# Patient Record
Sex: FEMALE | Race: WHITE
[De-identification: names, ages, dates, MRNs, and addresses within clinical notes are randomized per-mention and may not be internally consistent; named-entity substitution may affect disease eponyms.]

---

## 2019-01-08 ENCOUNTER — HOSPITAL ENCOUNTER (INPATIENT)
Dept: HOSPITAL 75 - 4TH | Age: 34
LOS: 4 days | Discharge: HOME | End: 2019-01-12
Payer: SELF-PAY

## 2019-02-18 ENCOUNTER — HOSPITAL ENCOUNTER (OUTPATIENT)
Dept: HOSPITAL 75 - SLEEP | Age: 34
LOS: 1 days | Discharge: HOME | End: 2019-02-19
Attending: NURSE PRACTITIONER
Payer: COMMERCIAL

## 2019-02-18 DIAGNOSIS — G47.10: Primary | ICD-10-CM

## 2019-02-18 DIAGNOSIS — Z72.0: ICD-10-CM

## 2019-02-18 DIAGNOSIS — I50.9: ICD-10-CM

## 2019-02-18 DIAGNOSIS — R94.30: ICD-10-CM

## 2019-02-18 PROCEDURE — 95810 POLYSOM 6/> YRS 4/> PARAM: CPT

## 2019-04-23 ENCOUNTER — HOSPITAL ENCOUNTER (OUTPATIENT)
Dept: HOSPITAL 75 - CARD | Age: 34
End: 2019-04-23
Attending: INTERNAL MEDICINE
Payer: COMMERCIAL

## 2019-04-23 DIAGNOSIS — I08.1: ICD-10-CM

## 2019-04-23 DIAGNOSIS — I42.8: Primary | ICD-10-CM

## 2019-04-23 PROCEDURE — 93306 TTE W/DOPPLER COMPLETE: CPT

## 2020-02-16 ENCOUNTER — HOSPITAL ENCOUNTER (EMERGENCY)
Dept: HOSPITAL 75 - ER | Age: 35
Discharge: HOME | End: 2020-02-16
Payer: SELF-PAY

## 2020-02-16 VITALS — WEIGHT: 293 LBS | BODY MASS INDEX: 48.82 KG/M2 | HEIGHT: 64.96 IN

## 2020-02-16 VITALS — SYSTOLIC BLOOD PRESSURE: 136 MMHG | DIASTOLIC BLOOD PRESSURE: 87 MMHG

## 2020-02-16 DIAGNOSIS — K02.9: Primary | ICD-10-CM

## 2020-02-16 DIAGNOSIS — Z88.8: ICD-10-CM

## 2020-02-16 DIAGNOSIS — Z88.4: ICD-10-CM

## 2020-02-16 DIAGNOSIS — K04.7: ICD-10-CM

## 2020-02-16 PROCEDURE — 99282 EMERGENCY DEPT VISIT SF MDM: CPT

## 2020-02-16 NOTE — ED EENT
History of Present Illness


General


Chief Complaint:  Dental Problems/Pain


Stated Complaint:  DENTAL PAIN


Nursing Triage Note:  


Patient ambulatory to ER FT3 with complaint of dental pain x 3 days. Patient 


states she broke an upper left tooth 3 days ago and now has swelling and pain to




the left upper jaw. Patient has been taking orajel and ibuprofen with minimal 


relief.


Source:  patient


Exam Limitations:  no limitations





History of Present Illness


Date Seen by Provider:  Feb 16, 2020


Time Seen by Provider:  13:46


Initial Comments


34-year-old female patient presents with complaints of left upper dental pain 3

days.  Patient reports chipping the tooth 3 days ago.  She denies improvement 

with Orajel and ibuprofen at home.


Timing/Duration:  gradual


Location:  facial, dental


Prearrival Treatment:  over the counter meds


Modifying Factors:  Improves With Other (worse with palpation and chewing.)





Allergies and Home Medications


Allergies


Coded Allergies:  


     paroxetine (Unverified  Allergy, Unknown, 5/5/14)


Uncoded Allergies:  


     ANESTHETIC (Allergy, Unknown, 5/5/14)





Home Medications


Cefdinir 300 Mg Capsule, 300 MG PO BID


   Prescribed by: TIFFANY GILBERT on 1/10/19 1654


Metoprolol Succinate 25 Mg Tab.er.24h, 25 MG PO DAILY


   Prescribed by: TIFFANY GILBERT on 1/10/19 1654


Penicillin V Potassium 500 Mg Tablet, 500 MG PO QID


   Prescribed by: FELICITY TERESA on 2/16/20 1412


Sacubitril/Valsartan 1 Each Tablet, 1 TAB PO BID


   Prescribed by: TIFFANY GILBERT on 1/10/19 1654


Tramadol HCl 50 Mg Tablet, 50 MG PO Q6H PRN for PAIN


   Prescribed by: FELICITY TERESA on 2/16/20 1412





Patient Home Medication List


Home Medication List Reviewed:  Yes





Review of Systems


Review of Systems


Constitutional:  No chills, No diaphoresis, No fever, No malaise


Eyes:  No Symptoms Reported


Ears:  No Symptoms Reported


Nose:  no symptoms reported


Mouth:  see HPI, pain, swelling (left upper); denies bloody discharge, denies 

clear discharge, denies purulent discharge, denies serosanguinous discharge


Throat:  denies pain, denies swelling, denies neck stiffness, denies hoarse, 

denies aphonia, denies muffled, denies painful swallowing, denies difficulty 

with fluids


Respiratory:  no symptoms reported


Cardiovascular:  no symptoms reported


Gastrointestinal:  no symptoms reported


Skin:  no symptoms reported


Neurological:  No Symptoms Reported





All Other Systems Reviewed


Negative Unless Noted:  Yes (Negative excepted noted.)





Past Medical-Social-Family Hx


Past Med/Social Hx:  Reviewed Nursing Past Med/Soc Hx


Patient Social History


Type Used:  Cigarettes


2nd Hand Smoke Exposure:  Yes


Recent Foreign Travel:  No


Contact w/Someone Who Travel:  No


Recent Infectious Disease Expo:  No


Recent Hopitalizations:  No





Immunizations Up To Date


Date of Pneumonia Vaccine:  Oct 1, 2011


Date of Influenza Vaccine:  Jan 9, 2019





Seasonal Allergies


Seasonal Allergies:  No





Past Medical History


Surgeries:  Yes


Adenoidectomy, Tonsillectomy, Tubal Ligation


Respiratory:  Yes (VERY DIFFICULT TO INTUBATE)


Asthma


Cardiac:  Yes


Neurological:  No


Reproductive Disorders:  Yes


GYN History:  Tubal Ligation


Genitourinary:  No


Gastrointestinal:  No


Musculoskeletal:  No


Endocrine:  No


HEENT:  No


Cancer:  No


Psychosocial:  No


Integumentary:  No


Blood Disorders:  No





Family Medical History


Reviewed Nursing Family Hx





Hypertension


  19 FATHER


  19 MOTHER


Myocardial infarction


  Maternal Grandmother


  Maternal Grandfather


Heart Disease, Other Conditions/Hx





Physical Exam


Vital Signs





Vital Signs - First Documented








 2/16/20





 13:26


 


Temp 37.1


 


Pulse 76


 


Resp 18


 


B/P (MAP) 136/87 (103)


 


Pulse Ox 100


 


O2 Delivery Room Air








Height, Weight, BMI


Height: 5'5.00"


Weight: 251lbs. 6.0oz. [id number]; 49.00 BMI


Method:Estimated


General Appearance:  WD/WN, no apparent distress, obese


Eyes:  bilateral eye normal inspection, bilateral eye PERRL, bilateral eye EOMI


Ears:  bilateral ear auricle normal, bilateral ear canal normal, bilateral ear 

TM normal


Nose:  normal inspection


Mouth/Throat:  pharynx normal, dental tenderness (left upper dental tenderness);

No excessive drooling; maxillary swelling (left); No pharynx swelling, No 

trismus, No uvula swelling, No voice changes


Neck:  non-tender, full range of motion, supple, normal inspection


Cardiovascular:  normal peripheral pulses, regular rate, rhythm, no edema, no 

murmur


Respiratory:  lungs clear, normal breath sounds, no respiratory distress, no 

accessory muscle use


Neurologic/Psychiatric:  alert, normal mood/affect, oriented x 3


Skin:  normal color, warm/dry





Progress/Results/Core Measures


Results/Orders


My Orders





Orders - FELICITY TEREAS


Lidocaine 2% Viscous 15 Ml (Xylocaine Vi (2/16/20 14:15)


Benzocaine Extension Tube (Hurricaine Ex (2/16/20 14:15)





Vital Signs/I&O











 2/16/20





 13:26


 


Temp 37.1


 


Pulse 76


 


Resp 18


 


B/P (MAP) 136/87 (103)


 


Pulse Ox 100


 


O2 Delivery Room Air














Blood Pressure Mean:                    103











Departure


Impression





   Primary Impression:  


   Infected dental caries


Disposition:  01 HOME, SELF-CARE


Condition:  Improved





Departure-Patient Inst.


Decision time for Depature:  14:10


Referrals:  


[facility] (PCP/Family)


Primary Care Physician


Patient Instructions:  Dental Pain, Tooth Abscess (DC)





Add. Discharge Instructions:  


All discharge instructions reviewed with patient and/or family. Voiced 

understanding.  Medications as instructed.  Tylenol Extra Strength 

over-the-counter as directed for pain.  Ibuprofen 800 mg by mouth every 8 hours 

as needed for pain.  Soft diet as tolerated.  Stay well hydrated.  Ice packs or 

warm packs as needed for pain.  Follow-up with the dentist of your choice for 

recheck and dental repair as an outpatient.  Call their office Monday morning 

for appointment time.  Return to the emergency department for worsened pain, 

fever, difficulty swallowing, difficulty breathing, or any other concerns.





Lidocaine with benzocaine gauze pads- Place 1 pad between the gums and cheek of 

the affected tooth for 5-10 minutes.  Then remove the pad.  You may repeat this 

4 times a day as needed for pain.  DO NOT lie down with the pad in her mouth, 

swallow the pad, or fall asleep with the pad in your mouth due to risk of 

choking, bowel obstruction, and death.


Scripts


Tramadol HCl (Tramadol HCl) 50 Mg Tablet


50 MG PO Q6H PRN for PAIN, #14 TAB 0 Refills


   Prov: FELICITY TERESA         2/16/20 


Penicillin V Potassium (Penicillin V Potassium) 500 Mg Tablet


500 MG PO QID, #42 TAB 0 Refills


   Prov: FELICITY TERESA         2/16/20











FELICITY TERESA           Feb 16, 2020 13:46

## 2020-08-18 ENCOUNTER — HOSPITAL ENCOUNTER (EMERGENCY)
Dept: HOSPITAL 75 - ER | Age: 35
Discharge: HOME | End: 2020-08-18
Payer: SELF-PAY

## 2020-08-18 VITALS — HEIGHT: 65 IN | WEIGHT: 274.92 LBS | BODY MASS INDEX: 45.8 KG/M2

## 2020-08-18 VITALS — SYSTOLIC BLOOD PRESSURE: 127 MMHG | DIASTOLIC BLOOD PRESSURE: 88 MMHG

## 2020-08-18 DIAGNOSIS — F17.210: ICD-10-CM

## 2020-08-18 DIAGNOSIS — Z82.49: ICD-10-CM

## 2020-08-18 DIAGNOSIS — K04.7: Primary | ICD-10-CM

## 2020-08-18 DIAGNOSIS — Z88.8: ICD-10-CM

## 2020-08-18 DIAGNOSIS — Z88.4: ICD-10-CM

## 2020-08-18 PROCEDURE — 99283 EMERGENCY DEPT VISIT LOW MDM: CPT

## 2020-08-18 NOTE — XMS REPORT
[facility]

                             Created on: 2020



Love Chery

External Reference #: [id number]

: 1985

Sex: Female



Demographics





                          Address                   [address] [place]  [address]

 

                          Preferred Language        Unknown

 

                          Marital Status            Unknown

 

                          Mosque Affiliation     Unknown

 

                          Race                      Unknown

 

                          Ethnic Group              Unknown





Author





                          Author                    Love SHETH

 

                          [facility]

 

                          Address                   [address] [place]  [address]



 

                          Phone                     [phone]







Care Team Providers





                    Care Team Member Name Role                Phone

 

                    MERYL  JACKIE        Unavailable         [phone]







PROBLEMS





          Type      Condition ICD9-CM Code SLS40-YD Code Onset Dates Condition S

tatus SNOMED 

Code

 

          Problem   Seasonal allergic rhinitis due to pollen           J30.1    

           Active    26941677

 

                          Problem                   Heart failure, unspecified H

F chronicity, unspecified heart failure type

                          I50.9                     Active       43109021

 

          Problem   Asthma              J45.909             Active    [id number]







ALLERGIES

No Information



ENCOUNTERS





                Encounter       Location        Date            Diagnosis

 

                          [facility]  [id number]

[address] [place] [address] 

[address]                              Acute left-sided low back pa

in without sciatica M54.5

 

                          [facility]  [address] [facility] [id number]

[address] [place] [address] 

[address]                05 Mar, 2020              Sore throat J02.9

 

                          [facility]     [address] [facility] [id number]

[address] [place] [address]

                          09 Sep, 2019               

 

                          [facility]     [address] [id number]

[address] [place] [address]

                                         

 

                          [facility]     [address] [facility] [id number]

[address] [place] [address]

                          17 May, 2019               

 

                          [facility]     [address] [id number]

[address] [place] [address]

                          14 May, 2019               

 

                          [facility]     [address] [id number]

[address] [place] [address]

                          28 Mar, 2019               

 

                          [facility]     [address] [id number]

[address] [place] [address]

                          06 Mar, 2019              Morbid obesity E66.01 and Lo

calized edema R60.0

 

                          [facility]     [address] [id number]

[address] [place] [address]

                          14 2019               

 

                          [facility]     [address] [address]

                          10 Charles, 2019               

 

                          [facility]     [address] [address]

                          10 Charles, 2019              Heart failure, unspecified H

F chronicity, unspecified heart failure

type I50.9

 

                          [facility]  [address] 

[address]                              Seasonal allergic rhinitis d

ue to pollen J30.1 and 

Asthma J45.909

 

                          [facility]  [address] 

[address]                              Seasonal allergic rhinitis d

ue to pollen J30.1

 

                          [facility] [place] [address] 

[address]                19 Oct, 2016              Acute upper respiratory infe

ction, unspecified J06.9

 

                          [place] [address] [address]

                          05 Oct, 2016               

 

                          [facility]  [address] 

[address]                13 May, 2016              Environmental allergies Z91.

09

 

                          [place] [address] [address]

                          24 Aug, 2015              Abscess 682.9

 

                          [address] [place] [address] [address]

                          17 Aug, 2015              Mood disorder 296.90

 

                          [place] [address] [address]

                                        Screen for STD (sexually tra

nsmitted disease) V74.5

 

                          [place] [id number]

[address] [place] [address]

                          15 Jul, 2015               

 

                          [place] [address] [address]

                          13 2015              Depression 311

 

                          [facility]     [address] [id number]

[address] [place] [address]

                          13 2015              Major depressive disorder, r

ecurrent episode, severe 296.33 and No 

condition on Axis II V71.09

 

                          [facility]     [address] [id number]

[address] [place] [address]

                          10 2015               

 

                          [facility]     [address] [id number]

[address] [place] [address]

                          14 2015               

 

                          [facility]     [address] [id number]

[address] [place] [address]

                                         

 

                          [facility]     [address] [id number]

[address] [place] [address]

                          16 Sep, 2014               

 

                          [facility]     [address] [id number]

[address] [place] [address]

                          16 Sep, 2014               

 

                          [facility]     [address] [id number]

[address] [place] [address]

                          15 Sep, 2014               

 

                          [facility]     [address] [id number]

[address] [place] [address]

                          15 Sep, 2014               

 

                          [facility]     [address] [id number]

[address] [place] [address]

                          12 Sep, 2014               

 

                          [facility]     [address] [id number]

[address] [place] [address]

                          12 Sep, 2014               

 

                          [facility]     [address] [id number]

[address] [place] [address]

                          06 Aug, 2014               

 

                          [facility]     [address] [id number]

[address] [place] [address]

                          06 Aug, 2014               

 

                          [facility]     [address] [id number]

[address] [place] [address]

                                         

 

                          [facility]     [address] [id number]

[address] [place] [address]

                                         

 

                          [facility]     [address] [id number]

[address] [place] [address], [place] [address]

                          10 Adam, 2014               

 

                          [facility]     [address] [id number]

[address] [place] [address], [place] [address]

                          10 Adam, 2014               

 

                          [facility]     [address] [id number]

[address] [place] [address], [place] [address]

                          07 May, 2014               

 

                          [facility]     [address] [id number]

[address] [place] [address], [place] [address]

                          07 May, 2014               

 

                          [facility]     [address] [id number]

[address] [place] [address], [place] [address]

                          05 May, 2014               

 

                          [facility]     [address] [id number]

[address] [place] [address], [place] [address]

                          05 May, 2014               

 

                          [facility]     [address] [id number]

[address] [place] [address], [place] [address]

                                         

 

                          [facility]     [address] [id number]

[address] [place] [address], [place] [address]

                                         

 

                          [facility]     [address] [id number]

[address] [place] [address], [place] [address]

                          25 Mar, 2014               

 

                          [facility]     [address] [id number]

[address] [place] [address], [place] [address]

                          24 Mar, 2014               

 

                          [facility]     [address] [id number]

[address] [place] [address], [place] [address]

                          24 Mar, 2014               

 

                          [facility]     [address] [id number]

[address] [place] [address], [place] [address]

                          13 Mar, 2014               

 

                          [facility]     [address] [id number]

[address] [place] [address], [place] [address]

                          13 Mar, 2014               

 

                          [facility]     [address] [id number]

[address] [place] [address], [place] [address]

                                         

 

                          [facility]     [address] [id number]

[address] [place] [address], [place] [address]

                                         

 

                          [facility]     [address] [id number]

[address] [place] [address], [place] [address]

                                         

 

                          [facility]     [address] [id number]

[address] [place] [address], [place] [address]

                                         

 

                          [facility]     [address] [id number]

[address] [place] [address], [place] [address]

                                         

 

                          [facility]     [address] [id number]

[address] [place] [address], [place] [address]

                                         

 

                          [facility]     [address] [id number]

[address] [place] [address], [place] [address]

                                         

 

                          [facility]     [address] [id number]

[address] [place] [address], [place] [address]

                          19 Dec, 2013               

 

                          [facility]     [address] [id number]

[address] [place] [address], [place] [address]

                          19 Dec, 2013               

 

                          [facility]     [address] [id number]

[address] [place] [address], [place] [address]

                                         

 

                          [facility]     [address] [id number]

[address] [place] [address], [place] [address]

                                         

 

                          [facility]     [address] [id number]

[address] [place] [address], [place] [address]

                                         

 

                          [facility]     [address] [id number]

[address] [place] [address], [place] [address]

                                         

 

                          [facility]     [address] [id number]

[address] [place] [address], [place] [address]

                          27 Sep, 2013               

 

                          [facility]     [address] [id number]

[address] [place] [address], [place] [address]

                          05 Sep, 2013               

 

                          [facility]     [address] [id number]

[address] [place] [address], [place] [address]

                                         

 

                          [facility]     [address] [id number]

[address] [place] [address], [place] [address]

                                         

 

                          [facility]     [address] [id number]

[address] [place] [address], [place] [address]

                                         

 

                          [facility]     [address] [id number]

[address] [place] [address], [place] [address]

                                         

 

                          [facility]     [address] [id number]

[address] [address]

                          31 May, 2013               

 

                          [facility]     [address] [id number]

[address] [place] [address], [place] [address]

                          06 May, 2013               

 

                          [facility]     [address] [id number]

[address] [place] [address], [place] [address]

                                         

 

                          [facility]     [address] [id number]

[address] [place] [address], [place] [address]

                          28 Mar, 2013               

 

                          [facility]     [address] [id number]

[address] [place] [address], [place] [address]

                          18 Mar, 2013               

 

                          [facility]     [address] [id number]

[address] [place] [address], [place] [address]

                          14 Mar, 2013               

 

                          [facility]     [address] [id number]

[address] [place] [address], [place] [address]

                          13 Mar, 2013               

 

                          [facility]     [address] [id number]

[address] [place] [address], [place] [address]

                          12 Mar, 2013               

 

                          [facility]     [address] [id number]

[address] [place] [address], [place] [address]

                          11 Mar, 2013               

 

                          [facility]     [address] [id number]

[address] [place] [address], [place] [address]

                          06 Mar, 2013               

 

                          [facility]     [address] [id number]

[address] [place] [address], [place] [address]

                          06 Mar, 2013               

 

                          [facility]     [address] [id number]

[address] [place] [address], [place] [address]

                          06 Mar, 2013               

 

                          [facility]     [address] [id number]

[address] [place] [address], [place] [address]

                          06 Mar, 2013               

 

                          [facility]     [address] [id number]

[address] [place] [address], [place] [address]

                          05 Mar, 2013               

 

                          [facility]     [address] [id number]

[address] [place] [address], [place] [address]

                          06 2013               

 

                          [facility]     [address] [id number]

[address] [place] [address], [place] [address]

                                         

 

                          [facility]     [address] [id number]

[address] [place] [address], [place] [address]

                                         

 

                          [facility]     [address] [id number]

[address] [place] [address], [place] [address]

                                         

 

                          [facility]     [address] [id number]

[address] [place] [address], [place] [address]

                                         

 

                    [facility]     [address] [id number] [place], 

[place] [id number] 20 Aug, 2012

                                         

 

                          [facility]     [address] [id number]

[address] [place] [address], [place] [address]

                                         

 

                          [facility]     [address] [id number]

[address] [place] [address], [place] [address]

                                         

 

                          [facility]     [address] [id number]

[address] [place] [address], [place] [address]

                                         

 

                          [facility]     [address] [id number]

[address] [place] [address], [place] [address]

                                         

 

                          [facility]     [address] [id number]

[address] [place] [address], [place] [address]

                                         

 

                          [facility]     [address] [id number]

[address] [place] [address], [place] [address]

                                         

 

                          [facility]     [address] [id number]

[address] [place] [address], [place] [address]

                                         

 

                          [facility]     [address] [id number]

[address] [place] [address], [place] [address]

                                         

 

                          [facility]     [address] [id number]

[address] [place] [address], [place] [address]

                          26 Mar, 2012               

 

                          [facility]     [address] [id number]

[address] [place] [address], [place] [address]

                                         

 

                          [facility]     [address] [id number]

[address] [place] [address], [place] [address]

                                         

 

                          [facility]     [address] [id number]

[address] [place] [address], [place] [address]

                          12 Dec, 2011               

 

                          [facility]     [address] [id number]

[address] [place] [address], [place] [address]

                                         

 

                          [facility]     [address] [id number]

[address] [place] [address], [place] [address]

                                         

 

                          [facility]     [address] [id number]

[address] [place] [address], [place] [address]

                                         

 

                          [facility]     [address] [id number]

[address] [place] [address], [place] [address]

                                         

 

                          [facility]     [address] [id number]

[address] [place] [address], [place] [address]

                          31 Oct, 2011               

 

                          [facility]     [address] [id number]

[address] [place] [address], [place] [address]

                          17 Oct, 2011               

 

                          [facility]     [address] [id number]

[address] [place] [address], [place] [address]

                          17 Oct, 2011               

 

                          [facility]     [address] [id number]

[address] [place] [address], [place] [address]

                          10 Oct, 2011               

 

                          [facility]     [address] [id number]

[address] [place] [address], [place] [address]

                          22 Dec, 2010               

 

                          [facility]     [address] [id number]

[address] [place] [address], [place] [address]

                                         

 

                          [facility]     [address] [id number]

[address] [place] [address], [place] [address]

                                         

 

                          [facility]     [address] [id number]

[address] [place] [address], [place] [address]

                          19 May, 2010               

 

                          [facility]     [address] [id number]

[address] [place] [address], [place] [address]

                          17 May, 2010               

 

                          [facility]     [address] [id number]

[address] [place] [address], [place] [address]

                          13 May, 2010               

 

                          [facility]     [address] [id number]

[address] [place] [phone]

                          11 May, 2010               

 

                          [facility]     [address] [facility] [id number]

[address] [place] [phone]

                          10 May, 2010               

 

                          [facility]     [address] [facility] [id number]

[address] [place] [phone]

                          11 Dec, 2009               

 

                          [facility]     [address] [facility] [id number]

[address] [place] [phone]

                          11 Dec, 2009               

 

                          [facility]     [address] [facility] [id number]

[address] [place] [phone]

                                         

 

                          [facility]     [address] [facility] [id number]

[address] [place] [phone]

                                         







IMMUNIZATIONS

No Known Immunizations



SOCIAL HISTORY

Never Assessed



REASON FOR VISIT





PLAN OF CARE





VITAL SIGNS





MEDICATIONS

No Known Medications



RESULTS

No Results



PROCEDURES

No Known procedures



INSTRUCTIONS





MEDICATIONS ADMINISTERED

No Known Medications



MEDICAL (GENERAL) HISTORY





                    Type                Description         Date

 

                    Medical History     asthma-dx at age 11-12  

 

                          Medical History           hypertension-hx of pre-eclam

psia with second pregnancy, was 

induced at 35 weeks                      

 

                    Medical History     cardiomyopathy-postpartum (non-ischemic)

 (Stephanie)  

 

                    Medical History     depression           

 

                    Medical History     [facility]                  

 

                    Surgical History    tonsillectomy        

 

                    Surgical History    tubal               2014

 

                    Surgical History    tonsillectomy        

 

                          Surgical History          tubal afivkubd-Rrceyp-is dev

eloped respiratory issues and heart

failure following the surgery           2011

 

                    Surgical History    echo-2010, 8/19/10, , 11  

 

                          Hospitalization History   PPD #4 for non-ischemic card

iomyopathy, respiratory 

distress due to pulmonary edema, ARF    2010

 

                    Hospitalization History surgeries

## 2020-08-18 NOTE — XMS REPORT
[facility]

                             Created on: 2020



Love Chery

External Reference #: [id number]

: 1985

Sex: Female



Demographics





                          Address                   [address] [place]  [address]

 

                          Preferred Language        Unknown

 

                          Marital Status            Unknown

 

                          Buddhism Affiliation     Unknown

 

                          Race                      Unknown

 

                          Ethnic Group              Unknown





Author





                          Author                    Love SHETH

 

                          [facility] [address]

 

                          Address                   [address] [place]  [address]



 

                          Phone                     [phone]







Care Team Providers





                    Care Team Member Name Role                Phone

 

                    MERYL  JACKIE        Unavailable         [phone]







PROBLEMS





          Type      Condition ICD9-CM Code AYJ88-UM Code Onset Dates Condition S

tatus SNOMED 

Code

 

          Problem   Seasonal allergic rhinitis due to pollen           J30.1    

           Active    84460179

 

                          Problem                   Heart failure, unspecified H

F chronicity, unspecified heart failure type

                          I50.9                     Active       86492984

 

          Problem   Asthma              J45.909             Active    920930786







ALLERGIES

No Information



ENCOUNTERS





                Encounter       Location        Date            Diagnosis

 

                          [facility]  [address] [id number]

[address] [place] [address] 

[address]                              Acute left-sided low back pa

in without sciatica M54.5

 

                          [facility]  [address] [facility] [id number]

[address] [place] [address] 

[address]                05 Mar, 2020              Sore throat J02.9

 

                          [facility]     [address] [facility] [id number]

[address] [place] [address]

                          09 Sep, 2019               

 

                          [facility]     [address] [id number]

[address] [place] [address]

                                         

 

                          [facility]     [address] [facility] [id number]

[address] [place] [address]

                          17 May, 2019               

 

                          [facility]     [address] [id number]

[address] [place] [address]

                          14 May, 2019               

 

                          [facility]     [address] [id number]

[address] [place] [address]

                          28 Mar, 2019               

 

                          [facility]     [address] [id number]

[address] [place] [address]

                          06 Mar, 2019              Morbid obesity E66.01 and Lo

calized edema R60.0

 

                          [facility]     [address] [id number]

[address] [place] [address]

                          14 2019               

 

                          [facility]     [address] [address]

                          10 Charles, 2019               

 

                          [facility]     [address] [address]

                          10 Charles, 2019              Heart failure, unspecified H

F chronicity, unspecified heart failure

type I50.9

 

                          [facility]  [address] 

[address]                              Seasonal allergic rhinitis d

ue to pollen J30.1 and 

Asthma J45.909

 

                          [facility]  [address] 

[address]                              Seasonal allergic rhinitis d

ue to pollen J30.1

 

                          [facility] WALK IN [address] [address] 

[address]                19 Oct, 2016              Acute upper respiratory infe

ction, unspecified J06.9

 

                          [place] [address] [address]

                          05 Oct, 2016               

 

                          [facility]  [address] 

[address]                13 May, 2016              Environmental allergies Z91.

09

 

                          [place] [address] [address]

                          24 Aug, 2015              Abscess 682.9

 

                          [address] [address] [address]

                          17 Aug, 2015              Mood disorder 296.90

 

                          [address] [place] [address] [address]

                                        Screen for STD (sexually tra

nsmitted disease) V74.5

 

                          [place] [address] [id number]

[address] [place] [address]

                          15 Jul, 2015               

 

                          [address] [place] [address] [address]

                          13 2015              Depression 311

 

                          [facility]     [address] [id number]

[address] [place] [address]

                          13 2015              Major depressive disorder, r

ecurrent episode, severe 296.33 and No 

condition on Axis II V71.09

 

                          [facility]     [address] [id number]

[address] [place] [address]

                          10 2015               

 

                          [facility]     [address] [id number]

[address] [place] [address]

                          14 2015               

 

                          [facility]     [address] [id number]

[address] [place] [address]

                                         

 

                          [facility]     [address] [id number]

[address] [place] [address]

                          16 Sep, 2014               

 

                          [facility]     [address] [id number]

[address] [place] [address]

                          16 Sep, 2014               

 

                          [facility]     [address] [id number]

[address] [place] [address]

                          15 Sep, 2014               

 

                          [facility]     [address] [id number]

[address] [place] [address]

                          15 Sep, 2014               

 

                          [facility]     [address] [id number]

[address] [place] [address]

                          12 Sep, 2014               

 

                          [facility]     [address] [id number]

[address] [place] [address]

                          12 Sep, 2014               

 

                          [facility]     [address] [id number]

[address] [place] [address]

                          06 Aug, 2014               

 

                          [facility]     [address] [id number]

[address] [place] [address]

                          06 Aug, 2014               

 

                          [facility]     [address] [id number]

[address] [place] [address]

                                         

 

                          [facility]     [address] [id number]

[address] [place] [address]

                                         

 

                          [facility]     [address] [id number]

[address] [place] [address], [place] [address]

                          10 Adam, 2014               

 

                          [facility]     [address] [id number]

[address] [place] [address], [place] [address]

                          10 Adam, 2014               

 

                          [facility]     [address] [id number]

[address] [place] [address], [place] [address]

                          07 May, 2014               

 

                          [facility]     [address] [id number]

[address] [place] [address], [place] [address]

                          07 May, 2014               

 

                          [facility]     [address] [id number]

[address] [place] [address], [place] [address]

                          05 May, 2014               

 

                          [facility]     [address] [id number]

[address] [place] [address], [place] [address]

                          05 May, 2014               

 

                          [facility]     [address] [id number]

[address] [place] [address], [place] [address]

                                         

 

                          [facility]     [address] [id number]

[address] [place] [address], [place] [address]

                                         

 

                          [facility]     [address] [id number]

[address] [place] [address], [place] [address]

                          25 Mar, 2014               

 

                          [facility]     [address] [id number]

[address] [place] [address], [place] [address]

                          24 Mar, 2014               

 

                          [facility]     [address] [id number]

[address] [place] [address], [place] [address]

                          24 Mar, 2014               

 

                          [facility]     [address] [id number]

[address] [place] [address], [place] [address]

                          13 Mar, 2014               

 

                          [facility]     [address] [id number]

[address] [place] [address], [place] [address]

                          13 Mar, 2014               

 

                          [facility]     [address] [id number]

[address] [place] [address], [place] [address]

                                         

 

                          [facility]     [address] [id number]

[address] [place] [address], [place] [address]

                                         

 

                          [facility]     [address] [id number]

[address] [place] [address], [place] [address]

                                         

 

                          [facility]     [address] [id number]

[address] [place] [address], [place] [address]

                                         

 

                          [facility]     [address] [id number]

[address] [place] [address], [place] [address]

                                         

 

                          [facility]     [address] [id number]

[address] [place] [address], [place] [address]

                                         

 

                          [facility]     [address] [id number]

[address] [place] [address], [place] [address]

                                         

 

                          [facility]     [address] [id number]

[address] [place] [address], [place] [address]

                          19 Dec, 2013               

 

                          [facility]     [address] [id number]

[address] [place] [address], [place] [address]

                          19 Dec, 2013               

 

                          [facility]     [address] [id number]

[address] [place] [address], [place] [address]

                                         

 

                          [facility]     [address] [id number]

[address] [place] [address], [place] [address]

                                         

 

                          [facility]     [address] [id number]

[address] [place] [address], [place] [address]

                                         

 

                          [facility]     [address] [id number]

[address] [place] [address], [place] [address]

                                         

 

                          [facility]     [address] [id number]

[address] [place] [address], [place] [address]

                          27 Sep, 2013               

 

                          [facility]     [address] [id number]

[address] [place] [address], [place] [address]

                          05 Sep, 2013               

 

                          [facility]     [address] [id number]

[address] [place] [address], [place] [address]

                                         

 

                          [facility]     [address] [id number]

[address] [place] [address], [place] [address]

                                         

 

                          [facility]     [address] [id number]

[address] [place] [address], [place] [address]

                                         

 

                          [facility]     [address] [id number]

[address] [place] [address], [place] [address]

                                         

 

                          [facility]     [address] [id number]

[facility] [address]

                          31 May, 2013               

 

                          [facility]     [address] [id number]

[address] [place] [address], [place] [address]

                          06 May, 2013               

 

                          [facility]     [address] [id number]

[address] [place] [address], [place] [address]

                                         

 

                          [facility]     [address] [id number]

[address] [place] [address], [place] [address]

                          28 Mar, 2013               

 

                          [facility]     [address] [id number]

[address] [place] [address], [place] [address]

                          18 Mar, 2013               

 

                          [facility]     [address] [id number]

[address] [place] [address], [place] [address]

                          14 Mar, 2013               

 

                          [facility]     [address] [id number]

[address] [place] [address], [place] [address]

                          13 Mar, 2013               

 

                          [facility]     [address] [id number]

[address] [place] [address], [place] [address]

                          12 Mar, 2013               

 

                          [facility]     [address] [id number]

[address] [place] [address], [place] [address]

                          11 Mar, 2013               

 

                          [facility]     [address] [id number]

[address] [place] [address], [place] [address]

                          06 Mar, 2013               

 

                          [facility]     [address] [id number]

[address] [place] [address], [place] [address]

                          06 Mar, 2013               

 

                          [facility]     [address] [id number]

[address] [place] [address], [place] [address]

                          06 Mar, 2013               

 

                          [facility]     [address] [id number]

[address] [place] [address], [place] [address]

                          06 Mar, 2013               

 

                          [facility]     [address] [id number]

[address] [place] [address], [place] [address]

                          05 Mar, 2013               

 

                          [facility]     [address] [id number]

[address] [place] [address], [place] [address]

                          06 2013               

 

                          [facility]     [address] [id number]

[address] [place] [address], [place] [address]

                                         

 

                          [facility]     [address] [id number]

[address] [place] [address], [place] [address]

                                         

 

                          [facility]     [address] [id number]

[address] [place] [address], [place] [address]

                                         

 

                          [facility]     [address] [id number]

[address] [place] [address], [place] [address]

                                         

 

                    [facility]     [address] [id number] [place], 

[place] [id number] 20 Aug, 2012

                                         

 

                          [facility]     [address] [id number]

[address] [place] [address], [place] [address]

                                         

 

                          [facility]     [address] [id number]

[address] [place] [address], [place] [address]

                                         

 

                          [facility]     [address] [id number]

[address] [place] [address], [place] [address]

                                         

 

                          [facility]     [address] [id number]

[address] [place] [address], [place] [address]

                                         

 

                          [facility]     [address] [id number]

[address] [place] [address], [place] [address]

                                         

 

                          [facility]     [address] [id number]

[address] [place] [address], [place] [address]

                                         

 

                          [facility]     [address] [id number]

[address] [place] [address], [place] [address]

                                         

 

                          [facility]     [address] [id number]

[address] [place] [address], [place] [address]

                                         

 

                          [facility]     [address] [id number]

[address] [place] [address], [place] [address]

                          26 Mar, 2012               

 

                          [facility]     [address] [id number]

[address] [place] [address], [place] [address]

                                         

 

                          [facility]     [address] [id number]

[address] [place] [address], [place] [address]

                                         

 

                          [facility]     [address] [id number]

[address] [place] [address], [place] [address]

                          12 Dec, 2011               

 

                          [facility]     [address] [id number]

[address] [place] [address], [place] [address]

                                         

 

                          [facility]     [address] [id number]

[address] [place] [address], [place] [address]

                                         

 

                          [facility]     [address] [id number]

[address] [place] [address], [place] [address]

                                         

 

                          [facility]     [address] [id number]

[address] [place] [address], [place] [address]

                                         

 

                          [facility]     [address] [id number]

[address] [place] [address], [place] [address]

                          31 Oct, 2011               

 

                          [facility]     [address] [id number]

[address] [place] [address], [place] [address]

                          17 Oct, 2011               

 

                          [facility]     [address] [id number]

[address] [place] [address], [place] [address]

                          17 Oct, 2011               

 

                          [facility]     [address] [id number]

[address] [place] [address], [place] [address]

                          10 Oct, 2011               

 

                          [facility]     [address] [id number]

[address] [place] [address], [place] [address]

                          22 Dec, 2010               

 

                          [facility]     [address] [id number]

[address] [place] [address], [place] [address]

                                         

 

                          [facility]     [address] [id number]

[address] [place] [address], [place] [address]

                                         

 

                          [facility]     [address] [id number]

[address] [place] [address], [place] [address]

                          19 May, 2010               

 

                          [facility]     [address] [id number]

[address] [place] [address], [place] [address]

                          17 May, 2010               

 

                          [facility]     [address] [id number]

[address] [place] [address], [place] [address]

                          13 May, 2010               

 

                          [facility]     [address] [id number]

[address] [place] [address]

                          11 May, 2010               

 

                          [facility]     [address] [facility] [id number]

[address] [place] [address]

                          10 May, 2010               

 

                          [facility]     [address] [facility] [id number]

[address] [place] [address]

                          [address] Dec, 2009               

 

                          [facility]     [address] [facility] [id number]

[address] [place] [id number]

                          11 Dec, 2009               

 

                          [facility]     [address] [facility] [id number]

[address] [place] [id number]

                                         

 

                          [facility]     [address] [facility] [id number]

[address] [place] [id number]

                                         







IMMUNIZATIONS

No Known Immunizations



SOCIAL HISTORY

Never Assessed



REASON FOR VISIT





PLAN OF CARE





VITAL SIGNS





                    Height              65.5 in             2012

 

                    Weight              271 lbs             2012

 

                    Temperature         96.6 degrees Fahrenheit 2012

 

                    Heart Rate          80 bpm              2012

 

                    Respiratory Rate    18                  2012

 

                    Blood pressure systolic 128 mmHg            2012

 

                    Blood pressure diastolic 100 mmHg            2012







MEDICATIONS

No Known Medications



RESULTS

No Results



PROCEDURES





                Procedure       Date Ordered    Result          Body Site

 

                COMPLETE CBC W/AUTO DIFF WBC 2012                    

 

                ASSAY THYROID STIM HORMONE 2012                    

 

                NATRIURETIC PEPTIDE 2012                    

 

                LIPID PANEL     2012                    

 

                COMPREHEN METABOLIC PANEL 2012                    

 

                ASSAY OF VITAMIN D 2012                    

 

                VENIPUNCT, ROUTINE* 2012                    







INSTRUCTIONS





MEDICATIONS ADMINISTERED

No Known Medications



MEDICAL (GENERAL) HISTORY





                    Type                Description         Date

 

                    Medical History     asthma-dx at age 11-12  

 

                          Medical History           hypertension-hx of pre-eclam

psia with second pregnancy, was 

induced at 35 weeks                      

 

                    Medical History     cardiomyopathy-postpartum (non-ischemic)

 (Stephanie)  

 

                    Medical History     depression           

 

                    Medical History     [facility]                  

 

                    Surgical History    tonsillectomy        

 

                    Surgical History    tubal               2014

 

                    Surgical History    tonsillectomy        

 

                          Surgical History          tubal [id number] dev

eloped respiratory issues and heart

failure following the surgery           2011

 

                    Surgical History    echo-2010, 8/19/10, , 11  

 

                          Hospitalization History   PPD #4 for non-ischemic card

iomyopathy, respiratory 

distress due to pulmonary edema, ARF    2010

 

                    Hospitalization History surgeries

## 2020-08-18 NOTE — XMS REPORT
[facility]

                             Created on: 2020



Love Chery

External Reference #: [id number]

: 1985

Sex: Female



Demographics





                          Address                   [address] [place]  [address]

 

                          Preferred Language        Unknown

 

                          Marital Status            Unknown

 

                          Mandaen Affiliation     Unknown

 

                          Race                      Unknown

 

                          Ethnic Group              Unknown





Author





                          Author                    Love Short

 

                          Organization              [facility]

 

                          Address                   [address] [place]  [address]



 

                          Phone                     [phone]







Care Team Providers





                    Care Team Member Name Role                Phone

 

                    RANGEL Short    Unavailable         [phone]







PROBLEMS





          Type      Condition ICD9-CM Code IRL33-NJ Code Onset Dates Condition S

tatus SNOMED 

Code

 

          Problem   Nondependent tobacco use disorder 305.1                     

    Active    508068683

 

          Problem   Seasonal allergic rhinitis due to pollen           J30.1    

           Active    71645826

 

                          Problem                   Heart failure, unspecified H

F chronicity, unspecified heart failure type

                          I50.9                     Active       14323913

 

          Problem   Obesity, unspecified 278.00                        Active   

 858213959

 

          Problem   Other and unspecified hyperlipidemia 272.4                  

       Active    92532635

 

          Problem   Asthma              J45.909             Active    521606397

 

          Problem   Major depressive disorder, recurrent episode, severe 296.33 

                       Active    

[id number]







ALLERGIES

No Information



ENCOUNTERS





                Encounter       Location        Date            Diagnosis

 

                    [place] [address] [address] 09 

Sep, 2019                                

 

                    [place] [address] [address]                                 

 

                    [place] [address] [address] 17 

May, 2019                                

 

                    [place] [address] [address] 14 

May, 2019                                

 

                    [place] [address] [address] 28 

Mar, 2019                                

 

                    [place] [address] [address] 06 

Mar, 2019                               Morbid obesity E66.01 and Localized gilmar

a R60.0

 

                    [place] [address] [address]                                 

 

                    [place] [address] [address] 10 

Charles, 2019                                

 

                    [place] [address] [address] 10 

Charles, 2019                               Heart failure, unspecified HF chronicity

, unspecified heart failure 

type I50.9

 

                          [facility]  [id number]

[address] [place] [address] 

[address]                              Seasonal allergic rhinitis d

ue to pollen J30.1 and 

Asthma J45.909

 

                          [facility]  [facility] [address] 

[address]                              Seasonal allergic rhinitis d

ue to pollen J30.1

 

                          [facility] WALK IN [place] [address] 

[address]                19 Oct, 2016              Acute upper respiratory infe

ction, unspecified J06.9

 

                    [place] [address] [address] 05 

Oct, 2016                                

 

                          [facility] WALK IN [place] [address] 

[address]                13 May, 2016              Environmental allergies Z91.

09

 

                    [place] [address] [address] 24 

Aug, 2015                               Abscess 682.9

 

                    [place] [address] [address] 17 

Aug, 2015                               Mood disorder 296.90

 

                    [place] [address] [address]                                Screen for STD (sexually transmitted dis

ease) V74.5

 

                    [place] [address] [address] 15 

Jul, 2015                                

 

                    [place] [address] [address]                                Depression 311

 

                    [place] [address] [address]                                Major depressive disorder, recurrent epi

sode, severe 296.33 and No 

condition on Axis II V71.09

 

                    [facility] [address] [facility] [place] [address] 10 

2015                                

 

                    [facility] [address] [facility] [place] [address] 14 

2015                                

 

                    [facility] [address] [place] [id number] [place] [address] 13 

2015                                

 

                    [facility] [address] [facility] [place] [address] 16 

Sep, 2014                                

 

                    [facility] [address] [facility] [place] [address] 16 

Sep, 2014                                

 

                    [facility] [address] [place] [id number] [place] [address] 15 

Sep, 2014                                

 

                    [facility] [id number] [place] [address] 15 

Sep, 2014                                

 

                    [facility] [address] [facility] [place] [address] 12 

Sep, 2014                                

 

                    [facility] [address] [facility] [place] [address] 12 

Sep, 2014                                

 

                    [facility] [id number] [place] [address] 06 

Aug, 2014                                

 

                    [facility] [id number] [place] [address] 06 

Aug, 2014                                

 

                    [facility] [id number] [place] [address]                                 

 

                    [facility] [address] [facility] [place] [address]                                 

 

                    [facility] [id number] [place] [address] 10 

Adam, 2014                                

 

                    [facility] [address] [facility] [place] [address] 10 

Adam, 2014                                

 

                    [facility] [id number] [place] [address] 07 

May, 2014                                

 

                    [facility] [address] [facility] [place] [address] 07 

May, 2014                                

 

                    [facility] [address] [facility][note 3] [id number] [place] [address] 05 

May, 2014                                

 

                    [facility] [address] [facility] [place] [address] 05 

May, 2014                                

 

                    [facility] [address] [facility] [place] [address]                                 

 

                    [facility] [address] [facility] [place] [address]                                 

 

                    [facility] [address] [facility] [place] [address] 25 

Mar, 2014                                

 

                    [facility] [address] [facility] [place] [address] 24 

Mar, 2014                                

 

                    [facility] [address] [facility] [place] [address] 24 

Mar, 2014                                

 

                    [facility] [address] [facility] [place] [address] 13 

Mar, 2014                                

 

                    [facility] [address] [facility] [place] [address] 13 

Mar, 2014                                

 

                    [facility] [address] [facility] [place] [address]                                 

 

                    [facility] [address] [facility] [place] [address]                                 

 

                    [facility] [address] [facility] [place] [address]                                 

 

                    [facility] [address] [facility] [place] [address]                                 

 

                    [facility] [address] [facility] [place] [address]                                 

 

                    [facility] [address] [facility] [place] [address]                                 

 

                    [facility] [address] [facility] [place] [address]                                 

 

                    [facility] [address] [facility] [place] [address] 19 

Dec, 2013                                

 

                    [facility] [address] [facility] [place] [address] 19 

Dec, 2013                                

 

                    [facility] [address] [facility] [place] [address]                                 

 

                    [facility] [address] [facility] [place] [address]                                 

 

                    [facility] [address] [facility] [place] [address]                                 

 

                    [facility] [address] [facility] [place] [address]                                 

 

                    [facility] [address] [facility] [place] [address] 27 

Sep, 2013                                

 

                    [facility] [id number] [place] [address] 05 

Sep, 2013                                

 

                    [facility] [address] [facility] [place] [address]                                 

 

                    [facility] [id number] [place] [address]                                 

 

                    [facility] [address] [facility] [place] [address]                                 

 

                    [facility] [address] [facility] [place] [address]                                 

 

                    [facility] [address] [facility] [place] [address] 31 

May, 2013                                

 

                    [facility] [address] [facility] [place] [address] 06 

May, 2013                                

 

                    [facility] [address] [facility] [place] [address]                                 

 

                    [facility] [address] [facility] [place] [address] 28 

Mar, 2013                                

 

                    [facility] [address] [facility] [place] [address] 18 

Mar, 2013                                

 

                    [facility] [address] [facility][note 3] [id number] [place] [address] 14 

Mar, 2013                                

 

                    [facility] [address] [facility] [place] [address] 13 

Mar, 2013                                

 

                    [facility] [address] [facility] [place] [address] 12 

Mar, 2013                                

 

                    [facility] [address] [facility] [place] [address] 11 

Mar, 2013                                

 

                    [facility] [address] [facility] [place] [address] 06 

Mar, 2013                                

 

                    [facility] [address] [facility] [place] [address] 06 

Mar, 2013                                

 

                    [facility] [address] [facility] [place] [address] 06 

Mar, 2013                                

 

                    [facility] [address] [facility] [place] [address] 06 

Mar, 2013                                

 

                    [facility] [address] [facility] [place] [address] 05 

Mar, 2013                                

 

                    [facility] [address] [facility] [place] [address]                                 

 

                    [facility] [address] [facility] [place] [address]                                 

 

                    [facility] [address] [facility] [place] [address]                                 

 

                    [facility] [address] [facility] [place] [address]                                 

 

                    [facility] [address] [id number] [place] [address]                                 

 

                [facility] [address] [place] [address]

 Aug, 2012     

 

                    [facility] [address] [facility] [place] [address]                                 

 

                    [facility] [address] [facility] [place] [address]                                 

 

                    [facility] [address] [facility] [place] [address]                                 

 

                    [facility] [address] [facility] [place] [address]                                 

 

                    [facility] [address] [facility] [place] [address]                                 

 

                    [facility] [address] [facility] [place] [address]                                 

 

                    [facility] [address] [facility] [place] [address]                                 

 

                    [facility] [address] [facility] [place] [address]                                 

 

                    [facility] [address] [facility] [place] [address] 26 

Mar, 2012                                

 

                    [facility] [address] [facility] [place] [address]                                 

 

                    [facility] [address] [facility] [place] [address]                                 

 

                    [facility] [address] [facility] [place] [address] 12 

Dec, 2011                                

 

                    [facility] [address] [facility] [place] [address]                                 

 

                    [facility] [address] [facility] [place] [address]                                 

 

                    [facility] [address] [facility] [place] [address] 16 

2011                                

 

                    [facility] [id number] [place] [address]                                 

 

                    [facility] [address] [facility] [place] [address] 31 

Oct, 2011                                

 

                    [facility] [address] [facility] [place] [address] 17 

Oct, 2011                                

 

                    [facility] [address] [facility] [place] [address] 17 

Oct, 2011                                

 

                    [facility] [id number] [place] [address] 10 

Oct, 2011                                

 

                    [facility] [address] [facility] [place] [address] 22 

Dec, 2010                                

 

                    [facility] [id number] [place] [address]                                 

 

                    [facility] [id number] [place] [address]                                 

 

                    [facility] [id number] [place] [address] 19 

May, 2010                                

 

                    [facility] [id number] [place] [address] 17 

May, 2010                                

 

                    [facility] [id number] [place] [address] 13 

May, 2010                                

 

                    [facility] [id number] [place] [address] 11 

May, 2010                                

 

                    [facility] [id number] [place] [address] 10 

May, 2010                                

 

                    [facility] [id number] [place] [address] 11 

Dec, 2009                                

 

                    [facility] [id number] [place] [address] 11 

Dec, 2009                                

 

                    [facility] [address] [facility] [place] [address]                                 

 

                    [facility] [id number] [place] [address]                                 







IMMUNIZATIONS

No Known Immunizations



SOCIAL HISTORY

Never Assessed



REASON FOR VISIT





PLAN OF CARE





VITAL SIGNS





                    Height              66 in               2014

 

                    Weight              276.2 lbs           2014

 

                    Temperature         98.6 degrees Fahrenheit 2014

 

                    Heart Rate          82 bpm              2014

 

                    Respiratory Rate    20                  2014

 

                    Blood pressure systolic 100 mmHg            2014

 

                    Blood pressure diastolic 70 mmHg             2014







MEDICATIONS

Unknown Medications



RESULTS

No Results



PROCEDURES





                Procedure       Date Ordered    Result          Body Site

 

                URINE CULTURE/COLONY COUNT 2014                   

 

                URINALYSIS, AUTO, W/O SCOPE 2014                   







INSTRUCTIONS





MEDICATIONS ADMINISTERED

No Known Medications



MEDICAL (GENERAL) HISTORY





                    Type                Description         Date

 

                    Medical History     asthma-dx at age 11-12  

 

                          Medical History           hypertension-hx of pre-eclam

psia with second pregnancy, was 

induced at 35 weeks                      

 

                    Medical History     cardiomyopathy-postpartum (non-ischemic)

 (Stephanie)  

 

                    Medical History     depression           

 

                    Medical History     [facility]                  

 

                    Surgical History    tubal               

 

                    Surgical History    tonsillectomy        

 

                    Hospitalization History [facility]                 2019

## 2020-08-18 NOTE — XMS REPORT
Clinical Summary

                             Created on: 2020



Love Chery

External Reference #: [id number]

: 1985

Sex: Female



Demographics





                          Address                   [address]

[place]  [address]

 

                          Home Phone                [phone]

 

                          Preferred Language        English

 

                          Marital Status            Unknown

 

                          Zoroastrian Affiliation     [facility]

 

                          Race                      White

 

                          Ethnic Group              Not  or 





Author





                          Author                    [facility]

 

                          Organization              [facility]

 

                          Address                   Unknown

 

                          Phone                     Unavailable







Support





                Name            Relationship    Address         Phone

 

                Ángela Rosenberg ECON            Unknown         [phone]







Care Team Providers





                    Care Team Member Name Role                Phone

 

                    Self, Referral      PCP                 Unavailable

 

                    Felipe Mancilla DO    Unavailable         Unavailable







Source Comments

Some departments are not documenting in the electronic medical record.  If you d
o not see the information that you expected, contact Release of Information in [facility] Health Information Management department at [phone] for further assistan
ce in locating additional records.[facility]



Allergies





                                        Comments



                 Active Allergy  Reactions       Severity        Noted Date 

 

                                         



                     Paroxetine Hcl      RASH                2014 







Medications





                          End Date                  Status



              Medication   Sig          Dispensed    Refills      Start  



                                         Date  

 

                                                    Active



                     aspirin 325 mg tablet  Take 325 mg         0   



                                         by mouth     



                                         daily.     

 

                                                    Active



                     potassium chloride SR  Take 10 mEq         0   



                           (K-DUR) 10 mEq tablet     by mouth     



                                         daily.     

 

                                                    Active



                     furosemide (LASIX) 40 mg  Take 40 mg by       0   



                           tablet                    mouth daily.     

 

                                                    Active



                     carvedilol (COREG) 12.5  Take 12.5 mg        0   



                           mg tablet                 by mouth     



                                         twice daily     



                                         with meals.     

 

                                                    Active



                     omeprazole DR(+)    Take 20 mg by       0   



                           (PRILOSEC) 20 mg capsule  mouth daily.     

 

                                                    Active



                     spironolactone      Take 25 mg by       0   



                           (ALDACTONE) 25 mg tablet  mouth daily.     

 

                                                    Active



                     enalapril (VASOTEC) 5 mg  Take 5 mg by        0   



                           tablet                    mouth daily.     

 

                                                    Active



                     digoxin (LANOXIN) 125 mcg  Take 0.125 mg       0   



                           tablet                    by mouth     



                                         daily.     

 

                                                    Active



                     niacin (NIACIN) 100 mg  Take 100 mg         0   



                           tab                       by mouth.     

 

                                                    Active



                     albuterol (VENTOLIN HFA,  Inhale 2            0   



                           PROAIR HFA) 90            Puffs by     



                           mcg/actuation inhaler     mouth every 6     



                                         hours as     



                                         needed.     

 

                                                    Active



              fluticasone (FLONASE) 50  i spray each  1 Inhaler    1            

  



                     mcg/actuation nasal spray  nostril x 1         4  



                                         wk, then as     



                                         needed     

 

                                                    Active



              permethrin (ELIMITE) 5 %  Apply per    1 Container  2            0

3/06/201  



                     topical cream       scabies             4  



                                         instructions     



                                         and repeat in     



                                         7 days     







Active Problems





No known active problems



Social History





                                        Date



                 Tobacco Use     Types           Packs/Day       Years Used 

 

                                         



                           Current Every Day Smoker  Cigarettes   

 

    



                                         Smokeless Tobacco: Never   



                                         Used   







                    Drinks/Week         oz/Week             Comments



                                         Alcohol Use   

 

                                                             



                                         Not Asked   







 



                           Sex Assigned at Birth     Date Recorded

 

 



                                         Not on file 







Last Filed Vital Signs





                    Reading             Time Taken          Comments



                                         Vital Sign   

 

                    130/90              2014 12:26 PM CST  



                                         Blood Pressure   

 

                    76                  2014 12:26 PM CST  



                                         Pulse   

 

                    36.7 C (98.1 F) 2014 12:26 PM CST  



                                         Temperature   

 

                    16                  2014 12:26 PM CST  



                                         Respiratory Rate   

 

                    -                   -                    



                                         Oxygen Saturation   

 

                    -                   -                    



                                         Inhaled Oxygen   



                                         Concentration   

 

                    130.4 kg (287 lb 6.4 oz) 2014 12:26 PM CST  



                                         Weight   

 

                    166.6 cm (5' 5.6")  2014 12:26 PM CST  



                                         Height   

 

                    46.96               2014 12:26 PM CST  



                                         Body Mass Index   







Plan of Treatment





   



                 Health Maintenance  Due Date        Last Done       Comments

 

   



                           HIV SCREENING             1998  

 

   



                           DTAP/TDAP VACCINES (1 -   2001  



                                         Tdap)   

 

   



                           HEPATITIS C SCREENING     2001  

 

   



                           PHYSICAL (COMPREHENSIVE)  2001  



                                         EXAM   

 

   



                           CERVICAL CANCER SCREENING  2004  

 

   



                           INFLUENZA VACCINE         10/01/2020  







Results

Not on filefrom Last 3 Months



Insurance





                                        Type



            Payer      Benefit    Subscriber ID  Effective  Phone      Address 



                           Plan /                    Dates   



                                         Group     

 

                                        Medicaid



                 [facility] MEDICAID [facility]             [id number]     3/1/2014-P   



                           COMMUNITY                 resent   



                                         [place]AN [place]     







     



            Guarantor Name  Account    Relation to  Date of    Phone      Vaishali rodriges Address



                     Type                Patient             Birth  

 

     



            Love Chery  Personal/F  Self       1985  [phone]  [phone]

[address] [place] [address] [address] 

[address]



                     amily               (Home)              [place]



                                         [address]







Advance Directives





                          Patient Representative    Explanation



                           Type                      Date Recorded  

 

                                                     



                           Advance                   3/6/2014 11:22 AM  



                                         Directive/DPOA

## 2020-08-18 NOTE — XMS REPORT
[facility]

                             Created on: 2020



Love Chery

External Reference #: [id number]

: 1985

Sex: Female



Demographics





                          Address                   [address] [place]  [address]

 

                          Preferred Language        Unknown

 

                          Marital Status            Unknown

 

                          Zoroastrianism Affiliation     Unknown

 

                          Race                      Unknown

 

                          Ethnic Group              Unknown





Author





                          Author                    Love SHETH

 

                          [facility]

 

                          Address                   [address] [place]  [address]



 

                          Phone                     [phone]







Care Team Providers





                    Care Team Member Name Role                Phone

 

                    MREYL  JACKIE        Unavailable         [phone]







PROBLEMS





          Type      Condition ICD9-CM Code MRS18-HH Code Onset Dates Condition S

tatus SNOMED 

Code

 

          Problem   Nondependent tobacco use disorder 305.1                     

    Active    447675351

 

          Problem   Seasonal allergic rhinitis due to pollen           J30.1    

           Active    72880211

 

                          Problem                   Heart failure, unspecified H

F chronicity, unspecified heart failure type

                          I50.9                     Active       54536514

 

          Problem   Obesity, unspecified 278.00                        Active   

 300546341

 

          Problem   Other and unspecified hyperlipidemia 272.4                  

       Active    19012049

 

          Problem   Asthma              J45.909             Active    237825557

 

          Problem   Major depressive disorder, recurrent episode, severe 296.33 

                       Active    

409572259730







ALLERGIES

No Information



ENCOUNTERS





                Encounter       Location        Date            Diagnosis

 

                    [place] [address] [address] 09 

Sep, 2019                                

 

                    [place] [address] [address]                                 

 

                    [place] [address] [address] 17 

May, 2019                                

 

                    [place] [address] [address] 14 

May, 2019                                

 

                    [place] [address] [address] 28 

Mar, 2019                                

 

                    [place] [address] [address] 06 

Mar, 2019                               Morbid obesity E66.01 and Localized gilmar

a R60.0

 

                    [place] [address] [address]                                 

 

                    [place] [address] [address] 10 

Charles, 2019                                

 

                    [place] [address] [address] 10 

Charles, 2019                               Heart failure, unspecified HF chronicity

, unspecified heart failure 

type I50.9

 

                          [facility]  [address] [id number]

[address] [place] [address] 

[address]                              Seasonal allergic rhinitis d

ue to pollen J30.1 and 

Asthma J45.909

 

                          [facility]  [address] 

[address]                              Seasonal allergic rhinitis d

ue to pollen J30.1

 

                          [facility] WALK IN [address] 

[address]                19 Oct, 2016              Acute upper respiratory infe

ction, unspecified J06.9

 

                    [place] [address] [address] 05 

Oct, 2016                                

 

                          [facility] WALK IN [place] [address] 

[address]                13 May, 2016              Environmental allergies Z91.

09

 

                    [place] [address] [address] 24 

Aug, 2015                               Abscess 682.9

 

                    [place] [address] [address] 17 

Aug, 2015                               Mood disorder 296.90

 

                    [place] [address] [address]                                Screen for STD (sexually transmitted dis

ease) V74.5

 

                    [place] [address] [address] 15 

Jul, 2015                                

 

                    [place] [address] [address]                                Depression 311

 

                    [place] [address] [address]                                Major depressive disorder, recurrent epi

sode, severe 296.33 and No 

condition on Axis II V71.09

 

                    [facility] [address] [facility] [place] [address] 10 

2015                                

 

                    [facility] [address] [id number] [place] [address] 14 

2015                                

 

                    [facility] [address] [id number] [place] [address] 13 

2015                                

 

                    [facility] [address] [id number] [place] [address] 16 

Sep, 2014                                

 

                    [facility] [address] [id number] [place] [address] 16 

Sep, 2014                                

 

                    [facility] [address] [id number] [place] [address] 15 

Sep, 2014                                

 

                    [facility] [address] [id number] [place] [address] 15 

Sep, 2014                                

 

                    [facility] [address] [id number] [place] [address] 12 

Sep, 2014                                

 

                    [facility] [address] [id number] [place] [address] 12 

Sep, 2014                                

 

                    [facility] [address] [id number] [place] [address] 06 

Aug, 2014                                

 

                    [facility] [address] [id number] [place] [address] 06 

Aug, 2014                                

 

                    [facility] [address] [id number] [place] [address]                                 

 

                    [facility] [address] [id number] [place] [address]                                 

 

                    [facility] [address] [id number] [place] [address] 10 

Adam, 2014                                

 

                    [facility] [address] [id number] [place] [address] 10 

Adam, 2014                                

 

                    [facility] [address] [id number] [place] [address] 07 

May, 2014                                

 

                    [facility] [address] [id number] [place] [address] 07 

May, 2014                                

 

                    [facility] [address] [id number] [place] [address] 05 

May, 2014                                

 

                    [facility] [address] [id number] [place] [address] 05 

May, 2014                                

 

                    [facility] [address] [id number] [place] [address]                                 

 

                    [facility] [address] [id number] [place] [address]                                 

 

                    [facility] [address] [id number] [place] [address] 25 

Mar, 2014                                

 

                    [facility] [address] [id number] [place] [address] 24 

Mar, 2014                                

 

                    [facility] [address] [id number] [place] [address] 24 

Mar, 2014                                

 

                    [facility] [address] [facility] [place] [address] 13 

Mar, 2014                                

 

                    [facility] [address] [facility] [place] [address] 13 

Mar, 2014                                

 

                    [facility] [address] [id number] [place] [address]                                 

 

                    [facility] [address] [id number] [place] [address]                                 

 

                    [facility] [address] [id number] [place] [address]                                 

 

                    [facility] [address] [id number] [place] [address]                                 

 

                    [facility] [address] [id number] [place] [address]                                 

 

                    [facility] [address] [id number] [place] [address]                                 

 

                    [facility] [address] [id number] [place] [address]                                 

 

                    [facility] [address] [id number] [place] [address] 19 

Dec, 2013                                

 

                    [facility] [address] [id number] [place] [address] 19 

Dec, 2013                                

 

                    [facility] [address] [id number] [place] [address]                                 

 

                    [facility] [address] [place] [id number] [place] [address]                                 

 

                    [facility] [address] [id number] [place] [address]                                 

 

                    [facility] [address] [id number] [place] [address]                                 

 

                    [facility] [address] [id number] [place] [address] 27 

Sep, 2013                                

 

                    [facility] [address] [place] [id number] [place] [address] 05 

Sep, 2013                                

 

                    [facility] [address] [place] [id number] [place] [address]                                 

 

                    [facility] [address] [facility] [place] [address]                                 

 

                    [facility] [address] [place] [id number] [place] [address]                                 

 

                    [facility] [address] [place] [id number] [place] [address]                                 

 

                    [facility] [address] [facility] [place] [address] 31 

May, 2013                                

 

                    [facility] [address] [id number] [place] [address] 06 

May, 2013                                

 

                    [facility] [address] [place] [id number] [place] [address]                                 

 

                    [facility] [address] [id number] [place] [address] 28 

Mar, 2013                                

 

                    [facility] [address] [place] [id number] [place] [address] 18 

Mar, 2013                                

 

                    [facility] [address] [id number] [place] [address] 14 

Mar, 2013                                

 

                    [facility] [address] [id number] [place] [address] 13 

Mar, 2013                                

 

                    [facility] [address] [id number] [place] [address] 12 

Mar, 2013                                

 

                    [facility] [address] [id number] [place] [address] 11 

Mar, 2013                                

 

                    [facility] [address] [id number] [place] [address] 06 

Mar, 2013                                

 

                    [facility] [address] [id number] [place] [address] 06 

Mar, 2013                                

 

                    [facility] [address] [id number] [place] [address] 06 

Mar, 2013                                

 

                    [facility] [address] [facility] [place] [address] 06 

Mar, 2013                                

 

                    [facility] [address] [id number] [place] [address] 05 

Mar, 2013                                

 

                    [facility] [address] [id number] [place] [address]                                 

 

                    [facility] [address] [id number] [place] [address]                                 

 

                    [facility] [address] [id number] [place] [address]                                 

 

                    [facility] [address] [id number] [place] [address]                                 

 

                    [facility] [address] [id number] [place] [address]                                 

 

                [facility] [address] [place] [address] 20

 Aug, 2012     

 

                    [facility] [address] [id number] [place] [address]                                 

 

                    [facility] [address] [id number] [place] [address]                                 

 

                    [facility] [address] [id number] [place] [address]                                 

 

                    [facility] [address] [id number] [place] [address]                                 

 

                    [facility] [address] [id number] [place] [address]                                 

 

                    [facility] [address] [id number] [place] [address]                                 

 

                    [facility] [address] [id number] [place] [address]                                 

 

                    [facility] [address] [id number] [place] [address]                                 

 

                    [facility] [address] [id number] [place] [address] 26 

Mar, 2012                                

 

                    [facility] [address] [id number] [place] [address]                                 

 

                    [facility] [address] [id number] [place] [address]                                 

 

                    [facility] [address] [id number] [place] [address] 12 

Dec, 2011                                

 

                    [facility] [address] [id number] [place] [address]                                 

 

                    [facility] [address] [id number] [place] [address]                                 

 

                    [facility] [address] [id number] [place] [address] 16 

2011                                

 

                    [facility] [address] [id number] [place] [address]                                 

 

                    [facility] [address] [id number] [place] [address] 31 

Oct, 2011                                

 

                    [facility] [address] [id number] [place] [address] 17 

Oct, 2011                                

 

                    [facility] [address] [id number] [place] [address] 17 

Oct, 2011                                

 

                    [facility] [id number] [place] [address] 10 

Oct, 2011                                

 

                    [facility] [id number] [place] [address] 22 

Dec, 2010                                

 

                    [facility] [address] [id number] [place] [address]                                 

 

                    [facility] [address] [id number] [place] [address]                                 

 

                    [facility] [address] [id number] [place] [address] 19 

May, 2010                                

 

                    [facility] [id number] [place] [address] 17 

May, 2010                                

 

                    [facility] [address] [id number] [place] [address] 13 

May, 2010                                

 

                    [facility] [address] [id number] [place] [address] 11 

May, 2010                                

 

                    [facility] [address] [id number] [place] [address] 10 

May, 2010                                

 

                    [facility] [address] [id number] [place] [address] 11 

Dec, 2009                                

 

                    [facility] [id number] [place] [address] 11 

Dec, 2009                                

 

                    [facility] [id number] [place] [address]                                 

 

                    [facility] [address] [id number] [place] [address]                                 







IMMUNIZATIONS

No Known Immunizations



SOCIAL HISTORY

Never Assessed



REASON FOR VISIT





PLAN OF CARE





VITAL SIGNS





MEDICATIONS

Unknown Medications



RESULTS

No Results



PROCEDURES

No Known procedures



INSTRUCTIONS





MEDICATIONS ADMINISTERED

No Known Medications



MEDICAL (GENERAL) HISTORY





                    Type                Description         Date

 

                    Medical History     asthma-dx at age 11-12  

 

                          Medical History           hypertension-hx of pre-eclam

psia with second pregnancy, was 

induced at 35 weeks                      

 

                    Medical History     cardiomyopathy-postpartum (non-ischemic)

 (Stephanie)  

 

                    Medical History     depression           

 

                    Medical History     [facility]                  

 

                    Surgical History    tubal               2014

 

                    Surgical History    tonsillectomy        

 

                    Hospitalization History [facility]                 2019

## 2020-08-18 NOTE — XMS REPORT
[facility]

                             Created on: 2020



Love Chery

External Reference #: [id number]

: 1985

Sex: Female



Demographics





                          Address                   [address] [place]  [address]

 

                          Preferred Language        Unknown

 

                          Marital Status            Unknown

 

                          Scientology Affiliation     Unknown

 

                          Race                      Unknown

 

                          Ethnic Group              Unknown





Author





                          Author                    Love SHETH

 

                          [facility]

 

                          Address                   [address] [place]  [address]



 

                          Phone                     [phone]







Care Team Providers





                    Care Team Member Name Role                Phone

 

                    MERYLEDGARDA        Unavailable         [phone]







PROBLEMS





          Type      Condition ICD9-CM Code ZZK86-OC Code Onset Dates Condition S

tatus SNOMED 

Code

 

          Problem   Seasonal allergic rhinitis due to pollen           J30.1    

           Active    [id number]

 

                          Problem                   Heart failure, unspecified H

F chronicity, unspecified heart failure type

                          I50.9                     Active       74712909

 

          Problem   Asthma              J45.909             Active    [id number]







ALLERGIES

No Information



ENCOUNTERS





                Encounter       Location        Date            Diagnosis

 

                          [facility]  [address] [id number]

[address] [place] [address] 

[address]                09 May, 2020              Dental abscess K04.7

 

                          [facility]  [address] [id number]

[address] [place] [address] 

[address]                              Acute left-sided low back pa

in without sciatica M54.5

 

                          [facility]  [address] [facility] [id number]

[address] [place] [address] 

[address]                05 Mar, 2020              Sore throat J02.9

 

                          [facility]     [address] [id number]

[address] [place] [address]

                          09 Sep, 2019               

 

                          [facility]     [address] [id number]

[address] [place] [address]

                                         

 

                          [facility]     [address] [id number]

[address] [place] [address]

                          17 May, 2019               

 

                          [facility]     [address] [facility] [id number]

[address] [place] [address]

                          14 May, 2019               

 

                          [facility]     [address] [facility] [id number]

[address] [place] [address]

                          28 Mar, 2019               

 

                          [facility]     [address] [address]

                          06 Mar, 2019              Morbid obesity E66.01 and Lo

calized edema R60.0

 

                          [place] [address] [address]

                          14 2019               

 

                          [facility]     [address] [address]

                          10 Charles, 2019               

 

                          [facility]     [address] [address]

                          10 Charles, 2019              Heart failure, unspecified H

F chronicity, unspecified heart failure

type I50.9

 

                          [facility]  [address] 

[address]                              Seasonal allergic rhinitis d

ue to pollen J30.1 and 

Asthma J45.909

 

                          [facility] WALK IN [place] [address] 

[address]                              Seasonal allergic rhinitis d

ue to pollen J30.1

 

                          [facility]  [address] 

[address]                19 Oct, 2016              Acute upper respiratory infe

ction, unspecified J06.9

 

                          [place] [address] [address]

                          05 Oct, 2016               

 

                          [facility] IN [facility]  [address] 

[address]                13 May, 2016              Environmental allergies Z91.

09

 

                          [facility]     [address] [address]

                          24 Aug, 2015              Abscess 682.9

 

                          [place] [address] [address]

                          17 Aug, 2015              Mood disorder 296.90

 

                          [place] [address] [address]

                                        Screen for STD (sexually tra

nsmitted disease) V74.5

 

                          [place] [address] [place] [address]

[address] [place] [address]

                          15 2015               

 

                          [facility]     [address] [id number]

[address] [place] [address]

                          13 2015              Depression 311

 

                          [facility]     [address] [id number]

[address] [place] [address]

                          13 2015              Major depressive disorder, r

ecurrent episode, severe 296.33 and No 

condition on Axis II V71.09

 

                          [facility]     [address] [id number]

[address] [place] [address]

                          10 2015               

 

                          [facility]     [address] [id number]

[address] [place] [address]

                          14 2015               

 

                          [facility]     [address] [id number]

[address] [place] [address]

                          13 2015               

 

                          [facility]     [address] [id number]

[address] [place] [address]

                          16 Sep, 2014               

 

                          [facility]     [address] [id number]

[address] [place] [address]

                          16 Sep, 2014               

 

                          [facility]     [address] [id number]

[address] [place] [address]

                          15 Sep, 2014               

 

                          [facility]     [address] [id number]

[address] [place] [address]

                          15 Sep, 2014               

 

                          [facility]     [address] [id number]

[address] [place] [address]

                          12 Sep, 2014               

 

                          [facility]     [address] [id number]

[address] [place] [address]

                          12 Sep, 2014               

 

                          [facility]     [address] [id number]

[address] [place] [address]

                          06 Aug, 2014               

 

                          [facility]     [address] [id number]

[address] [place] [address]

                          06 Aug, 2014               

 

                          [facility]     [address] [id number]

[address] [place] [address]

                                         

 

                          [facility]     [address] [id number]

[address] [address]

                                         

 

                          [facility]     [address] [id number]

[address] [place] [address]

                          10 Adam, 2014               

 

                          [facility]     [address] [id number]

[address] [place] [address]

                          10 Adam, 2014               

 

                          [facility]     [address] [id number]

[address] [place] [address], [place] [address]

                          07 May, 2014               

 

                          [facility]     [address] [id number]

[address] [place] [address], [place] [address]

                          07 May, 2014               

 

                          [facility]     [address] [id number]

[address] [place] [address], [place] [address]

                          05 May, 2014               

 

                          [facility]     [address] [id number]

[address] [place] [address], [place] [address]

                          05 May, 2014               

 

                          [facility]     [address] [id number]

[address] [place] [address], [place] [address]

                                         

 

                          [facility]     [address] [id number]

[address] [place] [address], [place] [address]

                                         

 

                          [facility]     [address] [id number]

[address] [place] [address], [place] [address]

                          25 Mar, 2014               

 

                          [facility]     [address] [id number]

[address] [place] [address], [place] [address]

                          24 Mar, 2014               

 

                          [facility]     [address] [id number]

[address] [place] [address], [place] [address]

                          24 Mar, 2014               

 

                          [facility]     [address] [id number]

[address] [place] [address], [place] [address]

                          13 Mar, 2014               

 

                          [facility]     [address] [id number]

[address] [place] [address], [place] [address]

                          13 Mar, 2014               

 

                          [facility]     [address] [id number]

[address] [place] [address], [place] [address]

                                         

 

                          [facility]     [address] [id number]

[address] [place] [address], [place] [address]

                                         

 

                          [facility]     [address] [id number]

[address] [place] [address], [place] [address]

                                         

 

                          [facility]     [address] [id number]

[address] [place] [address], [place] [address]

                                         

 

                          [facility]     [address] [id number]

[address] [place] [address], [place] [address]

                                         

 

                          [facility]     [address] [id number]

[address] [place] [address], [place] [address]

                                         

 

                          [facility]     [address] [id number]

[address] [place] [address], [place] [address]

                                         

 

                          [facility]     [address] [id number]

[address] [place] [address], [place] [address]

                          19 Dec, 2013               

 

                          [facility]     [address] [id number]

[address] [place] [address], [place] [address]

                          19 Dec, 2013               

 

                          [facility]     [address] [id number]

[address] [place] [address], [place] [address]

                                         

 

                          [facility]     [address] [id number]

[address] [place] [address], [place] [address]

                                         

 

                          [facility]     [address] [id number]

[address] [place] [address], [place] [address]

                                         

 

                          [facility]     [address] [id number]

[address] [place] [address], [place] [address]

                                         

 

                          [facility]     [address] [id number]

[address] [place] [address], [place] [address]

                          27 Sep, 2013               

 

                          [facility]     [address] [id number]

[address] [place] [address], [place] [address]

                          05 Sep, 2013               

 

                          [facility]     [address] [id number]

[address] [place] [address], [place] [address]

                                         

 

                          [facility]     [address] [id number]

[address] [place] [address], [place] [address]

                                         

 

                          [facility]     [address] [id number]

[address] [place] [address]

                          05 2013               

 

                          [facility]     [address] [id number]

[address] [place] [address], [place] [address]

                                         

 

                          [facility]     [address] [id number]

[address] [place] [address], [place] [address]

                          31 May, 2013               

 

                          [facility]     [address] [id number]

[address] [place] [address], [place] [address]

                          06 May, 2013               

 

                          [facility]     [address] [id number]

[address] [place] [address], [place] [address]

                                         

 

                          [facility]     [address] [id number]

[address] [place] [address], [place] [address]

                          28 Mar, 2013               

 

                          [facility]     [address] [id number]

[address] [place] [address], [place] [address]

                          18 Mar, 2013               

 

                          [facility]     [address] [id number]

[address] [place] [address], [place] [address]

                          14 Mar, 2013               

 

                          [facility]     [address] [id number]

[address] [place] [address], [place] [address]

                          13 Mar, 2013               

 

                          [facility]     [address] [id number]

[address] [place] [address], [place] [address]

                          12 Mar, 2013               

 

                          [facility]     [address] [id number]

[address] [place] [address], [place] [address]

                          11 Mar, 2013               

 

                          [facility]     [address] [id number]

[address] [place] [address], [place] [address]

                          06 Mar, 2013               

 

                          [facility]     [address] [id number]

[address] [place] [address], [place] [address]

                          06 Mar, 2013               

 

                          [facility]     [address] [id number]

[address] [place] [address], [place] [address]

                          06 Mar, 2013               

 

                          [facility]     [address] [id number]

[address] [place] [address], [place] [address]

                          06 Mar, 2013               

 

                          [facility]     [address] [id number]

[address] [place] [address], [place] [address]

                          05 Mar, 2013               

 

                          [facility]     [address] [id number]

[address] [place] [address], [place] [address]

                                         

 

                          [facility]     [address] [id number]

[address] [place] [address], [place] [address]

                                         

 

                          [facility]     [address] [id number]

[address] [place] [address], [place] [address]

                                         

 

                          [facility]     [address] [id number]

[address] [place] [address], [place] [address]

                                         

 

                          [facility]     [address] [id number]

[address] [place] [address], [place] [address]

                                         

 

                    [facility]     [address] [id number] [place], 

S [id number] 20 Aug, 2012

                                         

 

                          [facility]     [address] [id number]

[address] [place] [address], [place] [address]

                                         

 

                          [facility]     [address] [id number]

[address] [place] [address], [place] [address]

                                         

 

                          [facility]     [address] [id number]

[address] [place] [address], [place] [address]

                                         

 

                          [facility]     [address] [id number]

[address] [place] [address], [place] [address]

                                         

 

                          [facility]     [address] [id number]

[address] [place] [address], [place] [address]

                                         

 

                          [facility]     [address] [id number]

[address] [place] [address], [place] [address]

                                         

 

                          [facility]     [address] [id number]

[address] [place] [address], [place] [address]

                                         

 

                          [facility]     [address] [id number]

[address] [place] [address], [place] [address]

                                         

 

                          [facility]     [address] [id number]

[address] [place] [address], [place] [address]

                          26 Mar, 2012               

 

                          [facility]     [address] [id number]

[address] [place] [address], [place] [address]

                                         

 

                          [facility]     [address] [id number]

[address] [place] [address], [place] [address]

                                         

 

                          [facility]     [address] [id number]

[address] [place] [address], [place] [address]

                          12 Dec, 2011               

 

                          [facility]     [address] [id number]

[address] [place] [address], [place] [address]

                                         

 

                          [facility]     [address] [id number]

[address] [place] [address], [place] [address]

                                         

 

                          [facility]     [address] [id number]

[address] [place] [address], [place] [address]

                                         

 

                          [facility]     [address] [id number]

[address] [place] [address], [place] [address]

                                         

 

                          [facility]     [address] [id number]

[address] [place] [address], [place] [address]

                          31 Oct, 2011               

 

                          [facility]     [address] [id number]

[address] [place] [address], [place] [address]

                          17 Oct, 2011               

 

                          [facility]     [address] [id number]

[address] [place] [address], [place] [address]

                          17 Oct, 2011               

 

                          [facility]     [address] [id number]

[address] [place] [address], [place] [address]

                          10 Oct, 2011               

 

                          [facility]     [address] [id number]

[address] [place] [address], [place] [address]

                          22 Dec, 2010               

 

                          [facility]     [address] [id number]

[address] [place] [address]

                                         

 

                          [facility]     [address] [id number]

[address] [place] [address], [place] [address]

                                         

 

                          [facility]     [address] [id number]

[address] [place] [address], [place] [address]

                          19 May, 2010               

 

                          [facility]     [address] [id number]

[address] [place] [address], [place] [address]

                          17 May, 2010               

 

                          [facility]     [address] [id number]

[address] [place] [id number]

                          13 May, 2010               

 

                          [facility]     [address] [id number]

[address] [place] [id number]

                          11 May, 2010               

 

                          [facility]     [address] [id number]

[address] [place] [id number]

                          10 May, 2010               

 

                          [facility]     [address] [facility] [id number]

[address] [place] [id number]

                          11 Dec, 2009               

 

                          [facility]     [address] [facility] [id number]

[address] [place] [id number]

                          11 Dec, 2009               

 

                          [facility]     [address] [facility] [id number]

[address] [place] [phone]

                                         

 

                          [facility]     [address] [facility] [id number]

[address] [place] [id number]

                          11 2009               







IMMUNIZATIONS





                Vaccine         Route           Administration Date Status

 

                influenza IIV3 (history) Unknown         2013    Adminis

tered

 

                PRIVATE PPSV23 (PNEUMOVAX) Unknown         2013    Admin

istered







SOCIAL HISTORY

Never Assessed



REASON FOR VISIT





PLAN OF CARE





VITAL SIGNS





                    Height              66 in               2013

 

                    Weight              277.85 lbs          2013

 

                    Temperature         97 degrees Fahrenheit 2013

 

                    Heart Rate          80 bpm              2013

 

                    Respiratory Rate    28                  2013

 

                    Blood pressure systolic 118 mmHg            2013

 

                    Blood pressure diastolic 84 mmHg             2013







MEDICATIONS

No Known Medications



RESULTS

No Results



PROCEDURES





                Procedure       Date Ordered    Result          Body Site

 

                PERSISTENT ASTHMA 2013                     







INSTRUCTIONS





MEDICATIONS ADMINISTERED

No Known Medications



MEDICAL (GENERAL) HISTORY





                    Type                Description         Date

 

                    Medical History     asthma-dx at age 11-12  

 

                          Medical History           hypertension-hx of pre-eclam

psia with second pregnancy, was 

induced at 35 weeks                      

 

                    Medical History     cardiomyopathy-postpartum (non-ischemic)

 (Stephanie)  

 

                    Medical History     depression           

 

                    Medical History     [facility]                  

 

                    Surgical History    tonsillectomy        

 

                    Surgical History    tubal               2014

 

                    Surgical History    tonsillectomy        

 

                          Surgical History          tubal [id number] dev

eloped respiratory issues and heart

failure following the surgery           2011

 

                    Surgical History    echo-2010, 8/19/10, , 11  

 

                          Hospitalization History   PPD #4 for non-ischemic card

iomyopathy, respiratory 

distress due to pulmonary edema, ARF    2010

 

                    Hospitalization History surgeries

## 2020-08-18 NOTE — XMS REPORT
[facility]

                             Created on: 2020



Love Chery

External Reference #: [id number]

: 1985

Sex: Female



Demographics





                          Address                   [address] [place]  [address]

 

                          Preferred Language        Unknown

 

                          Marital Status            Unknown

 

                          Uats[employer] Affiliation     Unknown

 

                          Race                      Unknown

 

                          Ethnic Group              Unknown





Author





                          Author                    Love SHETH

 

                          [facility]

 

                          Address                   [address] [place]  [address]



 

                          Phone                     [phone]







Care Team Providers





                    Care Team Member Name Role                Phone

 

                    MERYL  JACKIE        Unavailable         [phone]







PROBLEMS





          Type      Condition ICD9-CM Code WHN40-QI Code Onset Dates Condition S

tatus SNOMED 

Code

 

          Problem   Seasonal allergic rhinitis due to pollen           J30.1    

           Active    [id number]

 

                          Problem                   Heart failure, unspecified H

F chronicity, unspecified heart failure type

                          I50.9                     Active       73695068

 

          Problem   Asthma              J45.909             Active    074552661







ALLERGIES

No Information



ENCOUNTERS





                Encounter       Location        Date            Diagnosis

 

                          [facility]  [address] [id number]

[address] [place] [address] 

[address]                              Acute left-sided low back pa

in without sciatica M54.5

 

                          [facility]  [address] [facility] [id number]

[address] [place] [address] 

[address]                05 Mar, 2020              Sore throat J02.9

 

                          [facility]     [address] [facility] [id number]

[address] [place] [address]

                          09 Sep, 2019               

 

                          [facility]     [address] [id number]

[address] [place] [address]

                                         

 

                          [facility]     [address] [facility] [id number]

[address] [place] [address]

                          17 May, 2019               

 

                          [facility]     [address] [id number]

[address] [place] [address]

                          14 May, 2019               

 

                          [facility]     [address] [id number]

[address] [place] [address]

                          28 Mar, 2019               

 

                          [facility]     [address] [id number]

[address] [place] [address]

                          06 Mar, 2019              Morbid obesity E66.01 and Lo

calized edema R60.0

 

                          [facility]     [address] [id number]

[address] [place] [address]

                          14 2019               

 

                          [facility]     [address] [address]

                          10 Charles, 2019               

 

                          [facility]     [address] [address]

                          10 Charles, 2019              Heart failure, unspecified H

F chronicity, unspecified heart failure

type I50.9

 

                          [facility]  [address] 

[address]                              Seasonal allergic rhinitis d

ue to pollen J30.1 and 

Asthma J45.909

 

                          [facility]  [address] 

[address]                              Seasonal allergic rhinitis d

ue to pollen J30.1

 

                          [facility] IN [place] [address] 

[address]                19 Oct, 2016              Acute upper respiratory infe

ction, unspecified J06.9

 

                          [place] [address] [address]

                          05 Oct, 2016               

 

                          [facility]  [address] 

[address]                13 May, 2016              Environmental allergies Z91.

09

 

                          [place] [address] [address]

                          24 Aug, 2015              Abscess 682.9

 

                          [place] [address] [address]

                          17 Aug, 2015              Mood disorder 296.90

 

                          [place] [address] [address]

                                        Screen for STD (sexually tra

nsmitted disease) V74.5

 

                          [place] [id number]

[address] [place] [address]

                          15 Jul, 2015               

 

                          [place] [address] [address]

                          13 2015              Depression 311

 

                          [facility]     [address] [id number]

[address] [place] [address]

                          13 2015              Major depressive disorder, r

ecurrent episode, severe 296.33 and No 

condition on Axis II V71.09

 

                          [facility]     [address] [id number]

[address] [place] [address]

                          10 2015               

 

                          [facility]     [address] [id number]

[address] [place] [address]

                          14 2015               

 

                          [facility]     [address] [id number]

[address] [place] [address]

                                         

 

                          [facility]     [address] [id number]

[address] [place] [address]

                          16 Sep, 2014               

 

                          [facility]     [address] [id number]

[address] [place] [address]

                          16 Sep, 2014               

 

                          [facility]     [address] [id number]

[address] [place] [address]

                          15 Sep, 2014               

 

                          [facility]     [address] [id number]

[address] [place] [address]

                          15 Sep, 2014               

 

                          [facility]     [address] [id number]

[address] [place] [address]

                          12 Sep, 2014               

 

                          [facility]     [address] [id number]

[address] [place] [address]

                          12 Sep, 2014               

 

                          [facility]     [address] [id number]

[address] [place] [address]

                          06 Aug, 2014               

 

                          [facility]     [address] [id number]

[address] [place] [address]

                          06 Aug, 2014               

 

                          [facility]     [address] [id number]

[address] [place] [address]

                                         

 

                          [facility]     [address] [id number]

[address] [place] [address]

                                         

 

                          [facility]     [address] [id number]

[address] [place] [address], [place] [address]

                          10 Adam, 2014               

 

                          [facility]     [address] [id number]

[address] [place] [address], [place] [address]

                          10 Adam, 2014               

 

                          [facility]     [address] [id number]

[address] [place] [address], [place] [address]

                          07 May, 2014               

 

                          [facility]     [address] [id number]

[address] [place] [address], [place] [address]

                          07 May, 2014               

 

                          [facility]     [address] [id number]

[address] [place] [address], [place] [address]

                          05 May, 2014               

 

                          [facility]     [address] [id number]

[address] [place] [address], [place] [address]

                          05 May, 2014               

 

                          [facility]     [address] [id number]

[address] [place] [address], [place] [address]

                                         

 

                          [facility]     [address] [id number]

[address] [place] [address], [place] [address]

                                         

 

                          [facility]     [address] [id number]

[address] [place] [address], [place] [address]

                          25 Mar, 2014               

 

                          [facility]     [address] [id number]

[address] [place] [address], [place] [address]

                          24 Mar, 2014               

 

                          [facility]     [address] [id number]

[address] [place] [address], [place] [address]

                          24 Mar, 2014               

 

                          [facility]     [address] [id number]

[address] [place] [address], [place] [address]

                          13 Mar, 2014               

 

                          [facility]     [address] [id number]

[address] [place] [address], [place] [address]

                          13 Mar, 2014               

 

                          [facility]     [address] [id number]

[address] [place] [address], [place] [address]

                                         

 

                          [facility]     [address] [id number]

[address] [place] [address], [place] [address]

                                         

 

                          [facility]     [address] [id number]

[address] [place] [address], [place] [address]

                                         

 

                          [facility]     [address] [id number]

[address] [place] [address], [place] [address]

                                         

 

                          [facility]     [address] [id number]

[address] [place] [address], [place] [address]

                                         

 

                          [facility]     [address] [id number]

[address] [place] [address], [place] [address]

                                         

 

                          [facility]     [address] [id number]

[address] [place] [address], [place] [address]

                                         

 

                          [facility]     [address] [id number]

[address] [place] [address], [place] [address]

                          19 Dec, 2013               

 

                          [facility]     [address] [id number]

[address] [place] [address], [place] [address]

                          19 Dec, 2013               

 

                          [facility]     [address] [id number]

[address] [place] [address], [place] [address]

                                         

 

                          [facility]     [address] [id number]

[address] [place] [address], [place] [address]

                                         

 

                          [facility]     [address] [id number]

[address] [place] [address], [place] [address]

                                         

 

                          [facility]     [address] [id number]

[address] [place] [address], [place] [address]

                                         

 

                          [facility]     [address] [id number]

[address] [place] [address], [place] [address]

                          27 Sep, 2013               

 

                          [facility]     [address] [id number]

[address] [place] [address], [place] [address]

                          05 Sep, 2013               

 

                          [facility]     [address] [id number]

[address] [place] [address], [place] [address]

                                         

 

                          [facility]     [address] [id number]

[address] [place] [address], [place] [address]

                                         

 

                          [facility]     [address] [id number]

[address] [place] [address], [place] [address]

                                         

 

                          [facility]     [address] [id number]

[address] [place] [address], [place] [address]

                                         

 

                          [facility]     [address] [id number]

[facility] [place] [address]

                          31 May, 2013               

 

                          [facility]     [address] [id number]

[address] [place] [address], [place] [address]

                          06 May, 2013               

 

                          [facility]     [address] [id number]

[address] [place] [address], [place] [address]

                                         

 

                          [facility]     [address] [id number]

[address] [place] [address], [place] [address]

                          28 Mar, 2013               

 

                          [facility]     [address] [id number]

[address] [place] [address], [place] [address]

                          18 Mar, 2013               

 

                          [facility]     [address] [id number]

[address] [place] [address], [place] [address]

                          14 Mar, 2013               

 

                          [facility]     [address] [id number]

[address] [place] [address], [place] [address]

                          13 Mar, 2013               

 

                          [facility]     [address] [id number]

[address] [place] [address], [place] [address]

                          12 Mar, 2013               

 

                          [facility]     [address] [id number]

[address] [place] [address], [place] [address]

                          11 Mar, 2013               

 

                          [facility]     [address] [id number]

[address] [place] [address], [place] [address]

                          06 Mar, 2013               

 

                          [facility]     [address] [id number]

[address] [place] [address], [place] [address]

                          06 Mar, 2013               

 

                          [facility]     [address] [id number]

[address] [place] [address], [place] [address]

                          06 Mar, 2013               

 

                          [facility]     [address] [id number]

[address] [place] [address], [place] [address]

                          06 Mar, 2013               

 

                          [facility]     [address] [id number]

[address] [place] [address], [place] [address]

                          05 Mar, 2013               

 

                          [facility]     [address] [id number]

[address] [place] [address], [place] [address]

                          06 2013               

 

                          [facility]     [address] [id number]

[address] [place] [address], [place] [address]

                                         

 

                          [facility]     [address] [id number]

[address] [place] [address], [place] [address]

                                         

 

                          [facility]     [address] [id number]

[address] [place] [address], [place] [address]

                                         

 

                          [facility]     [address] [id number]

[address] [place] [address], [place] [address]

                                         

 

                    [facility]     [address] [id number] [place] 

[place] [id number] 20 Aug, 2012

                                         

 

                          [facility]     [address] [id number]

[address] [place] [address], [place] [address]

                                         

 

                          [facility]     [address] [id number]

[address] [place] [address], [place] [address]

                                         

 

                          [facility]     [address] [id number]

[address] [place] [address], [place] [address]

                                         

 

                          [facility]     [address] [id number]

[address] [place] [address], [place] [address]

                                         

 

                          [facility]     [address] [id number]

[address] [place] [address], [place] [address]

                                         

 

                          [facility]     [address] [id number]

[address] [place] [address], [place] [address]

                                         

 

                          [facility]     [address] [id number]

[address] [place] [address], [place] [address]

                                         

 

                          [facility]     [address] [id number]

[address] [place] [address], [place] [address]

                                         

 

                          [facility]     [address] [id number]

[address] [place] [address], [place] [address]

                          26 Mar, 2012               

 

                          [facility]     [address] [id number]

[address] [place] [address], [place] [address]

                                         

 

                          [facility]     [address] [id number]

[address] [place] [address], [place] [address]

                                         

 

                          [facility]     [address] [id number]

[address] [place] [address], [place] [address]

                          12 Dec, 2011               

 

                          [facility]     [address] [id number]

[address] [place] [address], [place] [address]

                                         

 

                          [facility]     [address] [id number]

[address] [place] [address], [place] [address]

                                         

 

                          [facility]     [address] [id number]

[address] [place] [address], [place] [address]

                                         

 

                          [facility]     [address] [id number]

[address] [place] [address], [place] [address]

                                         

 

                          [facility]     [address] [id number]

[address] [place] [address], [place] [address]

                          31 Oct, 2011               

 

                          [facility]     [address] [id number]

[address] [place] [address], [place] [address]

                          17 Oct, 2011               

 

                          [facility]     [address] [id number]

[address] [place] [address], [place] [address]

                          17 Oct, 2011               

 

                          [facility]     [address] [id number]

[address] [place] [address], [place] [address]

                          10 Oct, 2011               

 

                          [facility]     [address] [id number]

[address] [place] [address], [place] [address]

                          22 Dec, 2010               

 

                          [facility]     [address] [id number]

[address] [place] [address], [place] [address]

                                         

 

                          [facility]     [address] [id number]

[address] [place] [address], [place] [address]

                                         

 

                          [facility]     [address] [id number]

[address] [place] [address], [place] [address]

                          19 May, 2010               

 

                          [facility]     [address] [id number]

[address] [place] [address], [place] [address]

                          17 May, 2010               

 

                          [facility]     [address] [id number]

[address] [place] [address], [place] [address]

                          13 May, 2010               

 

                          [facility]     [address] [id number]

[address] [place] [address]

                          11 May, 2010               

 

                          [facility]     [address] [facility] [id number]

[address] [place] [address]

                          10 May, 2010               

 

                          [facility]     [address] [facility] [id number]

[address] [place] [address]

                          11 Dec, 2009               

 

                          [facility]     [address] [facility] [id number]

[address] [place] [id number]

                          11 Dec, 2009               

 

                          [facility]     [address] [facility] [id number]

[address] [place] [id number]

                                         

 

                          [facility]     [address] [facility] [id number]

[address] [place] [id number]

                                         







IMMUNIZATIONS

No Known Immunizations



SOCIAL HISTORY

Never Assessed



REASON FOR VISIT





PLAN OF CARE





VITAL SIGNS





MEDICATIONS

No Known Medications



RESULTS

No Results



PROCEDURES

No Known procedures



INSTRUCTIONS





MEDICATIONS ADMINISTERED

No Known Medications



MEDICAL (GENERAL) HISTORY





                    Type                Description         Date

 

                    Medical History     asthma-dx at age 11-12  

 

                          Medical History           hypertension-hx of pre-eclam

psia with second pregnancy, was 

induced at 35 weeks                      

 

                    Medical History     cardiomyopathy-postpartum (non-ischemic)

 (Stephanie)  

 

                    Medical History     depression           

 

                    Medical History     CHF                  

 

                    Surgical History    tonsillectomy        

 

                    Surgical History    tubal               2014

 

                    Surgical History    tonsillectomy        

 

                          Surgical History          tubal [id number] dev

eloped respiratory issues and heart

failure following the surgery           2011

 

                    Surgical History    echo-2010, 8/19/10, , 11  

 

                          Hospitalization History   PPD #4 for non-ischemic card

iomyopathy, respiratory 

distress due to pulmonary edema, ARF    2010

 

                    Hospitalization History surgeries

## 2020-08-18 NOTE — XMS REPORT
[facility]

                             Created on: 2020



Love Chery

External Reference #: [id number]

: 1985

Sex: Female



Demographics





                          Address                   [address]

[place]  [address]

 

                          Preferred Language        Unknown

 

                          Marital Status            Unknown

 

                          Hinduism Affiliation     Unknown

 

                          Race                      Unknown

 

                          Ethnic Group              Unknown





Author





                          Author                    Love GONZALEZ

 

                          Organization              [facility]

 

                          Address                   [address] [place]  [address]



 

                          Phone                     [phone]







Care Team Providers





                    Care Team Member Name Role                Phone

 

                    LISA  PEPE Unavailable         [phone]







PROBLEMS





          Type      Condition ICD9-CM Code AIK64-KT Code Onset Dates Condition S

tatus SNOMED 

Code

 

          Problem   Seasonal allergic rhinitis due to pollen           J30.1    

           Active    71855796

 

                          Problem                   Heart failure, unspecified H

F chronicity, unspecified heart failure type

                          I50.9                     Active       05852783

 

          Problem   Asthma              J45.909             Active    [id number]







ALLERGIES

No Information



ENCOUNTERS





                Encounter       Location        Date            Diagnosis

 

                          [facility] WALK IN [facility]  [address] [facility] [id number]

[address] [place] 

[address]                05 Mar, 2020              Sore throat J02.9

 

                    [facility] [address] [address] 09 

Sep, 2019                                

 

                    [facility] [address] [address]                                 

 

                    [facility] [address] [address] 17 

May, 2019                                

 

                    [facility] [address] [address] 14 

May, 2019                                

 

                    [facility] [address] [address] 28 

Mar, 2019                                

 

                    [facility] [address] [address] 06 

Mar, 2019                               Morbid obesity E66.01 and Localized gilmar

a R60.0

 

                    [facility] [address] [address]                                 

 

                    [facility] [address] [address] 10 

Charles, 2019                                

 

                    [place] [address] [address] 10 

Charles, 2019                               Heart failure, unspecified HF chronicity

, unspecified heart failure 

type I50.9

 

                          [facility]  [id number]

[address] [place] [address] 

[address]                              Seasonal allergic rhinitis d

ue to pollen J30.1 and 

Asthma J45.909

 

                          [facility]  [address] 

[address]                              Seasonal allergic rhinitis d

ue to pollen J30.1

 

                          [facility] WALK IN [address] 

[address]                19 Oct, 2016              Acute upper respiratory infe

ction, unspecified J06.9

 

                    [place] [address] [address] 05 

Oct, 2016                                

 

                          [facility] WALK IN [address] 

[address]                13 May, 2016              Environmental allergies Z91.

09

 

                    [place] [address] [address] 24 

Aug, 2015                               Abscess 682.9

 

                    [place] [address] [address] 17 

Aug, 2015                               Mood disorder 296.90

 

                    [place] [address] [address]                                Screen for STD (sexually transmitted dis

ease) V74.5

 

                    [place] [address] [address] 15 

Jul, 2015                                

 

                    [place] [address] [address]                                Depression 311

 

                    [place] [address] [address]                                Major depressive disorder, recurrent epi

sode, severe 296.33 and No 

condition on Axis II V71.09

 

                    [place] [address] [id number] [place] [address] 10 

Jul,                                 

 

                    [facility] [address] [facility] [id number] [place] [address] 14 

2015                                

 

                    [facility] [address] [id number] [place] [address] 13 

2015                                

 

                    [facility] [address] [facility] [id number] [place] [address] 16 

Sep, 2014                                

 

                    [facility] [address] [facility] [id number] [place] [address] 16 

Sep, 2014                                

 

                    [facility] [address] [facility] [id number] [place] [address] 15 

Sep, 2014                                

 

                    [facility] [address] [facility] [id number] [place] [address] 15 

Sep, 2014                                

 

                    [facility] [address] [id number] [place] [address] 12 

Sep, 2014                                

 

                    [facility] [address] [id number] [place] [address] 12 

Sep, 2014                                

 

                    [facility] [address] [facility] [id number] [place] [address] 06 

Aug, 2014                                

 

                    [facility] [address] [id number] [place] [address] 06 

Aug, 2014                                

 

                    [facility] [address] [facility] [id number] [place] [address]                                 

 

                    [facility] [address] [id number] [place] [address]                                 

 

                    [facility] [address] [facility] [id number] [place] [address] 10 

Adam, 2014                                

 

                    [facility] [address] [id number] [place] [address] 10 

Adam, 2014                                

 

                    [facility] [address] [facility] [id number] [place] [address] 07 

May, 2014                                

 

                    [facility] [address] [id number] [place] [address] 07 

May, 2014                                

 

                    [facility] [address] [facility] [id number] [place] [address] 05 

May, 2014                                

 

                    [facility] [address] [id number] [place] [address] 05 

May, 2014                                

 

                    [facility] [address] [id number] [place] [address]                                 

 

                    [facility] [address] [id number] [place] [address]                                 

 

                    [facility] [address] [id number] [place] [address] 25 

Mar, 2014                                

 

                    [facility] [address] [facility] [id number] [place] [address] 24 

Mar, 2014                                

 

                    [facility] [address] [facility] [id number] [place] [address] 24 

Mar, 2014                                

 

                    [facility] [address] [id number] [place] [address] 13 

Mar, 2014                                

 

                    [facility] [address] [id number] [place] [address] 13 

Mar, 2014                                

 

                    [facility] [address] [id number] [place] [address]                                 

 

                    [facility] [address] [id number] [place] [address]                                 

 

                    [facility] [address] [id number] [place] [address]                                 

 

                    [facility] [address] [id number] [place] [address]                                 

 

                    [facility] [address] [id number] [place] [address]                                 

 

                    [facility] [address] [id number] [place] [address]                                 

 

                    [facility] [address] [id number] [place] [address]                                 

 

                    [facility] [address] [id number] [place] [address] 19 

Dec, 2013                                

 

                    [facility] [address] [id number] [place] [address] 19 

Dec, 2013                                

 

                    [facility] [address] [id number] [place] [address]                                 

 

                    [facility] [address] [id number] [place] [address]                                 

 

                    [facility] [address] [id number] [place] [address]                                 

 

                    [facility] [address] [id number] [place] [address]                                 

 

                    [facility] [address] [facility] [id number] [place] [address] 27 

Sep, 2013                                

 

                    [facility] [address] [id number] [place] [address] 05 

Sep, 2013                                

 

                    [facility] [address] [id number] [place] [address]                                 

 

                    [facility] [address] [id number] [place] [address]                                 

 

                    [facility] [address] [id number] [place] [address]                                 

 

                    [facility] [address] [id number] [place] [address]                                 

 

                    [facility] [address] [id number] [place] [address] 31 

May, 2013                                

 

                    [facility] [address] [id number] [place] [address] 06 

May, 2013                                

 

                    [facility] [address] [id number] [place] [address]                                 

 

                    [facility] [address] [id number] [place] [address] 28 

Mar, 2013                                

 

                    [facility] [address] [id number] [place] [address] 18 

Mar, 2013                                

 

                    [facility] [address] [facility] [id number] [place] [address] 14 

Mar, 2013                                

 

                    [facility] [address] [id number] [place] [address] 13 

Mar, 2013                                

 

                    [facility] [address] [id number] [place] [address] 12 

Mar, 2013                                

 

                    [facility] [address] [facility] [id number] [place] [address] 11 

Mar, 2013                                

 

                    [facility] [address] [id number] [place] [address] 06 

Mar, 2013                                

 

                    [facility] [address] [id number] [place] [address] 06 

Mar, 2013                                

 

                    [facility] [address] [id number] [place] [address] 06 

Mar, 2013                                

 

                    [facility] [address] [id number] [place] [address] 06 

Mar, 2013                                

 

                    [facility] [address] [id number] [place] [address] 05 

Mar, 2013                                

 

                    [facility] [address] [id number] [place] [address]                                 

 

                    [facility] [address] [id number] [place] [address]                                 

 

                    [facility] [address] [facility] [id number] [place] [address]                                 

 

                    [facility] [address] [id number] [place] [address]                                 

 

                    [facility] [address] [id number] [place] [address]                                 

 

                [facility] [id number] [place] [address]

 Aug, 2012     

 

                    [facility] [address] [facility] [id number] [place] [address]                                 

 

                    [facility] [address] [id number] [place] [address]                                 

 

                    [facility] [address] [id number] [place] [address]                                 

 

                    [facility] [address] [id number] [place] [address]                                 

 

                    [facility] [address] [facility] [id number] [place] [address]                                 

 

                    [facility] [address] [id number] [place] [address]                                 

 

                    [facility] [address] [id number] [place] [address]                                 

 

                    [facility] [address] [id number] [place] [address]                                 

 

                    [facility] [address] [id number] [place] [address] 26 

Mar, 2012                                

 

                    [facility] [id number] [place] [address]                                 

 

                    [facility] [id number] [place] [address]                                 

 

                    [facility] [address] [id number] [place] [address] 12 

Dec, 2011                                

 

                    [facility] [address] [id number] [place] [address]                                 

 

                    [facility] [id number] [place] [address]                                 

 

                    [facility] [address] [facility] [id number] [place] [address] 16 

2011                                

 

                    [facility] [address] [id number] [place] [address]                                 

 

                    [facility] [id number] [place] [address] 31 

Oct, 2011                                

 

                    [facility] [address] [id number] [place] [address] 17 

Oct, 2011                                

 

                    [facility] [address] [id number] [place] [address] 17 

Oct, 2011                                

 

                    [facility] [address] [id number] [place] [address] 10 

Oct, 2011                                

 

                    [facility] [address] [id number] [place] [address] 22 

Dec, 2010                                

 

                    [facility] [address] [id number] [place] [address]                                 

 

                    [facility] [id number] [place] [address]                                 

 

                    [facility] [id number] [place] [address] 19 

May, 2010                                

 

                    [facility] [id number] [place] [address] 17 

May, 2010                                

 

                    [facility] [id number] [place] [address] 13 

May, 2010                                

 

                    [facility] [id number] [place] [address] 11 

May, 2010                                

 

                    [facility] [address] [facility] [id number] [place] [address] 10 

May, 2010                                

 

                    [facility] [id number] [place] [address] 11 

Dec, 2009                                

 

                    [facility] [id number] [place] [address] 11 

Dec, 2009                                

 

                    [facility] [id number] [place] [address]                                 

 

                    [facility] [id number] [place] [address]                                 







IMMUNIZATIONS

No Known Immunizations



SOCIAL HISTORY

Never Assessed



REASON FOR VISIT





PLAN OF CARE





VITAL SIGNS





                    Height              66 in               2014

 

                    Weight              279 lbs             2014

 

                    Temperature         97 degrees Fahrenheit 2014

 

                    Heart Rate          72 bpm              2014

 

                    Respiratory Rate    18                  2014

 

                    Blood pressure systolic 92 mmHg             2014

 

                    Blood pressure diastolic 78 mmHg             2014







MEDICATIONS

No Known Medications



RESULTS

No Results



PROCEDURES

No Known procedures



INSTRUCTIONS





MEDICATIONS ADMINISTERED

No Known Medications



MEDICAL (GENERAL) HISTORY





                    Type                Description         Date

 

                    Medical History     asthma-dx at age 11-12  

 

                          Medical History           hypertension-hx of pre-eclam

psia with second pregnancy, was 

induced at 35 weeks                      

 

                    Medical History     cardiomyopathy-postpartum (non-ischemic)

 (Stephanie)  

 

                    Medical History     depression           

 

                    Medical History     CHF                  

 

                    Surgical History    tonsillectomy        

 

                    Surgical History    tubal               

 

                    Surgical History    tonsillectomy        

 

                          Surgical History          tubal [id number] dev

eloped respiratory issues and heart

failure following the surgery           2011

 

                    Surgical History    echo-2010, 8/19/10, , 11  

 

                          Hospitalization History   PPD #4 for non-ischemic card

iomyopathy, respiratory 

distress due to pulmonary edema, ARF    2010

 

                    Hospitalization History surgeries

## 2020-08-18 NOTE — XMS REPORT
[facility]

                             Created on: 2020



Love Chery

External Reference #: [id number]

: 1985

Sex: Female



Demographics





                          Address                   [address] [place]  [address]

 

                          Preferred Language        Unknown

 

                          Marital Status            Unknown

 

                          Baptism Affiliation     Unknown

 

                          Race                      Unknown

 

                          Ethnic Group              Unknown





Author





                          Author                    Love Short

 

                          Organization              [facility] [place]

 

                          Address                   [address] [place]  [address]



 

                          Phone                     [phone]







Care Team Providers





                    Care Team Member Name Role                Phone

 

                    RANGEL Short    Unavailable         [phone]







PROBLEMS





          Type      Condition ICD9-CM Code YKE35-TZ Code Onset Dates Condition S

tatus SNOMED 

Code

 

          Problem   Seasonal allergic rhinitis due to pollen           J30.1    

           Active    [id number]

 

                          Problem                   Heart failure, unspecified H

F chronicity, unspecified heart failure type

                          I50.9                     Active       [id number]

 

          Problem   Asthma              J45.909             Active    [id number]







ALLERGIES

No Information



ENCOUNTERS





                Encounter       Location        Date            Diagnosis

 

                          [facility] [place]     [address] [facility] [id number]

[address] [place] [address]

                          10 Jul, 2020               

 

                          [facility]  [address] [facility] [id number]

[address] [place] [address] 

[address]                09 May, 2020              Dental abscess K04.7

 

                          [facility]  [address] [facility] [id number]

[address] [place] [address] 

[address]                              Acute left-sided low back pa

in without sciatica M54.5

 

                          [facility]  [address] [facility] [id number]

[address] [place] [address] 

[address]                05 Mar, 2020              Sore throat J02.9

 

                          [facility] [place]     [address] [facility] [id number]

[address] [place] [address]

                          09 Sep, 2019               

 

                          [facility] [place]     [address] [facility] [id number]

[address] [place] [address]

                                         

 

                          [facility] [place]     [address] [facility] [id number]

[address] [place] [address]

                          17 May, 2019               

 

                          [facility] [place]     [address] [facility] [id number]

[address] [place] [address]

                          14 May, 2019               

 

                          [facility] [place]     [address] [id number]

[address] [place] [address]

                          28 Mar, 2019               

 

                          [facility] [place]     [address] [address]

                          06 Mar, 2019              Morbid obesity E66.01 and Lo

calized edema R60.0

 

                          [facility] [place]     [address] [address]

                                         

 

                          [facility] [place]     [address] [address]

                          10 Charles, 2019               

 

                          [facility] [place]     [address] [address]

                          10 Charles, 2019              Heart failure, unspecified H

F chronicity, unspecified heart failure

type I50.9

 

                          [facility]  [address] 

[address]                              Seasonal allergic rhinitis d

ue to pollen J30.1 and 

Asthma J45.909

 

                          [facility]  [address] 

[address]                              Seasonal allergic rhinitis d

ue to pollen J30.1

 

                          [facility]  [address] 

[address]                19 Oct, 2016              Acute upper respiratory infe

ction, unspecified J06.9

 

                          [facility] [place]     [address] [address]

                          05 Oct, 2016               

 

                          [facility]  [address] 

[address]                13 May, 2016              Environmental allergies Z91.

09

 

                          [place] [address] [address]

                          24 Aug, 2015              Abscess 682.9

 

                          [facility] [place]     [address] [address]

                          17 Aug, 2015              Mood disorder 296.90

 

                          [facility] [place]     [address] [address]

                                        Screen for STD (sexually tra

nsmitted disease) V74.5

 

                          [facility] [place]     [address] [id number]

[address] [place] [address]

                          15 2015               

 

                          [facility] [place]     [address] [id number]

[address] [place] [address]

                          13 2015              Depression 311

 

                          [facility] [place]     [address] [id number]

[address] [place] [address]

                          13 2015              Major depressive disorder, r

ecurrent episode, severe 296.33 and No 

condition on Axis II V71.09

 

                          [facility] [place]     [address] [id number]

[address] [place] [address]

                          10 2015               

 

                          [facility] [place]     [address] [id number]

[address] [place] [address]

                          14 2015               

 

                          [facility] [place]     [address] [id number]

[address] [place] [address]

                                         

 

                          [facility] [place]     [address] [id number]

[address] [place] [address]

                          16 Sep, 2014               

 

                          [facility] [place]     [address] [id number]

[address] [place] [address]

                          16 Sep, 2014               

 

                          [facility] [place]     [address] [id number]

[address] [place] [address]

                          15 Sep, 2014               

 

                          [facility] [place]     [address] [id number]

[address] [place] [address]

                          15 Sep, 2014               

 

                          [facility] [place]     [address] [id number]

[address] [place] [address]

                          12 Sep, 2014               

 

                          [facility] [place]     [address] [id number]

[address] [place] [address]

                          12 Sep, 2014               

 

                          [facility] [place]     [address] [id number]

[address] [place] [address]

                          06 Aug, 2014               

 

                          [facility] [place]     [address] [id number]

[address] [place] [address]

                          06 Aug, 2014               

 

                          [facility]     [address] [id number]

[facility] [address]

                                         

 

                          [facility]     [address] [id number]

[address] [place] [address], [place] [address]

                                         

 

                          [facility]     [address] [id number]

[address] [place] [address], [place] [address]

                          10 Adam, 2014               

 

                          [facility]     [address] [id number]

[address] [place] [address], [place] [address]

                          10 Adam, 2014               

 

                          [facility]     [address] [id number]

[address] [place] [address], [place] [address]

                          07 May, 2014               

 

                          [facility]     [address] [id number]

[address] [place] [address], [place] [address]

                          07 May, 2014               

 

                          [facility]     [address] [id number]

[address] [place] [address], [place] [address]

                          05 May, 2014               

 

                          [facility]     [address] [id number]

[address] [place] [address], [place] [address]

                          05 May, 2014               

 

                          [facility]     [address] [id number]

[address] [place] [address], [place] [address]

                                         

 

                          [facility]     [address] [id number]

[address] [place] [address], [place] [address]

                                         

 

                          [facility]     [address] [id number]

[address] [place] [address], [place] [address]

                          25 Mar, 2014               

 

                          [facility]     [address] [id number]

[address] [place] [address], [place] [address]

                          24 Mar, 2014               

 

                          [facility]     [address] [id number]

[address] [place] [address], [place] [address]

                          24 Mar, 2014               

 

                          [facility]     [address] [id number]

[address] [place] [address], [place] [address]

                          13 Mar, 2014               

 

                          [facility]     [address] [id number]

[address] [place] [address], [place] [address]

                          13 Mar, 2014               

 

                          [facility]     [address] [id number]

[address] [address]

                                         

 

                          [facility]     [address] [id number]

[address] [place] [address], [place] [address]

                                         

 

                          [facility]     [address] [id number]

[address] [place] [address], [place] [address]

                                         

 

                          [facility]     [address] [id number]

[address] [place] [address], [place] [address]

                                         

 

                          [facility]     [address] [id number]

[address] [place] [address], [place] [address]

                                         

 

                          [facility]     [address] [id number]

[address] [place] [address], [place] [address]

                                         

 

                          [facility]     [address] [id number]

[address] [place] [address], [place] [address]

                                         

 

                          [facility]     [address] [id number]

[address] [place] [address], [place] [address]

                          19 Dec, 2013               

 

                          [facility]     [address] [id number]

[address] [place] [address], [place] [address]

                          19 Dec, 2013               

 

                          [facility]     [address] [id number]

[address] [place] [address], [place] [address]

                                         

 

                          [facility]     [address] [id number]

[address] [place] [address], [place] [address]

                                         

 

                          [facility]     [address] [id number]

[address] [place] [address], [place] [address]

                                         

 

                          [facility]     [address] [id number]

[address] [place] [address]

                                         

 

                          [facility]     [address] [id number]

[address] [place] [address], [place] [address]

                          27 Sep, 2013               

 

                          [facility]     [address] [id number]

[address] [place] [address], [place] [address]

                          05 Sep, 2013               

 

                          [facility]     [address] [id number]

[address] [place] [address], [place] [address]

                                         

 

                          [facility]     [address] [id number]

[facility] [address]

                                         

 

                          [facility]     [address] [id number]

[address] [place] [address], [place] [address]

                                         

 

                          [facility]     [address] [id number]

[address] [place] [address], [place] [address]

                                         

 

                          [facility]     [address] [id number]

[address] [place] [address], [place] [address]

                          31 May, 2013               

 

                          [facility]     [address] [id number]

[address] [place] [address], [place] [address]

                          06 May, 2013               

 

                          [facility]     [address] [id number]

[address] [place] [address], [place] [address]

                                         

 

                          [facility]     [address] [id number]

[address] [place] [address], [place] [address]

                          28 Mar, 2013               

 

                          [facility]     [address] [id number]

[address] [place] [address], [place] [address]

                          18 Mar, 2013               

 

                          [facility]     [address] [id number]

[address] [place] [address], [place] [address]

                          14 Mar, 2013               

 

                          [facility]     [address] [id number]

[address] [place] [address], [place] [address]

                          13 Mar, 2013               

 

                          [facility]     [address] [id number]

[address] [place] [address], [place] [address]

                          12 Mar, 2013               

 

                          [facility]     [address] [id number]

[address] [place] [address], [place] [address]

                          11 Mar, 2013               

 

                          [facility]     [address] [id number]

[address] [place] [address], [place] [address]

                          06 Mar, 2013               

 

                          [facility]     [address] [id number]

[address] [place] [address], [place] [address]

                          06 Mar, 2013               

 

                          [facility]     [address] [id number]

[address] [place] [address], [place] [address]

                          06 Mar, 2013               

 

                          [facility]     [address] [id number]

[address] [place] [address], [place] [address]

                          06 Mar, 2013               

 

                          [facility]     [address] [id number]

[address] [place] [address], [place] [address]

                          05 Mar, 2013               

 

                          [facility]     [address] [id number]

[address] [place] [address], [place] [address]

                                         

 

                          [facility]     [address] [id number]

[address] [place] [address], [place] [address]

                                         

 

                          [facility]     [address] [id number]

[address] [place] [address], [place] [address]

                                         

 

                          [facility]     [address] [id number]

[address] [place] [address], [place] [address]

                                         

 

                          [facility]     [address] [id number]

[address] [place] [address], [place] [address]

                                         

 

                    [facility] [place] [address] [id number] [place]

[place] [id number] 20 Aug, 2012

                                         

 

                          [facility]     [address] [id number]

[address] [place] [address], [place] [address]

                                         

 

                          [facility]     [address] [id number]

[address] [place] [address], [place] [address]

                                         

 

                          [facility]     [address] [id number]

[address] [place] [address], [place] [address]

                                         

 

                          [facility]     [address] [id number]

[address] [place] [address], [place] [address]

                                         

 

                          [facility]     [address] [id number]

[address] [place] [address], [place] [address]

                                         

 

                          [facility]     [address] [id number]

[address] [place] [address], [place] [address]

                                         

 

                          [facility]     [address] [id number]

[address] [place] [address], [place] [address]

                                         

 

                          [facility]     [address] [id number]

[address] [place] [address], [place] [address]

                                         

 

                          [facility]     [address] [id number]

[address] [place] [address], [place] [address]

                          26 Mar, 2012               

 

                          [facility]     [address] [id number]

[address] [place] [address], [place] [address]

                                         

 

                          [facility]     [address] [id number]

[address] [place] [address], [place] [address]

                                         

 

                          [facility]     [address] [id number]

[address] [place] [address], [place] [address]

                          12 Dec, 2011               

 

                          [facility]     [address] [id number]

[address] [place] [address], [place] [address]

                                         

 

                          [facility]     [address] [id number]

[address] [place] [address], [place] [address]

                                         

 

                          [facility]     [address] [id number]

[address] [place] [address], [place] [address]

                                         

 

                          [facility]     [address] [id number]

[address] [place] [address], [place] [address]

                                         

 

                          [facility]     [address] [id number]

[address] [place] [address], [place] [address]

                          31 Oct, 2011               

 

                          [facility]     [address] [id number]

[address] [place] [address], [place] [address]

                          17 Oct, 2011               

 

                          [facility]     [address] [id number]

[address] [place] [address], [place] [address]

                          17 Oct, 2011               

 

                          [facility]     [address] [id number]

[address] [place] [address], [place] [address]

                          10 Oct, 2011               

 

                          [facility]     [address] [id number]

[address] [place] [address], [place] [address]

                          22 Dec, 2010               

 

                          [facility]     [address] [id number]

[address] [place] [address], [place] [address]

                                         

 

                          [facility]     [address] [id number]

[address] [place] [address], [place] [address]

                                         

 

                          [facility] [facility]     [address] [id number]

[address] [place] [address], [place] [address]

                          19 May, 2010               

 

                          [facility]     [address] [id number]

[address] [place] [phone]

                          17 May, 2010               

 

                          [facility] [place]     [address] [id number]

[address] [place] [id number]

                          13 May, 2010               

 

                          [facility] [place]     [address] [id number]

[address] [place] [phone]

                          11 May, 2010               

 

                          [facility] [place]     [address] [facility] [id number]

[address] [place] [phone]

                          10 May, 2010               

 

                          [facility] [place]     [address] [id number]

[address] [place] [phone]

                          11 Dec, 2009               

 

                          [facility] [place]     [address] [facility] [id number]

[address] [place] [id number]

                          11 Dec, 2009               

 

                          [facility] [place]     [address] [facility] [id number]

[address] [place] [phone]

                                         

 

                          [facility] [place]     [address] [facility] [id number]

[address] [place] [phone]

                                         







IMMUNIZATIONS

No Known Immunizations



SOCIAL HISTORY

Never Assessed



REASON FOR VISIT





PLAN OF CARE





VITAL SIGNS





MEDICATIONS

Unknown Medications



RESULTS

No Results



PROCEDURES

No Known procedures



INSTRUCTIONS





MEDICATIONS ADMINISTERED

No Known Medications



MEDICAL (GENERAL) HISTORY





                    Type                Description         Date

 

                    Medical History     asthma-dx at age 11-12  

 

                          Medical History           hypertension-hx of pre-eclam

psia with second pregnancy, was 

induced at 35 weeks                      

 

                    Medical History     cardiomyopathy-postpartum (non-ischemic)

 (Stephanie)  

 

                    Medical History     depression           

 

                    Medical History     CHF                  

 

                    Surgical History    tonsillectomy        

 

                    Surgical History    tubal               2014

 

                    Surgical History    tonsillectomy        

 

                          Surgical History          tubal foomlzks-Rqssva-bk dev

eloped respiratory issues and heart

failure following the surgery           2011

 

                    Surgical History    echo-2010, 8/19/10, , 11  

 

                          Hospitalization History   PPD #4 for non-ischemic card

iomyopathy, respiratory 

distress due to pulmonary edema, ARF    2010

 

                    Hospitalization History surgeries

## 2020-08-18 NOTE — XMS REPORT
Patient Summarization Differential

                             Created on: 2020



TOREY REESEKWAN CHOWDHURY

External Reference #: [id number]

: 1985

Sex: Female



Demographics





                          Address                   [address] 

[place]  [address]

 

                          Home Phone                [phone]

 

                          Preferred Language        English

 

                          Marital Status            Unknown

 

                          Rastafari Affiliation     Unknown

 

                          Race                      Unknown

 

                          Ethnic Group              Unknown





Author





                          Author                    [id number]  [facility]

 

                          [facility]              [facility]

 

                          Address                   [address]  [address]



 

                          Phone                     [phone]







Care Team Providers





                    Care Team Member Name Role                Phone

 

                    [facility]         [phone]



                                            



Allergies

          No Information                                                        
 



Encounters

          No Information                                                        
 



Medical Equipment

          No Information                                                        
 



Goals

          No Information                                                        
 



Immunizations

                      The data below is from unstructured sourcesNo immunization
records.                                                                    



Interventions

          No Information                                                        
 



Medications

          No Information                                                        
 



Payers

          No Information                                                        
 



Plan of Treatment

                      The data below is from unstructured sources            



                          Discharge Date                   12/19/15 11:20am     

           

 

                          Disposition                   01 HOME, SELF-CARE      

          

 

                          Condition at Discharge                   Improved     

           

 

                          Instructions/Education Provided                   Acut

e Bronchitis (ED)         

      

 

                          Prescriptions                   See Medication Section

                

 

                          Referrals                   [facility] 

Physician                

 

                          Additional Instructions/Education                   Al

l discharge instructions 

reviewed with patient and/or family. Voiced                     

understanding.                     

                    

Take medications as directed. Follow-up with your DrGonzalo in a few days for         
           

recheck. Return for worse pain, fever, vomiting, weakness, breathing problems   
                 

or other concerns as needed. You may take Tylenol, 1000 mg every 8 hours as     
               

needed for fever or pain. You may take ibuprofen, 800 mg every 8 hours as       
             

needed for fever or pain.                                  



                                                                    



Problems

          No Information                                                        
 



Procedures

                      The data below is from unstructured sourcesNo known 
history of procedures.                                                          
         



Results

                      The data below is from unstructured sourcesNo known 
relevant diagnostic tests, laboratory data and/or discharge summary.            
                                                       



Social History

          No Information                                                        
 



Vital Signs

                      The data below is from unstructured sources            



                    Vital                   Response                   Date/Time

                

 

                          Temperature (Fahrenheit)                   97.5 degree

s F (97.6 - 99.5)         

                                        2015 9:30am                

 

                          Temperature (Calculated Celsius)                   36.

31421 degrees C (36.4 - 

37.5)                                   2015 9:30am                

 

                    Pulse Rate (adult)                   70 bpm (60 - 90)       

            

2015 9:30am                

 

                    Respiratory Rate                   16 bpm (12 - 24)         

          2015

9:30am                

 

                    O2 Sat by Pulse Oximetry                   98 % (88 - 100)  

                 

2015 9:30am                

 

                    Blood Pressure                   118/70 mm Hg               

    2015 

9:30am                

 

                     Blood Pressure Mean                   86 mm Hg             

      2015 

9:30am                

 

                    Pain                                                       

 

                     Pain Intensity                   0                   2015 9:30am         

      

 

                    Height (Feet)                   5 feet                    9:30am      

         

 

                    Height (Inches)                   6 inches                  

 2015 9:30am  

             

 

                          Height (Calculated Centimeters)                   167.

183683 cm                 

                                        2015 9:30am                

 

                    Weight (Pounds)                   180 pounds                

   2015 9:30am

               

 

                          Weight (Calculated Kilograms)                   81.646

627 kilograms             

                                        2015 9:30am                

 

                    Calculated BMI                   29.05                    9:30am      

         



                                                                    



Functional Status

                      The data below is from unstructured sourcesNo functional 
status results.                                                                 
  



Mental Status

          No Information                                                        
 



Advance Directives

                      



                    Directive                   Response                   Recor

ded Date/Time       

        

 

                    Advance Directives                   No                   12

/19/15 9:35am       

        

 

                    Health Care Power of                    No          

         12/19/15 

9:35am                

 

                    Organ Donor                   Yes                   12/19/15

 9:35am             

  

 

                    Resuscitation Status                   Full Code            

       12/19/15 

9:35am                



                                                                    



Discharge Instructions

          No hospital discharge instructions.                                   
            



Additional Source Comments

          This clinical document has been generated using [employer] 
software that has been certified by the Office of the National Coordinator for 
Health Information Technology (ONC 15.99.04.3023.Diam.31.00.0.867273) and the 
National Committee for  (NCQA, as an eMeasure certified 
technology).            

            

FOR RECORDS PERTAINING TO PATIENTS WHO ARE OR HAVE BEEN ENROLLED IN A CHEMICAL D
EPENDENCY/SUBSTANCE ABUSE PROGRAM, SOME INFORMATION MAY BE OMITTED. This clinica
l summary was aggregated from multiple sources.  Caution should be exercised in 
using it in the provision of clinical care. This summary normalizes information 
from multiple sources, and as a consequence, information in this document may ma
terially change the coding, format and clinical context of patient data. In olga
tion, data may be omitted in some cases. CLINICAL DECISIONS SHOULD BE BASED ON T
HE PRIMARY CLINICAL RECORDS. [employer] provides no warranty or guara
ntee of the accuracy or completeness of information in this document.The followi
ng information is based on time limited clinical information

## 2020-08-18 NOTE — XMS REPORT
[facility]

                             Created on: 2020



Love Chery

External Reference #: [id number]

: 1985

Sex: Female



Demographics





                          Address                   [address] [place]  [address]

 

                          Preferred Language        Unknown

 

                          Marital Status            Unknown

 

                          Church Affiliation     Unknown

 

                          Race                      Unknown

 

                          Ethnic Group              Unknown





Author





                          Author                    Love SHETH

 

                          [facility]

 

                          Address                   [address] [place]  [address]



 

                          Phone                     [phone]







Care Team Providers





                    Care Team Member Name Role                Phone

 

                    MERYL  JACKIE        Unavailable         [phone]







PROBLEMS





          Type      Condition ICD9-CM Code ZMK73-JS Code Onset Dates Condition S

tatus SNOMED 

Code

 

          Problem   Seasonal allergic rhinitis due to pollen           J30.1    

           Active    [id number]

 

                          Problem                   Heart failure, unspecified H

F chronicity, unspecified heart failure type

                          I50.9                     Active       09158033

 

          Problem   Asthma              J45.909             Active    [id number]







ALLERGIES

No Information



ENCOUNTERS





                Encounter       Location        Date            Diagnosis

 

                          [facility]     [address] [id number]

[address] [place] [address]

                          10 Jul, 2020               

 

                          [facility]  [address] [id number]

[address] [place] [address] 

[address]                09 May, 2020              Dental abscess K04.7

 

                          [facility]  [address] [id number]

[address] [place] [address] 

[address]                              Acute left-sided low back pa

in without sciatica M54.5

 

                          [facility]  [address] [id number]

[address] [place] [address] 

[address]                05 Mar, 2020              Sore throat J02.9

 

                          [facility]     [address] [id number]

[address] [place] [address]

                          09 Sep, 2019               

 

                          [facility]     [address] [id number]

[address] [place] [address]

                                         

 

                          [facility]     [address] [id number]

[address] [place] [address]

                          17 May, 2019               

 

                          [facility]     [address] [id number]

[address] [place] [address]

                          14 May, 2019               

 

                          [facility]     [address] [address]

                          28 Mar, 2019               

 

                          [facility]     [address] [address]

                          06 Mar, 2019              Morbid obesity E66.01 and Lo

calized edema R60.0

 

                          [facility]     [address] [address]

                                         

 

                          [facility]     [address] [address]

                          10 Charles, 2019               

 

                          [facility]     [address] [address]

                          10 Charles, 2019              Heart failure, unspecified H

F chronicity, unspecified heart failure

type I50.9

 

                          [facility]  [address] 

[address]                              Seasonal allergic rhinitis d

ue to pollen J30.1 and 

Asthma J45.909

 

                          [facility]  [address] 

[address]                              Seasonal allergic rhinitis d

ue to pollen J30.1

 

                          [facility]  [address] 

[address]                19 Oct, 2016              Acute upper respiratory infe

ction, unspecified J06.9

 

                          [facility]     [address] [address]

                          05 Oct, 2016               

 

                          [facility]  [address] 

[address]                13 May, 2016              Environmental allergies Z91.

09

 

                          [place] [address] [address]

                          24 Aug, 2015              Abscess 682.9

 

                          [facility]     [address] [address]

                          17 Aug, 2015              Mood disorder 296.90

 

                          [facility]     [address] [address]

                                        Screen for STD (sexually tra

nsmitted disease) V74.5

 

                          [facility]     [address] [id number]

[address] [place] [address]

                          15 2015               

 

                          [facility]     [address] [id number]

[address] [place] [address]

                          13 2015              Depression 311

 

                          [facility]     [address] [id number]

[address] [place] [address]

                                        Major depressive disorder, r

ecurrent episode, severe 296.33 and No 

condition on Axis II V71.09

 

                          [facility]     [address] [id number]

[address] [place] [address]

                          10 2015               

 

                          [facility]     [address] [id number]

[address] [place] [address]

                          14 2015               

 

                          [facility]     [address] [id number]

[address] [place] [address]

                                         

 

                          [facility]     [address] [id number]

[address] [place] [address]

                          16 Sep, 2014               

 

                          [facility]     [address] [id number]

[address] [place] [address]

                          16 Sep, 2014               

 

                          [facility]     [address] [id number]

[address] [place] [address]

                          15 Sep, 2014               

 

                          [facility]     [address] [id number]

[address] [place] [address]

                          15 Sep, 2014               

 

                          [facility]     [address] [id number]

[address] [place] [address]

                          12 Sep, 2014               

 

                          [facility]     [address] [id number]

[address] [place] [address]

                          12 Sep, 2014               

 

                          [facility]     [address] [id number]

[address] [place] [address]

                          06 Aug, 2014               

 

                          [facility]     [address] [id number]

[address] [place] [address]

                          06 Aug, 2014               

 

                          [facility]     [address] [id number]

[facility] [place] [address]

                                         

 

                          [facility]     [address] [id number]

[facility] [place] [address]

                                         

 

                          [facility]     [address] [id number]

[facility] [place] [address]

                          10 Adam, 2014               

 

                          [facility]     [address] [id number]

[address] [place] [address], [place] [address]

                          10 Adam, 2014               

 

                          [facility]     [address] [id number]

[facility] [place] [address]

                          07 May, 2014               

 

                          [facility]     [address] [id number]

[address] [place] [address], [place] [address]

                          07 May, 2014               

 

                          [facility]     [address] [id number]

[address] [place] [address], [place] [address]

                          05 May, 2014               

 

                          [facility]     [address] [id number]

[address] [place] [address], [place] [address]

                          05 May, 2014               

 

                          [facility]     [address] [id number]

[address] [place] [address], [place] [address]

                                         

 

                          [facility]     [address] [id number]

[address] [place] [address], [place] [address]

                                         

 

                          [facility]     [address] [id number]

[address] [place] [address], [place] [address]

                          25 Mar, 2014               

 

                          [facility]     [address] [id number]

[address] [place] [address], [place] [address]

                          24 Mar, 2014               

 

                          [facility]     [address] [id number]

[address] [place] [address], [place] [address]

                          24 Mar, 2014               

 

                          [facility]     [address] [id number]

[address] [place] [address], [place] [address]

                          13 Mar, 2014               

 

                          [facility]     [address] [id number]

[address] [place] [address], [place] [address]

                          13 Mar, 2014               

 

                          [facility]     [address] [id number]

[address] [place] [address], [place] [address]

                                         

 

                          [facility]     [address] [id number]

[address] [place] [address], [place] [address]

                                         

 

                          [facility]     [address] [id number]

[address] [place] [address], [place] [address]

                                         

 

                          [facility]     [address] [id number]

[address] [place] [address], [place] [address]

                                         

 

                          [facility]     [address] [id number]

[address] [place] [address], [place] [address]

                                         

 

                          [facility]     [address] [id number]

[address] [place] [address], [place] [address]

                                         

 

                          [facility]     [address] [id number]

[address] [place] [address], [place] [address]

                                         

 

                          [facility]     [address] [id number]

[address] [place] [address], [place] [address]

                          19 Dec, 2013               

 

                          [facility]     [address] [id number]

[address] [place] [address], [place] [address]

                          19 Dec, 2013               

 

                          [facility]     [address] [id number]

[address] [place] [address], [place] [address]

                                         

 

                          [facility]     [address] [id number]

[address] [place] [address], [place] [address]

                                         

 

                          [facility]     [address] [id number]

[address] [place] [address], [place] [address]

                                         

 

                          [facility]     [address] [id number]

[address] [place] [address]

                                         

 

                          [facility]     [address] [id number]

[address] [place] [address], [place] [address]

                          27 Sep, 2013               

 

                          [facility]     [address] [id number]

[address] [place] [address], [place] [address]

                          05 Sep, 2013               

 

                          [facility]     [address] [id number]

[address] [place] [address], [place] [address]

                                         

 

                          [facility]     [address] [id number]

[address] [place] [address], [place] [address]

                          12 2013               

 

                          [facility]     [address] [id number]

[address] [place] [address], [place] [address]

                          05 2013               

 

                          [facility]     [address] [id number]

[address] [place] [address], [place] [address]

                                         

 

                          [facility]     [address] [id number]

[address] [place] [address], [place] [address]

                          31 May, 2013               

 

                          [facility]     [address] [id number]

[address] [place] [address], [place] [address]

                          06 May, 2013               

 

                          [facility]     [address] [id number]

[address] [place] [address], [place] [address]

                                         

 

                          [facility]     [address] [id number]

[address] [place] [address], [place] [address]

                          28 Mar, 2013               

 

                          [facility]     [address] [id number]

[address] [place] [address], [place] [address]

                          18 Mar, 2013               

 

                          [facility]     [address] [id number]

[address] [place] [address], [place] [address]

                          14 Mar, 2013               

 

                          [facility]     [address] [id number]

[address] [place] [address], [place] [address]

                          13 Mar, 2013               

 

                          [facility]     [address] [id number]

[address] [place] [address], [place] [address]

                          12 Mar, 2013               

 

                          [facility]     [address] [id number]

[address] [place] [address], [place] [address]

                          11 Mar, 2013               

 

                          [facility]     [address] [id number]

[address] [place] [address], [place] [address]

                          06 Mar, 2013               

 

                          [facility]     [address] [id number]

[address] [place] [address], [place] [address]

                          06 Mar, 2013               

 

                          [facility]     [address] [id number]

[address] [place] [address], [place] [address]

                          06 Mar, 2013               

 

                          [facility]     [address] [id number]

[address] [place] [address], [place] [address]

                          06 Mar, 2013               

 

                          [facility]     [address] [id number]

[address] [place] [address], [place] [address]

                          05 Mar, 2013               

 

                          [facility]     [address] [id number]

[address] [place] [address], [place] [address]

                                         

 

                          [facility]     [address] [id number]

[address] [place] [address], [place] [address]

                                         

 

                          [facility]     [address] [id number]

[address] [place] [address], [place] [address]

                                         

 

                          [facility]     [address] [id number]

[address] [place] [address], [place] [address]

                                         

 

                          [facility]     [address] [id number]

[address] [place] [address], [place] [address]

                                         

 

                    [facility] [address] [id number] [place] 

[place] [id number] 20 Aug, 2012

                                         

 

                          [facility]     [address] [id number]

[address] [place] [address], [place] [address]

                                         

 

                          [facility]     [address] [id number]

[address] [place] [address], [place] [address]

                                         

 

                          [facility]     [address] [id number]

[address] [place] [address], [place] [address]

                                         

 

                          [facility]     [address] [id number]

[address] [place] [address], [place] [address]

                                         

 

                          [facility]     [address] [id number]

[address] [place] [address], [place] [address]

                                         

 

                          [facility]     [address] [id number]

[address] [place] [address], [place] [address]

                                         

 

                          [facility]     [address] [id number]

[address] [place] [address], [place] [address]

                                         

 

                          [facility]     [address] [id number]

[address] [place] [address], [place] [address]

                                         

 

                          [facility]     [address] [id number]

[address] [place] [address], [place] [address]

                          26 Mar, 2012               

 

                          [facility]     [address] [id number]

[address] [place] [address], [place] [address]

                                         

 

                          [facility]     [address] [id number]

[address] [place] [address], [place] [address]

                                         

 

                          [facility]     [address] [id number]

[address] [place] [address], [place] [address]

                          12 Dec, 2011               

 

                          [facility]     [address] [id number]

[address] [place] [address], [place] [address]

                                         

 

                          [facility]     [address] [id number]

[address] [place] [address], [place] [address]

                                         

 

                          [facility]     [address] [id number]

[address] [place] [address], [place] [address]

                                         

 

                          [facility]     [address] [id number]

[address] [place] [address], [place] [address]

                                         

 

                          [facility]     [address] [id number]

[address] [place] [address], [place] [address]

                          31 Oct, 2011               

 

                          [facility]     [address] [id number]

[address] [place] [address], [place] [address]

                          17 Oct, 2011               

 

                          [facility]     [address] [id number]

[address] [place] [address], [place] [address]

                          17 Oct, 2011               

 

                          [facility]     [address] [id number]

[address] [place] [address], [place] [address]

                          10 Oct, 2011               

 

                          [facility]     [address] [id number]

[address] [place] [address]

                          22 Dec, 2010               

 

                          [facility]     [address] [id number]

[address] [place] [address], [place] [address]

                                         

 

                          [facility]     [address] [id number]

[address] [place] [address], [place] [address]

                                         

 

                          [facility]     [address] [id number]

[address] [place] [address], [place] [address]

                          19 May, 2010               

 

                          [facility]     [address] [id number]

[address] [place] [address]

                          17 May, 2010               

 

                          [facility]     [address] [id number]

[address] [place] [address]

                          13 May, 2010               

 

                          [facility]     [address] [id number]

[address] [place] [address]

                          11 May, 2010               

 

                          [facility]     [address] [id number]

[address] [place] [address]

                          10 May, 2010               

 

                          [facility]     [address] [id number]

[address] [place] [address]

                          11 Dec, 2009               

 

                          [facility]     [address] [id number]

[address] [place] [address]

                          11 Dec, 2009               

 

                          [facility]     [address] [facility] [id number]

[address] [place] [id number]

                                         

 

                          [facility]     [address] [facility] [id number]

[address] [place] [phone]

                                         







IMMUNIZATIONS

No Known Immunizations



SOCIAL HISTORY

Never Assessed



REASON FOR VISIT





PLAN OF CARE





VITAL SIGNS





MEDICATIONS

Unknown Medications



RESULTS

No Results



PROCEDURES

No Known procedures



INSTRUCTIONS





MEDICATIONS ADMINISTERED

No Known Medications



MEDICAL (GENERAL) HISTORY





                    Type                Description         Date

 

                    Medical History     asthma-dx at age 11-12  

 

                          Medical History           hypertension-hx of pre-eclam

psia with second pregnancy, was 

induced at 35 weeks                      

 

                    Medical History     cardiomyopathy-postpartum (non-ischemic)

 (Stephanie)  

 

                    Medical History     depression           

 

                    Medical History     [facility]                  

 

                    Surgical History    tonsillectomy        

 

                    Surgical History    tubal               2014

 

                    Surgical History    tonsillectomy        

 

                          Surgical History          tubal [id number]-[id number] dev

eloped respiratory issues and heart

failure following the surgery           2011

 

                    Surgical History    echo-2010, 8/19/10, , 11  

 

                          Hospitalization History   PPD #4 for non-ischemic card

iomyopathy, respiratory 

distress due to pulmonary edema, ARF    2010

 

                    Hospitalization History surgeries

## 2020-08-18 NOTE — XMS REPORT
[facility]

                             Created on: 2020



Love Chery

External Reference #: [id number]

: 1985

Sex: Female



Demographics





                          Address                   [address]

[place]  [address]

 

                          Preferred Language        Unknown

 

                          Marital Status            Unknown

 

                          Taoism Affiliation     Unknown

 

                          Race                      Unknown

 

                          Ethnic Group              Unknown





Author





                          Author                    Love GONZALEZ

 

                          Organization              [facility] [place]

 

                          Address                   [address] [place]  [address]



 

                          Phone                     [phone]







Care Team Providers





                    Care Team Member Name Role                Phone

 

                    PEPE GONZALEZ Unavailable         [phone]







PROBLEMS





          Type      Condition ICD9-CM Code NPP92-XR Code Onset Dates Condition S

tatus SNOMED 

Code

 

          Problem   Seasonal allergic rhinitis due to pollen           J30.1    

           Active    04500624

 

                          Problem                   Heart failure, unspecified H

F chronicity, unspecified heart failure type

                          I50.9                     Active       53144321

 

          Problem   Asthma              J45.909             Active    [id number]







ALLERGIES

No Information



ENCOUNTERS





                Encounter       Location        Date            Diagnosis

 

                          [facility] WALK IN [facility]  [address] [facility] [id number]

[address] [place] [address] 

[address]                05 Mar, 2020              Sore throat J02.9

 

                          [facility] [place]     [address] [id number]

[address] [place] [address]

                          09 Sep, 2019               

 

                          [facility] [place]     [address] [id number]

[address] [place] [address]

                                         

 

                          [facility] [place]     [address] [id number]

[address] [place] [address]

                          17 May, 2019               

 

                          [facility] [place]     [address] [facility] [id number]

[address] [place] [address]

                          14 May, 2019               

 

                          [facility] [place]     [address] [id number]

[address] [place] [address]

                          28 Mar, 2019               

 

                          [facility] [place]     [address] [facility] [id number]

[address] [place] [address]

                          06 Mar, 2019              Morbid obesity E66.01 and Lo

calized edema R60.0

 

                          [facility] [place]     [address] [id number]

[address] [place] [address]

                                         

 

                          [facility] [place]     [address] [address]

                          10 Charles, 2019               

 

                          [facility] [place]     [address] [address]

                          10 Charles, 2019              Heart failure, unspecified H

F chronicity, unspecified heart failure

type I50.9

 

                          [facility]  [address] 

[address]                              Seasonal allergic rhinitis d

ue to pollen J30.1 and 

Asthma J45.909

 

                          [facility] WALK IN CARE  [address] 

[address]                              Seasonal allergic rhinitis d

ue to pollen J30.1

 

                          [facility] WALK IN [facility]  [address] 

[address]                19 Oct, 2016              Acute upper respiratory infe

ction, unspecified J06.9

 

                          [place] [address] [address]

                          05 Oct, 2016               

 

                          [facility] WALK IN [facility]  [address] 

[address]                13 May, 2016              Environmental allergies Z91.

09

 

                          [place] [address] [address]

                          24 Aug, 2015              Abscess 682.9

 

                          [place] [address] [address]

                          17 Aug, 2015              Mood disorder 296.90

 

                          [place] [address] [address]

                                        Screen for STD (sexually tra

nsmitted disease) V74.5

 

                          [place] [address] [address]

                          15 Jul, 2015               

 

                          [place] [address] [address]

                                        Depression 311

 

                          [place] [address] [address]

                                        Major depressive disorder, r

ecurrent episode, severe 296.33 and No 

condition on Axis II V71.09

 

                          [facility]     [address] [id number]

[address] [place] [address], [place] [address]

                          10 2015               

 

                          [facility]     [address] [id number]

[address] [place] [address], [place] [address]

                          14 2015               

 

                          [facility]     [address] [id number]

[address] [place] [address]

                          13 2015               

 

                          [facility]     [address] [id number]

[address] [place] [address], [place] [address]

                          16 Sep, 2014               

 

                          [facility]     [address] [id number]

[address] [place] [address], [place] [address]

                          16 Sep, 2014               

 

                          [facility]     [address] [id number]

[address] [place] [address]

                          15 Sep, 2014               

 

                          [facility]     [address] [id number]

[address] [place] [address], [place] [address]

                          15 Sep, 2014               

 

                          [facility]     [address] [id number]

[address] [place] [address]

                          12 Sep, 2014               

 

                          [facility]     [address] [id number]

[address] [place] [address], [place] [address]

                          12 Sep, 2014               

 

                          [facility]     [address] [id number]

[address] [place] [address]

                          06 Aug, 2014               

 

                          [facility]     [address] [id number]

[address] [place] [address]

                          06 Aug, 2014               

 

                          [facility]     [address] [id number]

[address] [place] [address]

                                         

 

                          [facility]     [address] [id number]

[address] [place] [address]

                                         

 

                          [facility]     [address] [id number]

[address] [place] [address]

                          10 Adam, 2014               

 

                          [facility]     [address] [id number]

[address] [place] [address]

                          10 Adam, 2014               

 

                          [facility]     [address] [id number]

[address] [place] [address], [place] [address]

                          07 May, 2014               

 

                          [facility]     [address] [id number]

[address] [place] [address], [place] [address]

                          07 May, 2014               

 

                          [facility]     [address] [id number]

[address] [place] [address], [place] [address]

                          05 May, 2014               

 

                          [facility]     [address] [id number]

[address] [place] [address], [place] [address]

                          05 May, 2014               

 

                          [facility]     [address] [id number]

[address] [place] [address], [place] [address]

                                         

 

                          [facility]     [address] [id number]

[address] [place] [address], [place] [address]

                                         

 

                          [facility]     [address] [id number]

[address] [place] [address], [place] [address]

                          25 Mar, 2014               

 

                          [facility]     [address] [id number]

[address] [place] [address], [place] [address]

                          24 Mar, 2014               

 

                          [facility]     [address] [id number]

[address] [place] [address], [place] [address]

                          24 Mar, 2014               

 

                          [facility]     [address] [id number]

[address] [place] [address], [place] [address]

                          13 Mar, 2014               

 

                          [facility]     [address] [id number]

[address] [place] [address], [place] [address]

                          13 Mar, 2014               

 

                          [facility]     [address] [id number]

[address] [place] [address], [place] [address]

                                         

 

                          [facility]     [address] [id number]

[address] [place] [address], [place] [address]

                                         

 

                          [facility]     [address] [id number]

[address] [place] [address], [place] [address]

                                         

 

                          [facility]     [address] [id number]

[address] [place] [address], [place] [address]

                                         

 

                          [facility]     [address] [id number]

[address] [place] [address], [place] [address]

                                         

 

                          [facility]     [address] [id number]

[address] [place] [address], [place] [address]

                                         

 

                          [facility]     [address] [id number]

[address] [place] [address], [place] [address]

                                         

 

                          [facility]     [address] [id number]

[address] [place] [address], [place] [address]

                          19 Dec, 2013               

 

                          [facility]     [address] [id number]

[address] [place] [address], [place] [address]

                          19 Dec, 2013               

 

                          [facility]     [address] [id number]

[address] [place] [address], [place] [address]

                                         

 

                          [facility]     [address] [id number]

[address] [place] [address], [place] [address]

                                         

 

                          [facility]     [address] [id number]

[address] [place] [address], [place] [address]

                                         

 

                          [facility]     [address] [id number]

[address] [place] [address], [place] [address]

                                         

 

                          [facility]     [address] [id number]

[address] [place] [address], [place] [address]

                          27 Sep, 2013               

 

                          [facility]     [address] [id number]

[address] [place] [address], [place] [address]

                          05 Sep, 2013               

 

                          [facility]     [address] [id number]

[address] [place] [address], [place] [address]

                                         

 

                          [facility]     [address] [id number]

[address] [place] [address], [place] [address]

                                         

 

                          [facility]     [address] [id number]

[address] [place] [address], [place] [address]

                                         

 

                          [facility]     [address] [id number]

[address] [place] [address], [place] [address]

                                         

 

                          [facility]     [address] [id number]

[address] [place] [address], [place] [address]

                          31 May, 2013               

 

                          [facility]     [address] [id number]

[facility] [place] [address]

                          06 May, 2013               

 

                          [facility]     [address] [id number]

[facility] [place] [address]

                          29 2013               

 

                          [facility]     [address] [id number]

[address] [place] [address], [place] [address]

                          28 Mar, 2013               

 

                          [facility]     [address] [id number]

[address] [place] [address], [place] [address]

                          18 Mar, 2013               

 

                          [facility]     [address] [id number]

[address] [place] [address], [place] [address]

                          14 Mar, 2013               

 

                          [facility]     [address] [id number]

[address] [place] [address], [place] [address]

                          13 Mar, 2013               

 

                          [facility]     [address] [id number]

[address] [place] [address], [place] [address]

                          12 Mar, 2013               

 

                          [facility]     [address] [id number]

[address] [place] [address], [place] [address]

                          11 Mar, 2013               

 

                          [facility]     [address] [id number]

[address] [place] [address], [place] [address]

                          06 Mar, 2013               

 

                          [facility]     [address] [id number]

[address] [place] [address], [place] [address]

                          06 Mar, 2013               

 

                          [facility]     [address] [id number]

[address] [place] [address], [place] [address]

                          06 Mar, 2013               

 

                          [facility]     [address] [id number]

[address] [place] [address], [place] [address]

                          06 Mar, 2013               

 

                          [facility]     [address] [id number]

[address] [place] [address], [place] [address]

                          05 Mar, 2013               

 

                          [facility]     [address] [id number]

[address] [place] [address], [place] [address]

                          06 2013               

 

                          [facility]     [address] [id number]

[address] [place] [address], [place] [address]

                          18 2013               

 

                          [facility]     [address] [id number]

[address] [place] [address], [place] [address]

                                         

 

                          [facility]     [address] [id number]

[address] [place] [address], [place] [address]

                                         

 

                          [facility]     [address] [id number]

[address] [place] [address], [place] [address]

                                         

 

                    [facility]     [address] [id number] [place] 

[place] [id number] 20 Aug, 2012

                                         

 

                          [facility]     [address] [id number]

[address] [place] [address], [place] [address]

                                         

 

                          [facility]     [address] [id number]

[address] [place] [address], [place] [address]

                                         

 

                          [facility]     [address] [id number]

[address] [place] [address], [place] [address]

                                         

 

                          [facility]     [address] [id number]

[address] [place] [address], [place] [address]

                                         

 

                          [facility]     [address] [id number]

[address] [place] [address], [place] [address]

                                         

 

                          [facility]     [address] [id number]

[address] [place] [address], [place] [address]

                                         

 

                          [facility]     [address] [id number]

[address] [place] [address], [place] [address]

                                         

 

                          [facility]     [address] [id number]

[address] [place] [address], [place] [address]

                                         

 

                          [facility]     [address] [id number]

[address] [place] [address], [place] [address]

                          26 Mar, 2012               

 

                          [facility]     [address] [id number]

[address] [place] [address], [place] [address]

                                         

 

                          [facility]     [address] [id number]

[address] [place] [address], [place] [address]

                                         

 

                          [facility]     [address] [id number]

[address] [place] [address], [place] [address]

                          12 Dec, 2011               

 

                          [facility]     [address] [id number]

[address] [place] [address], [place] [address]

                                         

 

                          [facility]     [address] [id number]

[address] [place] [address], [place] [address]

                                         

 

                          [facility]     [address] [id number]

[address] [place] [address], [place] [address]

                                         

 

                          [facility]     [address] [id number]

[address] [place] [address], [place] [address]

                                         

 

                          [facility]     [address] [id number]

[address] [place] [address], [place] [address]

                          31 Oct, 2011               

 

                          [facility]     [address] [id number]

[address] [place] [address], [place] [address]

                          17 Oct, 2011               

 

                          [facility]     [address] [id number]

[address] [place] [address], [place] [address]

                          17 Oct, 2011               

 

                          [facility]     [address] [id number]

[address] [place] [address], [place] [address]

                          10 Oct, 2011               

 

                          [facility]     [address] [id number]

[address] [place] [address], [place] [address]

                          22 Dec, 2010               

 

                          [facility]     [address] [id number]

[address] [place] [address], [place] [address]

                                         

 

                          [facility]     [address] [id number]

[address] [place] [address], [place] [address]

                                         

 

                          [facility]     [address] [id number]

[address] [place] [address], [place] [address]

                          19 May, 2010               

 

                          [facility]     [address] [id number]

[address] [place] [address], [place] [address]

                          17 May, 2010               

 

                          [facility]     [address] [id number]

[address] [place] [address], [place] [address]

                          13 May, 2010               

 

                          [facility]     [address] [id number]

[address] [place] [address], [place] [address]

                          11 May, 2010               

 

                          [facility]     [address] [id number]

[address] [place] [address], [place] [address]

                          10 May, 2010               

 

                          [facility] [place]     [address] [facility] [id number]

[address] [place] [address]

                          11 Dec, 2009               

 

                          [facility] [place]     [address] [facility] [id number]

[address] [place] [id number]

                          11 Dec, 2009               

 

                          [facility], [place]     [address] [facility] [id number]

[address] [place] [id number]

                                         

 

                          [facility], [place]     [address] [facility] [id number]

[address] [place] [id number]

                                         







IMMUNIZATIONS

No Known Immunizations



SOCIAL HISTORY

Never Assessed



REASON FOR VISIT





PLAN OF CARE





VITAL SIGNS





                    Height              66 in               2014

 

                    Weight              275.8 lbs           2014

 

                    Temperature         97.1 degrees Fahrenheit 2014

 

                    Heart Rate          86 bpm              2014

 

                    Respiratory Rate    16                  2014

 

                    Blood pressure systolic 120 mmHg            2014

 

                    Blood pressure diastolic 78 mmHg             2014







MEDICATIONS

No Known Medications



RESULTS

No Results



PROCEDURES

No Known procedures



INSTRUCTIONS





MEDICATIONS ADMINISTERED

No Known Medications



MEDICAL (GENERAL) HISTORY





                    Type                Description         Date

 

                    Medical History     asthma-dx at age 11-12  

 

                          Medical History           hypertension-hx of pre-eclam

psia with second pregnancy, was 

induced at 35 weeks                      

 

                    Medical History     cardiomyopathy-postpartum (non-ischemic)

 (Stephanie)  

 

                    Medical History     depression           

 

                    Medical History     CHF                  

 

                    Surgical History    tonsillectomy        

 

                    Surgical History    tubal               

 

                    Surgical History    tonsillectomy        

 

                          Surgical History          tubal [id number] dev

eloped respiratory issues and heart

failure following the surgery           2011

 

                    Surgical History    echo-2010, 8/19/10, , 9  

 

                          Hospitalization History   PPD #4 for non-ischemic card

iomyopathy, respiratory 

distress due to pulmonary edema, ARF    2010

 

                    Hospitalization History surgeries

## 2020-08-18 NOTE — XMS REPORT
[facility]

                             Created on: 2020



Love Chery

External Reference #: [id number]

: 1985

Sex: Female



Demographics





                          Address                   [address] [place]  [address]

 

                          Preferred Language        Unknown

 

                          Marital Status            Unknown

 

                          Islam Affiliation     Unknown

 

                          Race                      Unknown

 

                          Ethnic Group              Unknown





Author





                          Author                    Love SHETH

 

                          [facility]

 

                          Address                   [address] [place]  [address]



 

                          Phone                     [phone]







Care Team Providers





                    Care Team Member Name Role                Phone

 

                    MERYLEDGARDA        Unavailable         [phone]







PROBLEMS





          Type      Condition ICD9-CM Code CLW20-FG Code Onset Dates Condition S

tatus SNOMED 

Code

 

          Problem   Seasonal allergic rhinitis due to pollen           J30.1    

           Active    [id number]

 

                          Problem                   Heart failure, unspecified H

F chronicity, unspecified heart failure type

                          I50.9                     Active       19476860

 

          Problem   Asthma              J45.909             Active    [id number]







ALLERGIES

No Information



ENCOUNTERS





                Encounter       Location        Date            Diagnosis

 

                          [facility]  [address] [facility] [id number]

[address] [place] [address] 

[address]                09 May, 2020              Dental abscess K04.7

 

                          [facility]  [address] [id number]

[address] [place] [address] 

[address]                              Acute left-sided low back pa

in without sciatica M54.5

 

                          [facility]  [address] [facility] [id number]

[address] [place] [address] 

[address]                05 Mar, 2020              Sore throat J02.9

 

                          [facility]     [address] [facility] [id number]

[address] [place] [address]

                          09 Sep, 2019               

 

                          [facility]     [address] [id number]

[address] [place] [address]

                                         

 

                          [facility]     [address] [id number]

[address] [place] [address]

                          17 May, 2019               

 

                          [facility]     [address] [facility] [id number]

[address] [place] [address]

                          14 May, 2019               

 

                          [facility]     [address] [facility] [id number]

[address] [place] [address]

                          28 Mar, 2019               

 

                          [facility]     [address] [address]

                          06 Mar, 2019              Morbid obesity E66.01 and Lo

calized edema R60.0

 

                          [place] [address] [address]

                          14 2019               

 

                          [facility]     [address] [address]

                          10 Charles, 2019               

 

                          [facility]     [address] [address]

                          10 Charles, 2019              Heart failure, unspecified H

F chronicity, unspecified heart failure

type I50.9

 

                          [facility]  [address] 

[address]                              Seasonal allergic rhinitis d

ue to pollen J30.1 and 

Asthma J45.909

 

                          [facility] WALK IN [place] [address] 

[address]                              Seasonal allergic rhinitis d

ue to pollen J30.1

 

                          [facility]  [address] 

[address]                19 Oct, 2016              Acute upper respiratory infe

ction, unspecified J06.9

 

                          [place] [address] [address]

                          05 Oct, 2016               

 

                          [facility]ALK IN [facility]  [address] 

[address]                13 May, 2016              Environmental allergies Z91.

09

 

                          [facility]     [address] [address]

                          24 Aug, 2015              Abscess 682.9

 

                          [place] [address] [address]

                          17 Aug, 2015              Mood disorder 296.90

 

                          [place] [address] [address]

                                        Screen for STD (sexually tra

nsmitted disease) V74.5

 

                          [place] [address] [address] [place] [address]

[address] [place] [address]

                          15 2015               

 

                          [facility]     [address] [id number]

[address] [place] [address]

                          13 2015              Depression 311

 

                          [facility]     [address] [id number]

[address] [place] [address]

                          13 2015              Major depressive disorder, r

ecurrent episode, severe 296.33 and No 

condition on Axis II V71.09

 

                          [facility]     [address] [id number]

[address] [place] [address]

                          10 2015               

 

                          [facility]     [address] [id number]

[address] [place] [address]

                          14 2015               

 

                          [facility]     [address] [id number]

[address] [place] [address]

                          13 2015               

 

                          [facility]     [address] [id number]

[address] [place] [address]

                          16 Sep, 2014               

 

                          [facility]     [address] [id number]

[address] [place] [address]

                          16 Sep, 2014               

 

                          [facility]     [address] [id number]

[address] [place] [address]

                          15 Sep, 2014               

 

                          [facility]     [address] [id number]

[address] [place] [address]

                          15 Sep, 2014               

 

                          [facility]     [address] [id number]

[address] [place] [address]

                          12 Sep, 2014               

 

                          [facility]     [address] [id number]

[address] [place] [address]

                          12 Sep, 2014               

 

                          [facility]     [address] [id number]

[address] [place] [address]

                          06 Aug, 2014               

 

                          [facility]     [address] [id number]

[address] [place] [address]

                          06 Aug, 2014               

 

                          [facility]     [address] [id number]

[address] [place] [address]

                                         

 

                          [facility]     [address] [id number]

[facility] [place] [address]

                                         

 

                          [facility]     [address] [id number]

[address] [place] [address]

                          10 [place], 2014               

 

                          [facility]     [address] [id number]

[address] [place] [address]

                          10 Adam, 2014               

 

                          [facility]     [address] [id number]

[address] [place] [address], [place] [address]

                          07 May, 2014               

 

                          [facility]     [address] [id number]

[address] [place] [address], [place] [address]

                          07 May, 2014               

 

                          [facility]     [address] [id number]

[address] [place] [address], [place] [address]

                          05 May, 2014               

 

                          [facility]     [address] [id number]

[address] [place] [address], [place] [address]

                          05 May, 2014               

 

                          [facility]     [address] [id number]

[address] [place] [address], [place] [address]

                                         

 

                          [facility]     [address] [id number]

[address] [place] [address], [place] [address]

                                         

 

                          [facility]     [address] [id number]

[address] [place] [address], [place] [address]

                          25 Mar, 2014               

 

                          [facility]     [address] [id number]

[address] [place] [address], [place] [address]

                          24 Mar, 2014               

 

                          [facility]     [address] [id number]

[address] [place] [address], [place] [address]

                          24 Mar, 2014               

 

                          [facility]     [address] [id number]

[address] [place] [address], [place] [address]

                          13 Mar, 2014               

 

                          [facility]     [address] [id number]

[address] [place] [address], [place] [address]

                          13 Mar, 2014               

 

                          [facility]     [address] [id number]

[address] [place] [address], [place] [address]

                                         

 

                          [facility]     [address] [id number]

[address] [place] [address], [place] [address]

                                         

 

                          [facility]     [address] [id number]

[address] [place] [address], [place] [address]

                                         

 

                          [facility]     [address] [id number]

[address] [place] [address], [place] [address]

                                         

 

                          [facility]     [address] [id number]

[address] [place] [address], [place] [address]

                                         

 

                          [facility]     [address] [id number]

[address] [place] [address], [place] [address]

                                         

 

                          [facility]     [address] [id number]

[address] [place] [address], [place] [address]

                                         

 

                          [facility]     [address] [id number]

[address] [place] [address], [place] [address]

                          19 Dec, 2013               

 

                          [facility]     [address] [id number]

[address] [place] [address], [place] [address]

                          19 Dec, 2013               

 

                          [facility]     [address] [id number]

[address] [place] [address], [place] [address]

                                         

 

                          [facility]     [address] [id number]

[address] [place] [address], [place] [address]

                                         

 

                          [facility]     [address] [id number]

[address] [place] [address], [place] [address]

                                         

 

                          [facility]     [address] [id number]

[address] [place] [address], [place] [address]

                                         

 

                          [facility]     [address] [id number]

[address] [place] [address], [place] [address]

                          27 Sep, 2013               

 

                          [facility]     [address] [id number]

[address] [place] [address], [place] [address]

                          05 Sep, 2013               

 

                          [facility]     [address] [id number]

[address] [place] [address], [place] [address]

                                         

 

                          [facility]     [address] [id number]

[address] [place] [address], [place] [address]

                                         

 

                          [facility]     [address] [id number]

[facility] [place] [address]

                          05 2013               

 

                          [facility]     [address] [id number]

[address] [place] [address], [place] [address]

                                         

 

                          [facility]     [address] [id number]

[address] [place] [address], [place] [address]

                          31 May, 2013               

 

                          [facility]     [address] [id number]

[address] [place] [address], [place] [address]

                          06 May, 2013               

 

                          [facility]     [address] [id number]

[address] [place] [address], [place] [address]

                                         

 

                          [facility]     [address] [id number]

[address] [place] [address], [place] [address]

                          28 Mar, 2013               

 

                          [facility]     [address] [id number]

[address] [place] [address], [place] [address]

                          18 Mar, 2013               

 

                          [facility]     [address] [id number]

[address] [place] [address], [place] [address]

                          14 Mar, 2013               

 

                          [facility]     [address] [id number]

[address] [place] [address], [place] [address]

                          13 Mar, 2013               

 

                          [facility]     [address] [id number]

[address] [place] [address], [place] [address]

                          12 Mar, 2013               

 

                          [facility]     [address] [id number]

[address] [place] [address], [place] [address]

                          11 Mar, 2013               

 

                          [facility]     [address] [id number]

[address] [place] [address], [place] [address]

                          06 Mar, 2013               

 

                          [facility]     [address] [id number]

[address] [place] [address], [place] [address]

                          06 Mar, 2013               

 

                          [facility]     [address] [id number]

[address] [place] [address], [place] [address]

                          06 Mar, 2013               

 

                          [facility]     [address] [id number]

[address] [place] [address], [place] [address]

                          06 Mar, 2013               

 

                          [facility]     [address] [id number]

[address] [place] [address], [place] [address]

                          05 Mar, 2013               

 

                          [facility]     [address] [id number]

[address] [place] [address], [place] [address]

                                         

 

                          [facility]     [address] [id number]

[address] [place] [address], [place] [address]

                                         

 

                          [facility]     [address] [id number]

[address] [place] [address], [place] [address]

                                         

 

                          [facility]     [address] [id number]

[address] [place] [address], [place] [address]

                                         

 

                          [facility]     [address] [id number]

[address] [place] [address], [place] [address]

                                         

 

                    [facility]     [address] [id number] [place], 

S [id number] 20 Aug, 2012

                                         

 

                          [facility]     [address] [id number]

[address] [place] [address], [place] [address]

                                         

 

                          [facility]     [address] [id number]

[address] [place] [address], [place] [address]

                                         

 

                          [facility]     [address] [id number]

[address] [place] [address], [place] [address]

                                         

 

                          [facility]     [address] [id number]

[address] [place] [address], [place] [address]

                                         

 

                          [facility]     [address] [id number]

[address] [place] [address], [place] [address]

                                         

 

                          [facility]     [address] [id number]

[address] [place] [address], [place] [address]

                                         

 

                          [facility]     [address] [id number]

[address] [place] [address], [place] [address]

                                         

 

                          [facility]     [address] [id number]

[address] [place] [address], [place] [address]

                                         

 

                          [facility]     [address] [id number]

[address] [place] [address], [place] [address]

                          26 Mar, 2012               

 

                          [facility]     [address] [id number]

[address] [place] [address], [place] [address]

                                         

 

                          [facility]     [address] [id number]

[address] [place] [address], [place] [address]

                                         

 

                          [facility]     [address] [id number]

[address] [place] [address], [place] [address]

                          12 Dec, 2011               

 

                          [facility]     [address] [id number]

[address] [place] [address], [place] [address]

                                         

 

                          [facility]     [address] [id number]

[address] [place] [address], [place] [address]

                                         

 

                          [facility]     [address] [id number]

[address] [place] [address], [place] [address]

                                         

 

                          [facility]     [address] [id number]

[address] [place] [address], [place] [address]

                                         

 

                          [facility]     [address] [id number]

[address] [place] [address], [place] [address]

                          31 Oct, 2011               

 

                          [facility]     [address] [id number]

[address] [place] [address], [place] [address]

                          17 Oct, 2011               

 

                          [facility]     [address] [id number]

[address] [place] [address], [place] [address]

                          17 Oct, 2011               

 

                          [facility]     [address] [id number]

[address] [place] [address], [place] [address]

                          10 Oct, 2011               

 

                          [facility]     [address] [id number]

[address] [place] [address], [place] [address]

                          22 Dec, 2010               

 

                          [facility]     [address] [id number]

[address] [place] [address]

                                         

 

                          [facility]     [address] [id number]

[address] [place] [address], [place] [address]

                                         

 

                          [facility]     [address] [id number]

[address] [place] [address], [place] [address]

                          19 May, 2010               

 

                          [facility]     [address] [id number]

[address] [place] [address], [place] [address]

                          17 May, 2010               

 

                          [facility]     [address] [id number]

[address] [place] [address]

                          13 May, 2010               

 

                          [facility]     [address] [id number]

[address] [place] [address]

                          11 May, 2010               

 

                          [facility]     [address] [facility] [id number]

[address] [place] [id number]

                          10 May, 2010               

 

                          [facility]     [address] [facility] [id number]

[address] [place] [id number]

                          11 Dec, 2009               

 

                          [facility]     [address] [facility] [id number]

[address] [place] [id number]

                          11 Dec, 2009               

 

                          [facility]     [address] [facility] [id number]

[address] [place] [id number]

                                         

 

                          [facility]     [address] [facility] [id number]

[address] [place] [id number]

                                         







IMMUNIZATIONS

No Known Immunizations



SOCIAL HISTORY

Never Assessed



REASON FOR VISIT





PLAN OF CARE





VITAL SIGNS





MEDICATIONS

No Known Medications



RESULTS

No Results



PROCEDURES

No Known procedures



INSTRUCTIONS





MEDICATIONS ADMINISTERED

No Known Medications



MEDICAL (GENERAL) HISTORY





                    Type                Description         Date

 

                    Medical History     asthma-dx at age 11-12  

 

                          Medical History           hypertension-hx of pre-eclam

psia with second pregnancy, was 

induced at 35 weeks                      

 

                    Medical History     cardiomyopathy-postpartum (non-ischemic)

 (Stephanie)  

 

                    Medical History     depression           

 

                    Medical History     [facility]                  

 

                    Surgical History    tonsillectomy        

 

                    Surgical History    tubal               2014

 

                    Surgical History    tonsillectomy        

 

                          Surgical History          tubal biwtmeed-Eryrjf-zd dev

eloped respiratory issues and heart

failure following the surgery           2011

 

                    Surgical History    echo-2010, 8/19/10, , 11  

 

                          Hospitalization History   PPD #4 for non-ischemic card

iomyopathy, respiratory 

distress due to pulmonary edema, ARF    2010

 

                    Hospitalization History surgeries

## 2020-08-18 NOTE — XMS REPORT
[facility]

                             Created on: 2020



Love Chery

External Reference #: [id number]

: 1985

Sex: Female



Demographics





                          Address                   [address]

[place]  [address]

 

                          Preferred Language        Unknown

 

                          Marital Status            Unknown

 

                          Alevism Affiliation     Unknown

 

                          Race                      Unknown

 

                          Ethnic Group              Unknown





Author





                          Author                    [facility]

 

                          Organization              [facility] [place]

 

                          Address                   [address] [place]  [address]



 

                          Phone                     [phone]







Care Team Providers





                    Care Team Member Name Role                Phone

 

                    SAMANTHA DE LA CRUZYADIEL Unavailable         [phone]







PROBLEMS





          Type      Condition ICD9-CM Code HAM03-QA Code Onset Dates Condition S

tatus SNOMED 

Code

 

          Problem   Seasonal allergic rhinitis due to pollen           J30.1    

           Active    [id number]

 

                          Problem                   Heart failure, unspecified H

F chronicity, unspecified heart failure type

                          I50.9                     Active       35239468

 

          Problem   Asthma              J45.909             Active    [id number]







ALLERGIES

No Information



ENCOUNTERS





                Encounter       Location        Date            Diagnosis

 

                          [facility]  [address] [facility] [id number]

[address] [place] [address] 

[id number]                05 Mar, 2020              Sore throat J02.9

 

                          [facility] [place]     [address] [facility] [id number]

[address] [place] [address]

                          09 Sep, 2019               

 

                          [facility] [place]     [address] [facility] [id number]

[address] [place] [address]

                                         

 

                          [facility] [place]     [address] [facility] [id number]

[address] [place] [address]

                          17 May, 2019               

 

                          [facility] [place]     [address] [facility] [id number]

[address] [place] [address]

                          14 May, 2019               

 

                          [facility] [place]     [address] [facility] [id number]

[address] [place] [address]

                          28 Mar, 2019               

 

                          [facility] [place]     [address] [id number]

[address] [place] [address]

                          06 Mar, 2019              Morbid obesity E66.01 and Lo

calized edema R60.0

 

                          [facility] [place]     [address] [facility] [id number]

[address] [place] [address]

                                         

 

                          [facility] [place]     [address] [id number]

[address] [place] [address]

                          10 Charles, 2019               

 

                          [facility] [place]     [address] [address]

                          10 Charles, 2019              Heart failure, unspecified H

F chronicity, unspecified heart failure

type I50.9

 

                          [facility]  [address] 

[address]                              Seasonal allergic rhinitis d

ue to pollen J30.1 and 

Asthma J45.909

 

                          [facility]  [address] 

[address]                              Seasonal allergic rhinitis d

ue to pollen J30.1

 

                          [facility]  [address] 

[address]                19 Oct, 2016              Acute upper respiratory infe

ction, unspecified J06.9

 

                          [place] [address] [address]

                          05 Oct, 2016               

 

                          [facility]  [address] 

[address]                13 May, 2016              Environmental allergies Z91.

09

 

                          [place] [address] [address]

                          24 Aug, 2015              Abscess 682.9

 

                          [place] [address] [address]

                          17 Aug, 2015              Mood disorder 296.90

 

                          [place] [address] [address]

                                        Screen for STD (sexually tra

nsmitted disease) V74.5

 

                          [place] [address] [address]

                          15 2015               

 

                          [place] [address] [address]

                          13 2015              Depression 311

 

                          [place] [address] [address]

                          13 2015              Major depressive disorder, r

ecurrent episode, severe 296.33 and No 

condition on Axis II V71.09

 

                          [facility]     [address] [id number]

[address] [place] [address]

                          10 2015               

 

                          [facility]     [address] [id number]

[address] [place] [address]

                          14 2015               

 

                          [facility]     [address] [id number]

[address] [place] [address]

                          13 2015               

 

                          [facility]     [address] [id number]

[address] [place] [address]

                          16 Sep, 2014               

 

                          [facility]     [address] [id number]

[address] [place] [address]

                          16 Sep, 2014               

 

                          [facility]     [address] [id number]

[address] [place] [address]

                          15 Sep, 2014               

 

                          [facility]     [address] [id number]

[address] [place] [address]

                          15 Sep, 2014               

 

                          [facility]     [address] [id number]

[address] [place] [address]

                          12 Sep, 2014               

 

                          [facility]     [address] [id number]

[address] [place] [address]

                          12 Sep, 2014               

 

                          [facility]     [address] [id number]

[address] [place] [address]

                          06 Aug, 2014               

 

                          [facility]     [address] [id number]

[address] [place] [address]

                          06 Aug, 2014               

 

                          [facility]     [address] [id number]

[address] [place] [address]

                                         

 

                          [facility]     [address] [id number]

[address] [place] [address]

                                         

 

                          [facility]     [address] [id number]

[address] [place] [address]

                          10 Adam, 2014               

 

                          [facility]     [address] [id number]

[address] [place] [address] [place] [address]

                          10 Adam, 2014               

 

                          [facility]     [address] [id number]

[address] [place] [address], [place] [address]

                          07 May, 2014               

 

                          [facility]     [address] [id number]

[address] [place] [address], [place] [address]

                          07 May, 2014               

 

                          [facility]     [address] [id number]

[address] [place] [address], [place] [address]

                          05 May, 2014               

 

                          [facility]     [address] [id number]

[address] [place] [address], [place] [address]

                          05 May, 2014               

 

                          [facility]     [address] [id number]

[address] [place] [address], [place] [address]

                                         

 

                          [facility]     [address] [id number]

[address] [place] [address], [place] [address]

                                         

 

                          [facility]     [address] [id number]

[address] [place] [address], [place] [address]

                          25 Mar, 2014               

 

                          [facility]     [address] [id number]

[address] [place] [address], [place] [address]

                          24 Mar, 2014               

 

                          [facility]     [address] [id number]

[address] [place] [address], [place] [address]

                          24 Mar, 2014               

 

                          [facility]     [address] [id number]

[address] [place] [address], [place] [address]

                          13 Mar, 2014               

 

                          [facility]     [address] [id number]

[address] [place] [address], [place] [address]

                          13 Mar, 2014               

 

                          [facility]     [address] [id number]

[address] [place] [address], [place] [address]

                                         

 

                          [facility]     [address] [id number]

[address] [place] [address], [place] [address]

                                         

 

                          [facility]     [address] [id number]

[address] [place] [address], [place] [address]

                                         

 

                          [facility]     [address] [id number]

[address] [place] [address], [place] [address]

                                         

 

                          [facility]     [address] [id number]

[address] [place] [address], [place] [address]

                                         

 

                          [facility]     [address] [id number]

[address] [place] [address], [place] [address]

                                         

 

                          [facility]     [address] [id number]

[address] [place] [address], [place] [address]

                                         

 

                          [facility]     [address] [id number]

[address] [place] [address], [place] [address]

                          19 Dec, 2013               

 

                          [facility]     [address] [id number]

[address] [place] [address], [place] [address]

                          19 Dec, 2013               

 

                          [facility]     [address] [id number]

[address] [place] [address], [place] [address]

                                         

 

                          [facility]     [address] [id number]

[address] [place] [address], [place] [address]

                                         

 

                          [facility]     [address] [id number]

[address] [place] [address], [place] [address]

                                         

 

                          [facility]     [address] [id number]

[address] [place] [address], [place] [address]

                                         

 

                          [facility]     [address] [id number]

[address] [place] [address], [place] [address]

                          27 Sep, 2013               

 

                          [facility]     [address] [id number]

[address] [place] [address], [place] [address]

                          05 Sep, 2013               

 

                          [facility]     [address] [id number]

[address] [place] [address], [place] [address]

                                         

 

                          [facility]     [address] [id number]

[address] [place] [address], [place] [address]

                                         

 

                          [facility]     [address] [id number]

[address] [place] [address], [place] [address]

                                         

 

                          [facility]     [address] [id number]

[address] [place] [address], [place] [address]

                                         

 

                          [facility]     [address] [id number]

[address] [place] [address], [place] [address]

                          31 May, 2013               

 

                          [facility]     [address] [id number]

[address] [place] [address], [place] [address]

                          06 May, 2013               

 

                          [facility]     [address] [id number]

[address] [place] [address], [place] [address]

                                         

 

                          [facility]     [address] [id number]

[address] [place] [address], [place] [address]

                          28 Mar, 2013               

 

                          [facility]     [address] [id number]

[address] [place] [address], [place] [address]

                          18 Mar, 2013               

 

                          [facility]     [address] [id number]

[address] [place] [address], [place] [address]

                          14 Mar, 2013               

 

                          [facility]     [address] [id number]

[address] [place] [address], [place] [address]

                          13 Mar, 2013               

 

                          [facility]     [address] [id number]

[address] [place] [address], [place] [address]

                          12 Mar, 2013               

 

                          [facility]     [address] [id number]

[address] [place] [address], [place] [address]

                          11 Mar, 2013               

 

                          [facility]     [address] [id number]

[address] [place] [address], [place] [address]

                          06 Mar, 2013               

 

                          [facility]     [address] [id number]

[address] [place] [address], [place] [address]

                          06 Mar, 2013               

 

                          [facility]     [address] [id number]

[address] [place] [address], [place] [address]

                          06 Mar, 2013               

 

                          [facility]     [address] [id number]

[address] [place] [address], [place] [address]

                          06 Mar, 2013               

 

                          [facility]     [address] [id number]

[address] [place] [address], [place] [address]

                          05 Mar, 2013               

 

                          [facility]     [address] [id number]

[address] [place] [address], [place] [address]

                          06 2013               

 

                          [facility]     [address] [id number]

[address] [place] [address], [place] [address]

                                         

 

                          [facility]     [address] [id number]

[address] [place] [address], [place] [address]

                                         

 

                          [facility]     [address] [id number]

[address] [place] [address], [place] [address]

                                         

 

                          [facility]     [address] [id number]

[address] [place] [address], [place] [address]

                                         

 

                    [facility]     [address] [id number] [place] 

S [id number] 20 Aug, 2012

                                         

 

                          [facility]     [address] [id number]

[address] [place] [address], [place] [address]

                                         

 

                          [facility]     [address] [id number]

[address] [place] [address], [place] [address]

                                         

 

                          [facility]     [address] [id number]

[address] [place] [address], [place] [address]

                                         

 

                          [facility]     [address] [id number]

[address] [place] [address], [place] [address]

                                         

 

                          [facility]     [address] [id number]

[address] [place] [address], [place] [address]

                                         

 

                          [facility]     [address] [id number]

[address] [place] [address], [place] [address]

                                         

 

                          [facility]     [address] [id number]

[address] [place] [address], [place] [address]

                                         

 

                          [facility]     [address] [id number]

[address] [place] [address], [place] [address]

                                         

 

                          [facility]     [address] [id number]

[address] [place] [address], [place] [address]

                          26 Mar, 2012               

 

                          [facility]     [address] [id number]

[address] [place] [address], [place] [address]

                                         

 

                          [facility]     [address] [id number]

[address] [place] [address], [place] [address]

                                         

 

                          [facility]     [address] [id number]

[address] [place] [address], [place] [address]

                          12 Dec, 2011               

 

                          [facility]     [address] [id number]

[address] [place] [address], [place] [address]

                                         

 

                          [facility]     [address] [id number]

[address] [place] [address], [place] [address]

                                         

 

                          [facility]     [address] [id number]

[address] [place] [address], [place] [address]

                                         

 

                          [facility]     [address] [id number]

[address] [place] [address], [place] [address]

                                         

 

                          [facility]     [address] [id number]

[address] [place] [address], [place] [address]

                          31 Oct, 2011               

 

                          [facility]     [address] [id number]

[address] [place] [address], [place] [address]

                          17 Oct, 2011               

 

                          [facility]     [address] [id number]

[address] [place] [address], [place] [address]

                          17 Oct, 2011               

 

                          [facility]     [address] [id number]

[address] [place] [address], [place] [address]

                          10 Oct, 2011               

 

                          [facility]     [address] [id number]

[address] [place] [address], [place] [address]

                          22 Dec, 2010               

 

                          [facility]     [address] [id number]

[address] [place] [address], [place] [address]

                                         

 

                          [facility]     [address] [id number]

[address] [place] [address], [place] [address]

                                         

 

                          [facility]     [address] [id number]

[address] [place] [address], [place] [address]

                          19 May, 2010               

 

                          [facility]     [address] [id number]

[address] [place] [address], [place] [address]

                          17 May, 2010               

 

                          [facility]     [address] [id number]

[address] [place] [address], [place] [address]

                          13 May, 2010               

 

                          [facility]     [address] [id number]

[address] [place] [address], [place] [address]

                          11 May, 2010               

 

                          [facility]     [address] [id number]

[address] [place] [address], [place] [address]

                          10 May, 2010               

 

                          [facility] [place]     [address] [facility] [id number]

[address] [address]

                          11 Dec, 2009               

 

                          [facility] [place]     [address] [facility] [id number]

[address] [place] [id number]

                          11 Dec, 2009               

 

                          [facility] [place]     [address] [facility] [id number]

[address] [place] [id number]

                                         

 

                          [facility] [place]     [address] [facility] [id number]

[address] [place] [id number]

                                         







IMMUNIZATIONS

No Known Immunizations



SOCIAL HISTORY

Never Assessed



REASON FOR VISIT





PLAN OF CARE





VITAL SIGNS





MEDICATIONS

No Known Medications



RESULTS

No Results



PROCEDURES

No Known procedures



INSTRUCTIONS





MEDICATIONS ADMINISTERED

No Known Medications



MEDICAL (GENERAL) HISTORY





                    Type                Description         Date

 

                    Medical History     asthma-dx at age 11-12  

 

                          Medical History           hypertension-hx of pre-eclam

psia with second pregnancy, was 

induced at 35 weeks                      

 

                    Medical History     cardiomyopathy-postpartum (non-ischemic)

 (Stephanie)  

 

                    Medical History     depression           

 

                    Medical History     CH[facility]                  

 

                    Surgical History    tonsillectomy        

 

                    Surgical History    tubal               2014

 

                    Surgical History    tonsillectomy        

 

                          Surgical History          tubal [id number] dev

eloped respiratory issues and heart

failure following the surgery           2011

 

                    Surgical History    echo-2010, 8/19/10, , 11  

 

                          Hospitalization History   PPD #4 for non-ischemic card

iomyopathy, respiratory 

distress due to pulmonary edema, ARF    2010

 

                    Hospitalization History surgeries

## 2020-08-18 NOTE — ED EENT
History of Present Illness


General


Chief Complaint:  Dental Problems/Pain


Stated Complaint:  DENTAL PAIN


Nursing Triage Note:  


PT AMBULATE TO TRIAGE WITH C/O TOOTH PAIN X2 DAYS. PT STATES SHE HAS A TOOTH 


THAT WAS CHIPPED AND NOT SHE THINKS IT IS INFECTED. PT STATES SHE HAS NOT SEEN A




DENTIST FOR THIS C/O AND WAS GOING TO CALL TOMORROW FOR A APPOINTMENT.





History of Present Illness


Date Seen by Provider:  Aug 18, 2020


Time Seen by Provider:  21:15


Initial Comments


35-year-old  female presents for left lower molar pain that is been 

present for approximately 2 days ago. She reports that it was to be pulled 

several years ago when she's never followed up with a dentist. She has tried 

ibuprofen with minimal improvement in her symptoms.


Timing/Duration:  other (2 days)


Location:  dental


Prearrival Treatment:  no prearrival treatment


Associated Symptoms:  denies symptoms





Allergies and Home Medications


Allergies


Coded Allergies:  


     paroxetine (Unverified  Allergy, Unknown, 5/5/14)


Uncoded Allergies:  


     ANESTHETIC (Allergy, Unknown, 5/5/14)





Home Medications


Amoxicillin 500 Mg Capsule, 500 MG PO TID


   Prescribed by: SABINO ALVAREZ on 8/18/20 2123


Cefdinir 300 Mg Capsule, 300 MG PO BID


   Prescribed by: TIFFANY GILBERT on 1/10/19 1654


Metoprolol Succinate 25 Mg Tab.er.24h, 25 MG PO DAILY


   Prescribed by: TIFFANY GILBERT on 1/10/19 1654


Penicillin V Potassium 500 Mg Tablet, 500 MG PO QID


   Prescribed by: FELICITY TERESA on 2/16/20 1412


Sacubitril/Valsartan 1 Each Tablet, 1 TAB PO BID


   Prescribed by: TIFFANY GILBERT on 1/10/19 1654


Tramadol HCl 50 Mg Tablet, 50 MG PO Q6H PRN for PAIN


   Prescribed by: FELICITY TERESA on 2/16/20 1412





Patient Home Medication List


Home Medication List Reviewed:  Yes





Review of Systems


Review of Systems


Constitutional:  no symptoms reported, see HPI


Mouth:  see HPI; denies loose teeth; pain





All Other Systems Reviewed


Negative Unless Noted:  Yes





Past Medical-Social-Family Hx


Past Med/Social Hx:  Reviewed Nursing Past Med/Soc Hx


Patient Social History


Alcohol Use:  Occasionally Uses


Recreational Drug Use:  Yes


Drug of Choice:  POT


Smoking Status:  Current Everyday Smoker


Type Used:  Cigarettes


2nd Hand Smoke Exposure:  Yes


Recent Foreign Travel:  No


Contact w/Someone Who Travel:  No


Recent Infectious Disease Expo:  No


Recent Hopitalizations:  No


Physical Abuse:  No


Sexual Abuse:  No


Mistreated:  No


Fear:  No





Immunizations Up To Date


PED Vaccines UTD:  Yes


Date of Pneumonia Vaccine:  Oct 1, 2011


Date of Influenza Vaccine:  Jan 9, 2019





Seasonal Allergies


Seasonal Allergies:  No





Past Medical History


Surgeries:  Yes


Adenoidectomy, Tonsillectomy, Tubal Ligation


Respiratory:  Yes (VERY DIFFICULT TO INTUBATE)


Asthma


Cardiac:  Yes


Neurological:  No


Reproductive Disorders:  Yes


GYN History:  Tubal Ligation


Genitourinary:  No


Gastrointestinal:  No


Musculoskeletal:  No


Endocrine:  No


HEENT:  No


Cancer:  No


Psychosocial:  No


Integumentary:  No


Blood Disorders:  No





Family Medical History





Hypertension


  19 FATHER


  19 MOTHER


Myocardial infarction


  Maternal Grandmother


  Maternal Grandfather


Heart Disease, Other Conditions/Hx





Physical Exam


Vital Signs





Vital Signs - First Documented








 8/18/20





 21:02


 


Temp 36.7


 


Pulse 83


 


Resp 18


 


B/P (MAP) 127/88 (101)


 


O2 Delivery Room Air








Height, Weight, BMI


Height: 5'5.00"


Weight: 251lbs. 6.0oz. 113.621092ws; 45.00 BMI


Method:Estimated


General Appearance:  WD/WN, no apparent distress


Nose:  normal inspection; No active bleeding, No discharge


Mouth/Throat:  pharynx normal, maxillary swelling; No pharynx tenderness, No 

tongue swollen; other (dental tenderness and erythema left lower molar no 

purulent drainage)


Neck:  lymphadenopathy (L)


Cardiovascular:  normal peripheral pulses, regular rate, rhythm


Respiratory:  chest non-tender, lungs clear, normal breath sounds


Gastrointestinal:  normal bowel sounds, non tender, soft


Neurologic/Psychiatric:  alert, normal mood/affect, oriented x 3


Skin:  normal color, warm/dry





Progress/Results/Core Measures


Results/Orders


My Orders





Orders - SABINO ALVAREZ


Amoxicillin Capsule (Polymox Capsule) (8/18/20 21:29)


Tramadol Tablet (Ultram Tablet) (8/18/20 21:30)





Vital Signs/I&O











 8/18/20





 21:02


 


Temp 36.7


 


Pulse 83


 


Resp 18


 


B/P (MAP) 127/88 (101)


 


O2 Delivery Room Air














Blood Pressure Mean:                    101











Departure


Impression





   Primary Impression:  


   Dental abscess


Disposition:  01 HOME, SELF-CARE


Condition:  Improved





Departure-Patient Inst.


Decision time for Depature:  21:20


Referrals:  


[facility] (PCP/Family)


Primary Care Physician


Patient Instructions:  Dental Pain (DC)





Add. Discharge Instructions:  


Take antibiotics as directed.


Alternate between Tylenol 650 mg and ibuprofen 600 mg every 4 hours for pain.


Use Orajel to area of tenderness.


Apply warm moist compresses to your left cheek.


Call [facility] for appointment tomorrow.


Return to the emergency department for new, urgent health care needs





All discharge instructions reviewed with patient and/or family. Voiced understan

ding.


Scripts


Amoxicillin (Amoxicillin) 500 Mg Capsule


500 MG PO TID, #21 CAP 0 Refills


   Prov: SABINO ALVAREZ         8/18/20











SABINO ALVAREZ                  Aug 18, 2020 21:21

## 2020-08-18 NOTE — XMS REPORT
[facility]

                             Created on: 2020



Love Chery

External Reference #: [id number]

: 1985

Sex: Female



Demographics





                          Address                   [address]

[place]  [address]

 

                          Preferred Language        Unknown

 

                          Marital Status            Unknown

 

                          Denominational Affiliation     Unknown

 

                          Race                      Unknown

 

                          Ethnic Group              Unknown





Author





                          Author                    Love SHETH

 

                          [facility]

 

                          Address                   [address] [place]  [address]



 

                          Phone                     [phone]







Care Team Providers





                    Care Team Member Name Role                Phone

 

                    JACKIE SHETH        Unavailable         [phone]







PROBLEMS





          Type      Condition ICD9-CM Code IGP72-CT Code Onset Dates Condition S

tatus SNOMED 

Code

 

          Problem   Seasonal allergic rhinitis due to pollen           J30.1    

           Active    [id number]

 

                          Problem                   Heart failure, unspecified H

F chronicity, unspecified heart failure type

                          I50.9                     Active       91673111

 

          Problem   Asthma              J45.909             Active    [id number]







ALLERGIES

No Information



ENCOUNTERS





                Encounter       Location        Date            Diagnosis

 

                          [facility]  [address] [facility] [id number]

[address] [place] [address] 

[id number]                05 Mar, 2020              Sore throat J02.9

 

                          [facility] [place]     [address] [id number]

[address] [place] [address]

                          09 Sep, 2019               

 

                          [facility] [place]     [address] [facility] [id number]

[address] [place] [address]

                                         

 

                          [facility] [place]     [address] [id number]

[address] [place] [address]

                          17 May, 2019               

 

                          [facility] [place]     [address] [facility] [id number]

[address] [place] [address]

                          14 May, 2019               

 

                          [facility] [place]     [address] [id number]

[address] [place] [address]

                          28 Mar, 2019               

 

                          [facility] [place]     [address] [facility] [id number]

[address] [place] [address]

                          06 Mar, 2019              Morbid obesity E66.01 and Lo

calized edema R60.0

 

                          [facility] [place]     [address] [id number]

[address] [place] [address]

                                         

 

                          [facility] [place]     [address] [address]

                          10 Charles, 2019               

 

                          [facility] [place]     [address] [address]

                          10 Charles, 2019              Heart failure, unspecified H

F chronicity, unspecified heart failure

type I50.9

 

                          [facility]  [address] 

[address]                              Seasonal allergic rhinitis d

ue to pollen J30.1 and 

Asthma J45.909

 

                          [facility]  [address] 

[address]                              Seasonal allergic rhinitis d

ue to pollen J30.1

 

                          [facility]  [address] 

[address]                19 Oct, 2016              Acute upper respiratory infe

ction, unspecified J06.9

 

                          [place] [address] [address]

                          05 Oct, 2016               

 

                          [facility]  [address] 

[address]                13 May, 2016              Environmental allergies Z91.

09

 

                          [place] [address] [address]

                          24 Aug, 2015              Abscess 682.9

 

                          [place] [address] [address]

                          17 Aug, 2015              Mood disorder 296.90

 

                          [place] [address] [address]

                                        Screen for STD (sexually tra

nsmitted disease) V74.5

 

                          [place] [address] [address]

                          15 Jul, 2015               

 

                          [place] [address] [address]

                                        Depression 311

 

                          [place] [address] [address]

                                        Major depressive disorder, r

ecurrent episode, severe 296.33 and No 

condition on Axis II V71.09

 

                          [facility]     [address] [id number]

[address] [place] [address], [place] [address]

                          10 2015               

 

                          [facility]     [address] [id number]

[address] [place] [address], [place] [address]

                          14 2015               

 

                          [facility]     [address] [id number]

[address] [place] [address]

                          13 2015               

 

                          [facility]     [address] [id number]

[address] [place] [address], [place] [address]

                          16 Sep, 2014               

 

                          [facility]     [address] [id number]

[address] [place] [address], [place] [address]

                          16 Sep, 2014               

 

                          [facility]     [address] [id number]

[address] [place] [address]

                          15 Sep, 2014               

 

                          [facility]     [address] [id number]

[address] [place] [address], [place] [address]

                          15 Sep, 2014               

 

                          [facility]     [address] [id number]

[address] [place] [address]

                          12 Sep, 2014               

 

                          [facility]     [address] [id number]

[address] [place] [address], [place] [address]

                          12 Sep, 2014               

 

                          [facility]     [address] [id number]

[address] [place] [address]

                          06 Aug, 2014               

 

                          [facility]     [address] [id number]

[address] [place] [address]

                          06 Aug, 2014               

 

                          [facility]     [address] [id number]

[address] [place] [address]

                                         

 

                          [facility]     [address] [id number]

[address] [place] [address]

                                         

 

                          [facility]     [address] [id number]

[address] [place] [address]

                          10 Adam, 2014               

 

                          [facility]     [address] [id number]

[address] [place] [address]

                          10 Adam, 2014               

 

                          [facility]     [address] [id number]

[address] [place] [address], [place] [address]

                          07 May, 2014               

 

                          [facility]     [address] [id number]

[address] [place] [address], [place] [address]

                          07 May, 2014               

 

                          [facility]     [address] [id number]

[address] [place] [address], [place] [address]

                          05 May, 2014               

 

                          [facility]     [address] [id number]

[address] [place] [address], [place] [address]

                          05 May, 2014               

 

                          [facility]     [address] [id number]

[address] [place] [address], [place] [address]

                                         

 

                          [facility]     [address] [id number]

[address] [place] [address], [place] [address]

                                         

 

                          [facility]     [address] [id number]

[address] [place] [address], [place] [address]

                          25 Mar, 2014               

 

                          [facility]     [address] [id number]

[address] [place] [address], [place] [address]

                          24 Mar, 2014               

 

                          [facility]     [address] [id number]

[address] [place] [address], [place] [address]

                          24 Mar, 2014               

 

                          [facility]     [address] [id number]

[address] [place] [address], [place] [address]

                          13 Mar, 2014               

 

                          [facility]     [address] [id number]

[address] [place] [address], [place] [address]

                          13 Mar, 2014               

 

                          [facility]     [address] [id number]

[address] [place] [address], [place] [address]

                                         

 

                          [facility]     [address] [id number]

[address] [place] [address], [place] [address]

                                         

 

                          [facility]     [address] [id number]

[address] [place] [address], [place] [address]

                                         

 

                          [facility]     [address] [id number]

[address] [place] [address], [place] [address]

                                         

 

                          [facility]     [address] [id number]

[address] [place] [address], [place] [address]

                                         

 

                          [facility]     [address] [id number]

[address] [place] [address], [place] [address]

                                         

 

                          [facility]     [address] [id number]

[address] [place] [address], [place] [address]

                                         

 

                          [facility]     [address] [id number]

[address] [place] [address], [place] [address]

                          19 Dec, 2013               

 

                          [facility]     [address] [id number]

[address] [place] [address], [place] [address]

                          19 Dec, 2013               

 

                          [facility]     [address] [id number]

[address] [place] [address], [place] [address]

                                         

 

                          [facility]     [address] [id number]

[address] [place] [address], [place] [address]

                                         

 

                          [facility]     [address] [id number]

[address] [place] [address], [place] [address]

                                         

 

                          [facility]     [address] [id number]

[address] [place] [address], [place] [address]

                                         

 

                          [facility]     [address] [id number]

[address] [place] [address], [place] [address]

                          27 Sep, 2013               

 

                          [facility]     [address] [id number]

[address] [place] [address], [place] [address]

                          05 Sep, 2013               

 

                          [facility]     [address] [id number]

[address] [place] [address], [place] [address]

                                         

 

                          [facility]     [address] [id number]

[address] [place] [address], [place] [address]

                                         

 

                          [facility]     [address] [id number]

[address] [place] [address], [place] [address]

                                         

 

                          [facility]     [address] [id number]

[address] [place] [address], [place] [address]

                                         

 

                          [facility]     [address] [id number]

[address] [place] [address], [place] [address]

                          31 May, 2013               

 

                          [facility]     [address] [id number]

[facility] [place] [address]

                          06 May, 2013               

 

                          [facility]     [address] [id number]

[facility] [place] [address]

                          29 2013               

 

                          [facility]     [address] [id number]

[address] [place] [address], [place] [address]

                          28 Mar, 2013               

 

                          [facility]     [address] [id number]

[address] [place] [address], [place] [address]

                          18 Mar, 2013               

 

                          [facility]     [address] [id number]

[address] [place] [address], [place] [address]

                          14 Mar, 2013               

 

                          [facility]     [address] [id number]

[address] [place] [address], [place] [address]

                          13 Mar, 2013               

 

                          [facility]     [address] [id number]

[address] [place] [address], [place] [address]

                          12 Mar, 2013               

 

                          [facility]     [address] [id number]

[address] [place] [address], [place] [address]

                          11 Mar, 2013               

 

                          [facility]     [address] [id number]

[address] [place] [address], [place] [address]

                          06 Mar, 2013               

 

                          [facility]     [address] [id number]

[address] [place] [address], [place] [address]

                          06 Mar, 2013               

 

                          [facility]     [address] [id number]

[address] [place] [address], [place] [address]

                          06 Mar, 2013               

 

                          [facility]     [address] [id number]

[address] [place] [address], [place] [address]

                          06 Mar, 2013               

 

                          [facility]     [address] [id number]

[address] [place] [address], [place] [address]

                          05 Mar, 2013               

 

                          [facility]     [address] [id number]

[address] [place] [address], [place] [address]

                          06 2013               

 

                          [facility]     [address] [id number]

[address] [place] [address], [place] [address]

                          18 2013               

 

                          [facility]     [address] [id number]

[address] [place] [address], [place] [address]

                                         

 

                          [facility]     [address] [id number]

[address] [place] [address], [place] [address]

                                         

 

                          [facility]     [address] [id number]

[address] [place] [address], [place] [address]

                                         

 

                    [facility]     [address] [id number] [place] 

[place] [id number] 20 Aug, 2012

                                         

 

                          [facility]     [address] [id number]

[address] [place] [address], [place] [address]

                                         

 

                          [facility]     [address] [id number]

[address] [place] [address], [place] [address]

                                         

 

                          [facility]     [address] [id number]

[address] [place] [address], [place] [address]

                                         

 

                          [facility]     [address] [id number]

[address] [place] [address], [place] [address]

                                         

 

                          [facility]     [address] [id number]

[address] [place] [address], [place] [address]

                                         

 

                          [facility]     [address] [id number]

[address] [place] [address], [place] [address]

                                         

 

                          [facility]     [address] [id number]

[address] [place] [address], [place] [address]

                                         

 

                          [facility]     [address] [id number]

[address] [place] [address], [place] [address]

                                         

 

                          [facility]     [address] [id number]

[address] [place] [address], [place] [address]

                          26 Mar, 2012               

 

                          [facility]     [address] [id number]

[address] [place] [address], [place] [address]

                                         

 

                          [facility]     [address] [id number]

[address] [place] [address], [place] [address]

                                         

 

                          [facility]     [address] [id number]

[address] [place] [address], [place] [address]

                          12 Dec, 2011               

 

                          [facility]     [address] [id number]

[address] [place] [address], [place] [address]

                                         

 

                          [facility]     [address] [id number]

[address] [place] [address], [place] [address]

                                         

 

                          [facility]     [address] [id number]

[address] [place] [address], [place] [address]

                                         

 

                          [facility]     [address] [id number]

[address] [place] [address], [place] [address]

                                         

 

                          [facility]     [address] [id number]

[address] [place] [address], [place] [address]

                          31 Oct, 2011               

 

                          [facility]     [address] [id number]

[address] [place] [address], [place] [address]

                          17 Oct, 2011               

 

                          [facility]     [address] [id number]

[address] [place] [address], [place] [address]

                          17 Oct, 2011               

 

                          [facility]     [address] [id number]

[address] [place] [address], [place] [address]

                          10 Oct, 2011               

 

                          [facility]     [address] [id number]

[address] [place] [address], [place] [address]

                          22 Dec, 2010               

 

                          [facility]     [address] [id number]

[address] [place] [address], [place] [address]

                                         

 

                          [facility]     [address] [id number]

[address] [place] [address], [place] [address]

                                         

 

                          [facility]     [address] [id number]

[address] [place] [address], [place] [address]

                          19 May, 2010               

 

                          [facility]     [address] [id number]

[address] [place] [address], [place] [address]

                          17 May, 2010               

 

                          [facility]     [address] [id number]

[address] [place] [address], [place] [address]

                          13 May, 2010               

 

                          [facility]     [address] [id number]

[address] [place] [address], [place] [address]

                          11 May, 2010               

 

                          [facility]     [address] [id number]

[address] [place] [address], [place] [address]

                          10 May, 2010               

 

                          [facility] [place]     [address] [facility] [id number]

[address] [place] [address]

                          11 Dec, 2009               

 

                          [facility] [place]     [address] [facility] [id number]

[address] [place] [address]

                          11 Dec, 2009               

 

                          [facility] [place]     [address] [facility] [id number]

[address] [place] [address]

                                         

 

                          [facility] [place]     [address] [facility] [id number]

[address] [place] [address]

                                         







IMMUNIZATIONS

No Known Immunizations



SOCIAL HISTORY

Never Assessed



REASON FOR VISIT





PLAN OF CARE





VITAL SIGNS





MEDICATIONS

No Known Medications



RESULTS

No Results



PROCEDURES

No Known procedures



INSTRUCTIONS





MEDICATIONS ADMINISTERED

No Known Medications



MEDICAL (GENERAL) HISTORY





                    Type                Description         Date

 

                    Medical History     asthma-dx at age 11-12  

 

                          Medical History           hypertension-hx of pre-eclam

psia with second pregnancy, was 

induced at 35 weeks                      

 

                    Medical History     cardiomyopathy-postpartum (non-ischemic)

 (Stephanie)  

 

                    Medical History     depression           

 

                    Medical History     [facility]                  

 

                    Surgical History    tonsillectomy        

 

                    Surgical History    tubal               2014

 

                    Surgical History    tonsillectomy        

 

                          Surgical History          tubal [id number]

eloped respiratory issues and heart

failure following the surgery           2011

 

                    Surgical History    echo-2010, 8/19/10, , 11  

 

                          Hospitalization History   PPD #4 for non-ischemic card

iomyopathy, respiratory 

distress due to pulmonary edema, ARF    2010

 

                    Hospitalization History surgeries

## 2020-08-18 NOTE — XMS REPORT
[facility]

                             Created on: 2020



Love Chery

External Reference #: [id number]

: 1985

Sex: Female



Demographics





                          Address                   [address]

[place]  [address]

 

                          Preferred Language        Unknown

 

                          Marital Status            Unknown

 

                          Yazidism Affiliation     Unknown

 

                          Race                      Unknown

 

                          Ethnic Group              Unknown





Author





                          Author                    Love SHETH

 

                          [facility]

 

                          Address                   [address] [place]  [address]



 

                          Phone                     [phone]







Care Team Providers





                    Care Team Member Name Role                Phone

 

                    JACKIE SHETH        Unavailable         [phone]







PROBLEMS





          Type      Condition ICD9-CM Code DWH12-SW Code Onset Dates Condition S

tatus SNOMED 

Code

 

          Problem   Seasonal allergic rhinitis due to pollen           J30.1    

           Active    88649791

 

                          Problem                   Heart failure, unspecified H

F chronicity, unspecified heart failure type

                          I50.9                     Active       46967987

 

          Problem   Asthma              J45.909             Active    [id number]







ALLERGIES

No Information



ENCOUNTERS





                Encounter       Location        Date            Diagnosis

 

                          [facility] WALK IN [facility]  [address] [id number]

[address] [place] [address] 

[address]                05 Mar, 2020              Sore throat J02.9

 

                          [facility]     [address] [id number]

[address] [place] [address]

                          09 Sep, 2019               

 

                          [facility]     [address] [id number]

[address] [place] [address]

                                         

 

                          [facility]     [address] [id number]

[address] [place] [address]

                          17 May, 2019               

 

                          [facility]     [address] [id number]

[address] [place] [address]

                          14 May, 2019               

 

                          [facility]     [address] [id number]

[address] [place] [address]

                          28 Mar, 2019               

 

                          [facility]     [address] [id number]

[address] [place] [address]

                          06 Mar, 2019              Morbid obesity E66.01 and Lo

calized edema R60.0

 

                          [facility]     [address] [id number]

[address] [place] [address]

                                         

 

                          [facility]     [address] [address]

                          10 Charles, 2019               

 

                          [facility]     [address] [address]

                          10 Charles, 2019              Heart failure, unspecified H

F chronicity, unspecified heart failure

type I50.9

 

                          [facility]  [address] 

[address]                              Seasonal allergic rhinitis d

ue to pollen J30.1 and 

Asthma J45.909

 

                          [facility] WALK IN CARE  [address] 

[address]                              Seasonal allergic rhinitis d

ue to pollen J30.1

 

                          [facility] WALK IN [facility]  [address] 

[address]                19 Oct, 2016              Acute upper respiratory infe

ction, unspecified J06.9

 

                          [place] [address] [address]

                          05 Oct, 2016               

 

                          [facility] WALK IN [facility]  [address] 

[address]                13 May, 2016              Environmental allergies Z91.

09

 

                          [place] [address] [address]

                          24 Aug, 2015              Abscess 682.9

 

                          [place] [address] [address]

                          17 Aug, 2015              Mood disorder 296.90

 

                          [place] [address] [address]

                                        Screen for STD (sexually tra

nsmitted disease) V74.5

 

                          [place] [address] [address]

                          15 Jul, 2015               

 

                          [place] [address] [address]

                                        Depression 311

 

                          [place] [address] [address]

                                        Major depressive disorder, r

ecurrent episode, severe 296.33 and No 

condition on Axis II V71.09

 

                          [facility]     [address] [id number]

[address] [place] [address], [place] [address]

                          10 2015               

 

                          [facility]     [address] [id number]

[address] [place] [address], [place] [address]

                          14 2015               

 

                          [facility]     [address] [id number]

[address] [place] [address]

                          13 2015               

 

                          [facility]     [address] [id number]

[address] [place] [address], [place] [address]

                          16 Sep, 2014               

 

                          [facility]     [address] [id number]

[address] [place] [address], [place] [address]

                          16 Sep, 2014               

 

                          [facility]     [address] [id number]

[address] [place] [address]

                          15 Sep, 2014               

 

                          [facility]     [address] [id number]

[address] [place] [address], [place] [address]

                          15 Sep, 2014               

 

                          [facility]     [address] [id number]

[address] [place] [address]

                          12 Sep, 2014               

 

                          [facility]     [address] [id number]

[address] [place] [address], [place] [address]

                          12 Sep, 2014               

 

                          [facility]     [address] [id number]

[address] [place] [address]

                          06 Aug, 2014               

 

                          [facility]     [address] [id number]

[address] [place] [address]

                          06 Aug, 2014               

 

                          [facility]     [address] [id number]

[address] [place] [address]

                                         

 

                          [facility]     [address] [id number]

[address] [place] [address]

                                         

 

                          [facility]     [address] [id number]

[address] [place] [address]

                          10 Adam, 2014               

 

                          [facility]     [address] [id number]

[address] [place] [address]

                          10 Adam, 2014               

 

                          [facility]     [address] [id number]

[address] [place] [address], [place] [address]

                          07 May, 2014               

 

                          [facility]     [address] [id number]

[address] [place] [address], [place] [address]

                          07 May, 2014               

 

                          [facility]     [address] [id number]

[address] [place] [address], [place] [address]

                          05 May, 2014               

 

                          [facility]     [address] [id number]

[address] [place] [address], [place] [address]

                          05 May, 2014               

 

                          [facility]     [address] [id number]

[address] [place] [address], [place] [address]

                                         

 

                          [facility]     [address] [id number]

[address] [place] [address], [place] [address]

                                         

 

                          [facility]     [address] [id number]

[address] [place] [address], [place] [address]

                          25 Mar, 2014               

 

                          [facility]     [address] [id number]

[address] [place] [address], [place] [address]

                          24 Mar, 2014               

 

                          [facility]     [address] [id number]

[address] [place] [address], [place] [address]

                          24 Mar, 2014               

 

                          [facility]     [address] [id number]

[address] [place] [address], [place] [address]

                          13 Mar, 2014               

 

                          [facility]     [address] [id number]

[address] [place] [address], [place] [address]

                          13 Mar, 2014               

 

                          [facility]     [address] [id number]

[address] [place] [address], [place] [address]

                                         

 

                          [facility]     [address] [id number]

[address] [place] [address], [place] [address]

                                         

 

                          [facility]     [address] [id number]

[address] [place] [address], [place] [address]

                                         

 

                          [facility]     [address] [id number]

[address] [place] [address], [place] [address]

                                         

 

                          [facility]     [address] [id number]

[address] [place] [address], [place] [address]

                                         

 

                          [facility]     [address] [id number]

[address] [place] [address], [place] [address]

                                         

 

                          [facility]     [address] [id number]

[address] [place] [address], [place] [address]

                                         

 

                          [facility]     [address] [id number]

[address] [place] [address], [place] [address]

                          19 Dec, 2013               

 

                          [facility]     [address] [id number]

[address] [place] [address], [place] [address]

                          19 Dec, 2013               

 

                          [facility]     [address] [id number]

[address] [place] [address], [place] [address]

                                         

 

                          [facility]     [address] [id number]

[address] [place] [address], [place] [address]

                                         

 

                          [facility]     [address] [id number]

[address] [place] [address], [place] [address]

                                         

 

                          [facility]     [address] [id number]

[address] [place] [address], [place] [address]

                                         

 

                          [facility]     [address] [id number]

[address] [place] [address], [place] [address]

                          27 Sep, 2013               

 

                          [facility]     [address] [id number]

[address] [place] [address], [place] [address]

                          05 Sep, 2013               

 

                          [facility]     [address] [id number]

[address] [place] [address], [place] [address]

                                         

 

                          [facility]     [address] [id number]

[address] [place] [address], [place] [address]

                                         

 

                          [facility]     [address] [id number]

[address] [place] [address], [place] [address]

                                         

 

                          [facility]     [address] [id number]

[address] [place] [address], [place] [address]

                                         

 

                          [facility]     [address] [id number]

[address] [place] [address], [place] [address]

                          31 May, 2013               

 

                          [facility]     [address] [id number]

[facility] [place] [address]

                          06 May, 2013               

 

                          [facility]     [address] [id number]

[facility] [place] [address]

                          29 2013               

 

                          [facility]     [address] [id number]

[address] [place] [address], [place] [address]

                          28 Mar, 2013               

 

                          [facility]     [address] [id number]

[address] [place] [address], [place] [address]

                          18 Mar, 2013               

 

                          [facility]     [address] [id number]

[address] [place] [address], [place] [address]

                          14 Mar, 2013               

 

                          [facility]     [address] [id number]

[address] [place] [address], [place] [address]

                          13 Mar, 2013               

 

                          [facility]     [address] [id number]

[address] [place] [address], [place] [address]

                          12 Mar, 2013               

 

                          [facility]     [address] [id number]

[address] [place] [address], [place] [address]

                          11 Mar, 2013               

 

                          [facility]     [address] [id number]

[address] [place] [address], [place] [address]

                          06 Mar, 2013               

 

                          [facility]     [address] [id number]

[address] [place] [address], [place] [address]

                          06 Mar, 2013               

 

                          [facility]     [address] [id number]

[address] [place] [address], [place] [address]

                          06 Mar, 2013               

 

                          [facility]     [address] [id number]

[address] [place] [address], [place] [address]

                          06 Mar, 2013               

 

                          [facility]     [address] [id number]

[address] [place] [address], [place] [address]

                          05 Mar, 2013               

 

                          [facility]     [address] [id number]

[address] [place] [address], [place] [address]

                          06 2013               

 

                          [facility]     [address] [id number]

[address] [place] [address], [place] [address]

                          18 2013               

 

                          [facility]     [address] [id number]

[address] [place] [address], [place] [address]

                                         

 

                          [facility]     [address] [id number]

[address] [place] [address], [place] [address]

                                         

 

                          [facility]     [address] [id number]

[address] [place] [address], [place] [address]

                                         

 

                    [facility]     [address] [id number] [place], 

[place] [id number] 20 Aug, 2012

                                         

 

                          [facility]     [address] [id number]

[address] [place] [address], [place] [address]

                                         

 

                          [facility]     [address] [id number]

[address] [place] [address], [place] [address]

                                         

 

                          [facility]     [address] [id number]

[address] [place] [address], [place] [address]

                                         

 

                          [facility]     [address] [id number]

[address] [place] [address], [place] [address]

                                         

 

                          [facility]     [address] [id number]

[address] [place] [address], [place] [address]

                                         

 

                          [facility]     [address] [id number]

[address] [place] [address], [place] [address]

                                         

 

                          [facility]     [address] [id number]

[address] [place] [address], [place] [address]

                                         

 

                          [facility]     [address] [id number]

[address] [place] [address], [place] [address]

                                         

 

                          [facility]     [address] [id number]

[address] [place] [address], [place] [address]

                          26 Mar, 2012               

 

                          [facility]     [address] [id number]

[address] [place] [address], [place] [address]

                                         

 

                          [facility]     [address] [id number]

[address] [place] [address], [place] [address]

                                         

 

                          [facility]     [address] [id number]

[address] [place] [address], [place] [address]

                          12 Dec, 2011               

 

                          [facility]     [address] [id number]

[address] [place] [address], [place] [address]

                                         

 

                          [facility]     [address] [id number]

[address] [place] [address], [place] [address]

                                         

 

                          [facility]     [address] [id number]

[address] [place] [address], [place] [address]

                                         

 

                          [facility]     [address] [id number]

[address] [place] [address], [place] [address]

                                         

 

                          [facility]     [address] [id number]

[address] [place] [address], [place] [address]

                          31 Oct, 2011               

 

                          [facility]     [address] [id number]

[address] [place] [address], [place] [address]

                          17 Oct, 2011               

 

                          [facility]     [address] [id number]

[address] [place] [address], [place] [address]

                          17 Oct, 2011               

 

                          [facility]     [address] [id number]

[address] [place] [address], [place] [address]

                          10 Oct, 2011               

 

                          [facility]     [address] [id number]

[address] [place] [address], [place] [address]

                          22 Dec, 2010               

 

                          [facility]     [address] [id number]

[address] [place] [address], [place] [address]

                                         

 

                          [facility]     [address] [id number]

[address] [place] [address], [place] [address]

                                         

 

                          [facility]     [address] [id number]

[address] [place] [address], [place] [address]

                          19 May, 2010               

 

                          [facility]     [address] [id number]

[address] [place] [address], [place] [address]

                          17 May, 2010               

 

                          [facility]     [address] [id number]

[address] [place] [address], [place] [address]

                          13 May, 2010               

 

                          [facility]     [address] [id number]

[address] [place] [address], [place] [address]

                          11 May, 2010               

 

                          [facility]     [address] [id number]

[address] [place] [address], [place] [address]

                          10 May, 2010               

 

                          [facility]     [address] [id number]

[address] [place] [address]

                          11 Dec, 2009               

 

                          [facility]     [address] [id number]

[address] [place] [address]

                          11 Dec, 2009               

 

                          [facility]     [address] [id number]

[address] [place] [address]

                                         

 

                          [facility]     [address] [id number]

[address] [place] [id number]

                                         







IMMUNIZATIONS

No Known Immunizations



SOCIAL HISTORY

Never Assessed



REASON FOR VISIT





PLAN OF CARE





VITAL SIGNS





MEDICATIONS

No Known Medications



RESULTS

No Results



PROCEDURES

No Known procedures



INSTRUCTIONS





MEDICATIONS ADMINISTERED

No Known Medications



MEDICAL (GENERAL) HISTORY





                    Type                Description         Date

 

                    Medical History     asthma-dx at age 11-12  

 

                          Medical History           hypertension-hx of pre-eclam

psia with second pregnancy, was 

induced at 35 weeks                      

 

                    Medical History     cardiomyopathy-postpartum (non-ischemic)

 (Stephanie)  

 

                    Medical History     depression           

 

                    Medical History     [facility]                  

 

                    Surgical History    tonsillectomy        

 

                    Surgical History    tubal               2014

 

                    Surgical History    tonsillectomy        

 

                          Surgical History          tubal [id number]

eloped respiratory issues and heart

failure following the surgery           2011

 

                    Surgical History    echo-2010, 8/19/10, , 11  

 

                          Hospitalization History   PPD #4 for non-ischemic card

iomyopathy, respiratory 

distress due to pulmonary edema, ARF    2010

 

                    Hospitalization History surgeries

## 2020-08-18 NOTE — XMS REPORT
Continuity of Care Document

                             Created on: 2020



HERNAN REESE

External Reference #: [id number]

: 1985

Sex: Female



Demographics





                          Address                   [address] 

[place]  [address]

 

                          Home Phone                [phone] x

 

                          Preferred Language        Unknown

 

                          Marital Status            Unknown

 

                          Mandaeism Affiliation     Unknown

 

                          Race                      Unknown

 

                          Ethnic Group              Unknown





Author





                          Author                    [facility], HERNAN

 

                          Organization              [facility] [facility]

 

                          Address                   Unknown

 

                          Phone                     Unavailable



              



Allergies

      



             Active           Description           Code           Type         

  Severity   

                Reaction           Onset           Reported/Identified          

 

Relationship to Patient                 Clinical Status        

 

           Yes           Paxil                       Drug Allergy               

           

                                2009                                    

 

           Yes           Paxil                       Drug Allergy           N/A 

          

N/A                             2009                                    

 

             Yes           ANESTHETIC           ANESTHETIC                      

  Unknown     

             N/A                        2014                              

   

 

             Yes           paroxetine           [id number]           Drug Allerg

y           

Unknown           N/A                        2014                         

  

     



                        



Medications

      



There is no data.                  



Problems

      



             Date Dx Coded           Attending           Type           Code    

       

Diagnosis                               Diagnosed By        

 

             2008                                     780.79           Mal

aise And 

Fatigue                                          

 

             2008                                     V58.69           MED

ICATION HIGH 

RISK                                             

 

             2008           TRAVIS ROBLEDO MD                        780.

79           

Malaise And Fatigue                              

 

             2008           TRAVIS ROBLEDO MD                        V58.

69           

MEDICATION HIGH RISK                             

 

             2008                                     780.79           Mal

aise And 

Fatigue                                          

 

             2008                                     V58.69           MED

ICATION HIGH 

RISK                                             

 

             2008           JACKIE SHETH DO                        780.79

           

Malaise And Fatigue                              

 

             2008           JACKIE SHETH DO                        V58.69

           

MEDICATION HIGH RISK                             

 

                2008           RANGEL SPAIN A                       

    780.79          

                          Malaise And Fatigue                    

 

                2008           RANGEL SPAIN                       

    V58.69          

                          MEDICATION HIGH RISK                    

 

             2008           JACKIE SHETH DO                        780.79

           

Malaise And Fatigue                              

 

             2008           JACKIE SHETH DO                        V58.69

           

MEDICATION HIGH RISK                             

 

             2008           JACKIE SHETH DO                        780.79

           

Malaise And Fatigue                              

 

             2008           JACKIE SHETH DO                        V58.69

           

MEDICATION HIGH RISK                             

 

                2008           PEPE ROSARIO                  

         780.79     

                          Malaise And Fatigue                    

 

                2008           PEPE ROSARIO                  

         V58.69     

                          MEDICATION HIGH RISK                    

 

                2008           RANGEL SPAIN                       

    780.79          

                          Malaise And Fatigue                    

 

                2008           MENDOZA APRN, RANGEL A                       

    V58.69          

                          MEDICATION HIGH RISK                    

 

                2008           MENDOZA MCCULLOUGHN, RANGEL A                       

    780.79          

                          Malaise And Fatigue                    

 

                2008           MENDOZA MCCULLOUGHN, RANGEL A                       

    V58.69          

                          MEDICATION HIGH RISK                    

 

             2008           JACKIE SHETH DO K                        780.79

           

Malaise And Fatigue                              

 

             2008           JACKIE SHETH DO K                        V58.69

           

MEDICATION HIGH RISK                             

 

                2008           HIPOLITO ROSARIOIA R                  

         780.79     

                          Malaise And Fatigue                    

 

                2008           HIPOLITO ROSARIOIA R                  

         V58.69     

                          MEDICATION HIGH RISK                    

 

                2008           YEYO MARR T                       

    780.79          

                          Malaise And Fatigue                    

 

                2008           YEYO MARR                       

    V58.69          

                          MEDICATION HIGH RISK                    

 

                2008           YEYO MARR T                       

    780.79          

                          Malaise And Fatigue                    

 

                2008           YEYO MARR T                       

    V58.69          

                          MEDICATION HIGH RISK                    

 

             2009                                     656.13           Rh 

Negative Rhesus

Isoimmunization                                  

 

             2009                                     V22.1           Obst

etrical 

Services Antepartum Care Only                    

 

             2009           TRAVIS ROBLEDO MD                        656.

13           

Rh Negative Rhesus Isoimmunization                    

 

             2009           TRAVIS ROBLEDO MD                        V22.

1           

Obstetrical Services Antepartum Care Only                    

 

             2009                                     656.13           Rh 

Negative Rhesus

Isoimmunization                                  

 

             2009                                     V22.1           Obst

etrical 

Services Antepartum Care Only                    

 

             2009           JACKIE SHETH DO                        656.13

           Rh 

Negative Rhesus Isoimmunization                    

 

             2009           JACKIE SHETH DO                        V22.1 

          

Obstetrical Services Antepartum Care Only                    

 

                2009           MENDOZARANGEL BASS A                       

    656.13          

                          Rh Negative Rhesus Isoimmunization                    

 

             2009           MENDOZARANGEL BASS A                        V2

2.1           

Obstetrical Services Antepartum Care Only                    

 

             2009           JACKIE SHETH DO                        656.13

           Rh 

Negative Rhesus Isoimmunization                    

 

             2009           JACKIE SHETH DO                        V22.1 

          

Obstetrical Services Antepartum Care Only                    

 

             2009           JACKIE SHETH DO                        656.13

           Rh 

Negative Rhesus Isoimmunization                    

 

             2009           JACKIE SHETH DO                        V22.1 

          

Obstetrical Services Antepartum Care Only                    

 

                2009           PEPE ROSARIO R                  

         656.13     

                          Rh Negative Rhesus Isoimmunization                    

 

                2009           HIPOLITO ROSARIOIA R                  

         V22.1      

                          Obstetrical Services Antepartum Care Only             

       

 

                2009           MENDOZA MCCULLOUGHLUCIANA, RANGEL A                       

    656.13          

                          Rh Negative Rhesus Isoimmunization                    

 

             2009           MENDOZA MCCULLOUGHN, RANGEL A                        V2

2.1           

Obstetrical Services Antepartum Care Only                    

 

                2009           MENDOZA MCCULLOUGHLUCIANA, RANGEL A                       

    656.13          

                          Rh Negative Rhesus Isoimmunization                    

 

             2009           MENDOZA MCCULLOUGHLUCIANA, RANGEL A                        V2

2.1           

Obstetrical Services Antepartum Care Only                    

 

             2009           EDGARD SHETH DOA K                        656.13

           Rh 

Negative Rhesus Isoimmunization                    

 

             2009           SHETH DO JACKIE K                        V22.1 

          

Obstetrical Services Antepartum Care Only                    

 

                2009           PEPE ROSARIO R                  

         656.13     

                          Rh Negative Rhesus Isoimmunization                    

 

                2009           PEPE ROSARIO R                  

         V22.1      

                          Obstetrical Services Antepartum Care Only             

       

 

                2009           YEYO MARR T                       

    656.13          

                          Rh Negative Rhesus Isoimmunization                    

 

             2009           YEYO MARR                        V2

2.1           

Obstetrical Services Antepartum Care Only                    

 

                2009           YEYO MARR T                       

    656.13          

                          Rh Negative Rhesus Isoimmunization                    

 

             2009           YEYO MARR T                        V2

2.1           

Obstetrical Services Antepartum Care Only                    

 

             2009                                     V72.31           Gyn

 Exam, Routine 

                                                 

 

             2009                                     V74.5           Std 

Screen         

                                                 

 

             2009           TRAVIS ROBLEDO MD                        V72.

31           

Gyn Exam, Routine                                

 

             2009           TRAVIS ROBLEDO MD                        V74.

5           

Std Screen                                       

 

             2009                                     V72.31           Gyn

 Exam, Routine 

                                                 

 

             2009                                     V74.5           Std 

Screen         

                                                 

 

             2009           EDGARD SHETH DOA K                        V72.31

           Gyn

Exam, Routine                                    

 

             2009           SHETH DO JACKIE K                        V74.5 

          Std 

Screen                                           

 

                2009           MENDOZAANGELINE BASSIDI A                       

    V72.31          

                          Gyn Exam, Routine                    

 

             2009           MENDOZALUCIANA CRYSTAL RANGEL A                        V7

4.5           

Std Screen                                       

 

             2009           SHETH DO JACKIE K                        V72.31

           Gyn

Exam, Routine                                    

 

             2009           SHETH DO JACKIE K                        V74.5 

          Std 

Screen                                           

 

             2009           SHETH DO, JACKIE K                        V72.31

           Gyn

Exam, Routine                                    

 

             2009           SHETH DO, JACKIE K                        V74.5 

          Std 

Screen                                           

 

                2009           HIPOLITO ROSARIOIA R                  

         V72.31     

                          Gyn Exam, Routine                    

 

                2009           GONZALEZ APRN, PEPE R                  

         V74.5      

                          Std Screen                         

 

                2009           MENDOZA APRN, RANGEL A                       

    V72.31          

                          Gyn Exam, Routine                    

 

             2009           MENDOZA APRN, RANGEL A                        V7

4.5           

Std Screen                                       

 

                2009           MENDOZA APRN, RANGEL A                       

    V72.31          

                          Gyn Exam, Routine                    

 

             2009           MENDOZA APRN, RANGEL A                        V7

4.5           

Std Screen                                       

 

             2009           SHETH DO, JACKIE K                        V72.31

           Gyn

Exam, Routine                                    

 

             2009           SHETH DO, JACKIE K                        V74.5 

          Std 

Screen                                           

 

                2009           LISA CRYSTAL, PEPE R                  

         V72.31     

                          Gyn Exam, Routine                    

 

                2009           HIPOLITO ROSARIOIA R                  

         V74.5      

                          Std Screen                         

 

                2009           CARLOS CRYSTAL YEYO T                       

    V72.31          

                          Gyn Exam, Routine                    

 

             2009           CARLOS CRYSTAL, YEYO T                        V7

4.5           

Std Screen                                       

 

                2009           CARLOS CRYSTAL YEYO T                       

    V72.31          

                          Gyn Exam, Routine                    

 

             2009           CARLOS CRYSTAL, YEYO T                        V7

4.5           

Std Screen                                       

 

             2010                                     648.20           PRE

GNANCY 

COMPLICATIONS: ANEMIA                            

 

             2010           MEENA DEL CID, TRAVIS                        648.

20           

PREGNANCY COMPLICATIONS: ANEMIA                    

 

             2010                                     648.20           PRE

GNANCY 

COMPLICATIONS: ANEMIA                            

 

             2010           EDGARD SHETH DOA K                        648.20

           

PREGNANCY COMPLICATIONS: ANEMIA                    

 

                2010           ANGELINE SPAINIDI A                       

    648.20          

                          PREGNANCY COMPLICATIONS: ANEMIA                    

 

             2010           MERYL BAIRES JACKIE K                        648.20

           

PREGNANCY COMPLICATIONS: ANEMIA                    

 

             2010           EDGARD SHETH DOA K                        648.20

           

PREGNANCY COMPLICATIONS: ANEMIA                    

 

                2010           PEPE ROSARIO R                  

         648.20     

                          PREGNANCY COMPLICATIONS: ANEMIA                    

 

                2010           ANGELINE SPAINIDI A                       

    648.20          

                          PREGNANCY COMPLICATIONS: ANEMIA                    

 

                2010           ANGELINE SPAINIDI A                       

    648.20          

                          PREGNANCY COMPLICATIONS: ANEMIA                    

 

             2010           JACKIE SHETH DO K                        648.20

           

PREGNANCY COMPLICATIONS: ANEMIA                    

 

                2010           PEPE ROSARIO R                  

         648.20     

                          PREGNANCY COMPLICATIONS: ANEMIA                    

 

                2010           YEYO MARR                       

    648.20          

                          PREGNANCY COMPLICATIONS: ANEMIA                    

 

                2010           YEYO MARR                       

    648.20          

                          PREGNANCY COMPLICATIONS: ANEMIA                    

 

             05/10/2010                                     796.2           Elev

ated Blood 

Pressure Reading Without Diagnosis Of Hypertension                    

 

             05/10/2010           TRAVIS ROBLEDO MD                        796.

2           

Elevated Blood Pressure Reading Without Diagnosis Of Hypertension               

    

 

             05/10/2010                                     796.2           Elev

ated Blood 

Pressure Reading Without Diagnosis Of Hypertension                    

 

             05/10/2010           EDGARD SHETH DOA K                        796.2 

          

Elevated Blood Pressure Reading Without Diagnosis Of Hypertension               

    

 

             05/10/2010           RANGEL SPAIN A                        79

6.2           

Elevated Blood Pressure Reading Without Diagnosis Of Hypertension               

    

 

             05/10/2010           JACKIE SHETH DO K                        796.2 

          

Elevated Blood Pressure Reading Without Diagnosis Of Hypertension               

    

 

             05/10/2010           JACKIE SHETH DO K                        796.2 

          

Elevated Blood Pressure Reading Without Diagnosis Of Hypertension               

    

 

                05/10/2010           PEPE ROSARIO R                  

         796.2      

                          Elevated Blood Pressure Reading Without Diagnosis Of H

ypertension           

        

 

             05/10/2010           ANGELINE SPAINIDI A                        79

6.2           

Elevated Blood Pressure Reading Without Diagnosis Of Hypertension               

    

 

             05/10/2010           ANGELINE SPAINIDI A                        79

6.2           

Elevated Blood Pressure Reading Without Diagnosis Of Hypertension               

    

 

             05/10/2010           JACKIE SHETH DO K                        796.2 

          

Elevated Blood Pressure Reading Without Diagnosis Of Hypertension               

    

 

                05/10/2010           PEPE ROSARIO R                  

         796.2      

                          Elevated Blood Pressure Reading Without Diagnosis Of H

ypertension           

        

 

             05/10/2010           YEYO MARR                        79

6.2           

Elevated Blood Pressure Reading Without Diagnosis Of Hypertension               

    

 

             05/10/2010           YEYO MARR                        79

6.2           

Elevated Blood Pressure Reading Without Diagnosis Of Hypertension               

    

 

             2010                                     642.43           PRE

GNANCY TOXEMIA 

- ANTEPARTUM CONDITION OR PRIOR COMP DELIV                    

 

             2010           TRAVIS ROBLEDO MD                        642.

43           

PREGNANCY TOXEMIA - ANTEPARTUM CONDITION OR PRIOR COMP DELIV                    

 

             2010                                     642.43           PRE

GNANCY TOXEMIA 

- ANTEPARTUM CONDITION OR PRIOR COMP DELIV                    

 

             2010           JACKIE SHETH DO                        642.43

           

PREGNANCY TOXEMIA - ANTEPARTUM CONDITION OR PRIOR COMP DELIV                    

 

                2010           RANGEL SPAIN A                       

    642.43          

                          PREGNANCY TOXEMIA - ANTEPARTUM CONDITION OR PRIOR COMP

 DELIV                    

 

             2010           JACKIE SHETH DO                        642.43

           

PREGNANCY TOXEMIA - ANTEPARTUM CONDITION OR PRIOR COMP DELIV                    

 

             2010           JACKIE SHETH DO                        642.43

           

PREGNANCY TOXEMIA - ANTEPARTUM CONDITION OR PRIOR COMP DELIV                    

 

                2010           PEPE ROSARIO R                  

         642.43     

                          PREGNANCY TOXEMIA - ANTEPARTUM CONDITION OR PRIOR COMP

 DELIV               

    

 

                2010           RANGEL SPAIN A                       

    642.43          

                          PREGNANCY TOXEMIA - ANTEPARTUM CONDITION OR PRIOR COMP

 DELIV                    

 

                2010           RANGEL SPAIN A                       

    642.43          

                          PREGNANCY TOXEMIA - ANTEPARTUM CONDITION OR PRIOR COMP

 DELIV                    

 

             2010           JACKIE SHETH DO                        642.43

           

PREGNANCY TOXEMIA - ANTEPARTUM CONDITION OR PRIOR COMP DELIV                    

 

                2010           PEPE ROSARIO                  

         642.43     

                          PREGNANCY TOXEMIA - ANTEPARTUM CONDITION OR PRIOR COMP

 DELIV               

    

 

                2010           YEYO MARR                       

    642.43          

                          PREGNANCY TOXEMIA - ANTEPARTUM CONDITION OR PRIOR COMP

 DELIV                    

 

                2010           YEYO MARR                       

    642.43          

                          PREGNANCY TOXEMIA - ANTEPARTUM CONDITION OR PRIOR COMP

 DELIV                    

 

             2010                                     V25.49           Gyn

ecologic 

Service Prescrip Of Contracept Agent - Repeat Rx                    

 

             2010           TRAVIS ROBLEDO MD                        V25.

49           

Gynecologic Service Prescrip Of Contracept Agent - Repeat Rx                    

 

             2010                                     V25.49           Gyn

ecologic 

Service Prescrip Of Contracept Agent - Repeat Rx                    

 

             2010           JACKIE SHETH DO                        V25.49

           

Gynecologic Service Prescrip Of Contracept Agent - Repeat Rx                    

 

                2010           RANGEL SPAIN                       

    V25.49          

                          Gynecologic Service Prescrip Of Contracept Agent - Rep

eat Rx                    

 

             2010           JACKIE SHETH DO                        V25.49

           

Gynecologic Service Prescrip Of Contracept Agent - Repeat Rx                    

 

             2010           JACKIE SHETH DO                        V25.49

           

Gynecologic Service Prescrip Of Contracept Agent - Repeat Rx                    

 

                2010           PEPE ROSARIO                  

         V25.49     

                          Gynec[employer] Service Presc[employer] Of Contracept Agent - Rep

eat Rx               

    

 

                2010           RANGEL SPAIN                       

    V25.49          

                          Gynecologic Service Presc[employer] Of Contracept Agent - Rep

eat Rx                    

 

                2010           RANGEL SPAIN                       

    V25.49          

                          Gynecologic Service Prescrip Of Contracept Agent - Rep

eat Rx                    

 

             2010           JACKIE SHETH DO                        V25.49

           

Gynecologic Service Presc[employer] Of Contracept Agent - Repeat Rx                    

 

                2010           PEPE ROSARIO                  

         V25.49     

                          Gynecologic Service Prescrip Of Contracept Agent - Rep

eat Rx               

    

 

                2010           YEYO MARR                       

    V25.49          

                          Gynecologic Service Prescrip Of Contracept Agent - Rep

eat Rx                    

 

                2010           YEYO MARR                       

    V25.49          

                          Gynecologic Service Prescrip Of Contracept Agent - Rep

eat Rx                    

 

             2010                                     674.50           CAR

DIOMYOPATHY 

DILATED POSTPARTUM                               

 

             2010           TRAVIS ROBLEDO MD                        674.

50           

CARDIOMYOPATHY DILATED POSTPARTUM                    

 

             2010                                     674.50           CAR

DIOMYOPATHY 

DILATED POSTPARTUM                               

 

             2010           JACKIE SHETH DO                        674.50

           

CARDIOMYOPATHY DILATED POSTPARTUM                    

 

                2010           RANGEL SPAIN A                       

    674.50          

                          CARDIOMYOPATHY DILATED POSTPARTUM                    

 

             2010           JACKIE SHETH DO                        674.50

           

CARDIOMYOPATHY DILATED POSTPARTUM                    

 

             2010           JACKIE SHETH DO                        674.50

           

CARDIOMYOPATHY DILATED POSTPARTUM                    

 

                2010           PEPE ROSARIO                  

         674.50     

                          CARDIOMYOPATHY DILATED POSTPARTUM                    

 

                2010           RANGEL SPAIN A                       

    674.50          

                          CARDIOMYOPATHY DILATED POSTPARTUM                    

 

                2010           RANGEL SPAIN A                       

    674.50          

                          CARDIOMYOPATHY DILATED POSTPARTUM                    

 

             2010           JACKIE SHETH DO                        674.50

           

CARDIOMYOPATHY DILATED POSTPARTUM                    

 

                2010           PEPE ROSARIO                  

         674.50     

                          CARDIOMYOPATHY DILATED POSTPARTUM                    

 

                2010           YEYO MARR                       

    674.50          

                          CARDIOMYOPATHY DILATED POSTPARTUM                    

 

                2010           YEYO MARR                       

    674.50          

                          CARDIOMYOPATHY DILATED POSTPARTUM                    

 

           2010                       Ot           425.4                  

           

  

 

           2010                       Ot           458.29                 

           

   

 

           2010                       Ot           E941.3                 

           

   

 

           2010                       Ot           V58.69                 

           

   

 

             2010                                     V04.81           Vac

cines 

Prophylactic Need Against Influenza                    

 

             2010           TRAVIS ROBLEDO MD                        V04.

81           

Vaccines Prophylactic Need Against Influenza                    

 

             2010                                     V04.81           Vac

cines 

Prophylactic Need Against Influenza                    

 

             2010           SHETH DO, JACKIE K                        V04.81

           

Vaccines Prophylactic Need Against Influenza                    

 

                2010           MENDOZA APRN, RANGEL A                       

    V04.81          

                          Vaccines Prophylactic Need Against Influenza          

          

 

             2010           SHETH DO, JACKIE K                        V04.81

           

Vaccines Prophylactic Need Against Influenza                    

 

             2010           SHETH DO, JACKIE K                        V04.81

           

Vaccines Prophylactic Need Against Influenza                    

 

                2010           PEPE ROSARIO R                  

         V04.81     

                          Vaccines Prophylactic Need Against Influenza          

          

 

                2010           MENDOZA APRN, RANGEL A                       

    V04.81          

                          Vaccines Prophylactic Need Against Influenza          

          

 

                2010           MENDOZA APRN, RANGEL A                       

    V04.81          

                          Vaccines Prophylactic Need Against Influenza          

          

 

             2010           SHETH DO, JACKIE K                        V04.81

           

Vaccines Prophylactic Need Against Influenza                    

 

                2010           PEPE ROSARIO R                  

         V04.81     

                          Vaccines Prophylactic Need Against Influenza          

          

 

                2010           YEYO MARR                       

    V04.81          

                          Vaccines Prophylactic Need Against Influenza          

          

 

                2010           YEYO MARR                       

    V04.81          

                          Vaccines Prophylactic Need Against Influenza          

          

 

             2010                                     704.8           Foll

iculitis       

                                                 

 

             2010           TRAVIS ROBLEDO MD                        704.

8           

Folliculitis                                     

 

             2010                                     704.8           Foll

iculitis       

                                                 

 

             2010           EDGARD SHETH DOA K                        704.8 

          

Folliculitis                                     

 

             2010           MENDOZA APRN, RANGEL A                        70

4.8           

Folliculitis                                     

 

             2010           SHETH EDGARD BAIRESA K                        704.8 

          

Folliculitis                                     

 

             2010           EDGARD SHETH DOA K                        704.8 

          

Folliculitis                                     

 

                2010           PEPE ROSARIO R                  

         704.8      

                          Folliculitis                       

 

             2010           MENDOZA BONILLA, RANGEL A                        70

4.8           

Folliculitis                                     

 

             2010           MENDOZA BONILLA, RANGEL A                        70

4.8           

Folliculitis                                     

 

             2010           EDGARD SHETH DOA K                        704.8 

          

Folliculitis                                     

 

                2010           PEPE ROSARIO R                  

         704.8      

                          Folliculitis                       

 

             2010           YEYO MARR                        70

4.8           

Folliculitis                                     

 

             2010           YEYO MARR                        70

4.8           

Folliculitis                                     

 

             2010                                     214.9           NEOP

LASM - SOFT 

TISSUE TYPES ADIPOSE TISSUE LIPOMA                    

 

             2010           TRAVIS ROBLEDO MD                        214.

9           

NEOPLASM - SOFT TISSUE TYPES ADIPOSE TISSUE LIPOMA                    

 

             2010                                     214.9           NEOP

LASM - SOFT 

TISSUE TYPES ADIPOSE TISSUE LIPOMA                    

 

             2010           JACKIE SHETH DO K                        214.9 

          

NEOPLASM - SOFT TISSUE TYPES ADIPOSE TISSUE LIPOMA                    

 

             2010           MENDOZA APRN, RANGEL A                        21

4.9           

NEOPLASM - SOFT TISSUE TYPES ADIPOSE TISSUE LIPOMA                    

 

             2010           JACKIE SHETH DO K                        214.9 

          

NEOPLASM - SOFT TISSUE TYPES ADIPOSE TISSUE LIPOMA                    

 

             2010           JACKIE SHETH DO K                        214.9 

          

NEOPLASM - SOFT TISSUE TYPES ADIPOSE TISSUE LIPOMA                    

 

                2010           PEPE ROSARIO R                  

         214.9      

                          NEOPLASM - SOFT TISSUE TYPES ADIPOSE TISSUE LIPOMA    

                

 

             2010           MENDOZALUCIANA CRYSTAL RANGEL A                        21

4.9           

NEOPLASM - SOFT TISSUE TYPES ADIPOSE TISSUE LIPOMA                    

 

             2010           MENDOZALUCIANA CRYSTAL RANGEL A                        21

4.9           

NEOPLASM - SOFT TISSUE TYPES ADIPOSE TISSUE LIPOMA                    

 

             2010           JACKIE SHETH DO                        214.9 

          

NEOPLASM - SOFT TISSUE TYPES ADIPOSE TISSUE LIPOMA                    

 

                2010           PEPE ROSARIO R                  

         214.9      

                          NEOPLASM - SOFT TISSUE TYPES ADIPOSE TISSUE LIPOMA    

                

 

             2010           YEYO MARR                        21

4.9           

NEOPLASM - SOFT TISSUE TYPES ADIPOSE TISSUE LIPOMA                    

 

             2010           YEYO MARR                        21

4.9           

NEOPLASM - SOFT TISSUE TYPES ADIPOSE TISSUE LIPOMA                    

 

           2011                       Ot           786.50                 

           

   

 

           2011                       Ot           786.59                 

           

   

 

             2011                                     296.90           MOO

D DISORDER     

                                                 

 

             2011           TRAVIS ROBLEDO MD                        296.

90           

MOOD DISORDER                                    

 

             2011                                     296.90           MOO

D DISORDER     

                                                 

 

             2011           JACKIE SHETH DO                        296.90

           

MOOD DISORDER                                    

 

                2011           MENDOZAJUNE CRYSTAL RANGEL A                       

    296.90          

                          MOOD DISORDER                      

 

             2011           JACKIE SHETH DO K                        296.90

           

MOOD DISORDER                                    

 

             2011           EDGARD SHETH DOA K                        296.90

           

MOOD DISORDER                                    

 

                2011           PEPE ROSARIO R                  

         296.90     

                          MOOD DISORDER                      

 

                2011           MENDOZALUCIANA CRYSTAL RANGEL A                       

    296.90          

                          MOOD DISORDER                      

 

                2011           MENDOZALUCIANA CRYSTAL RANGEL A                       

    296.90          

                          MOOD DISORDER                      

 

             2011           SHETH DO JACKIE K                        296.90

           

MOOD DISORDER                                    

 

                2011           PEPE ROSARIO R                  

         296.90     

                          MOOD DISORDER                      

 

                2011           YEYO MARR                       

    296.90          

                          MOOD DISORDER                      

 

                2011           YEYO MARR                       

    296.90          

                          MOOD DISORDER                      

 

             07/15/2011                                     V72.41           PRE

GNANCY TEST 

NEGATIVE RESULT                                  

 

             07/15/2011           TRAVIS ROBLEDO MD                        V72.

41           

PREGNANCY TEST NEGATIVE RESULT                    

 

             07/15/2011                                     V72.41           PRE

GNANCY TEST 

NEGATIVE RESULT                                  

 

             07/15/2011           SHETH DO JACKIE K                        V72.41

           

PREGNANCY TEST NEGATIVE RESULT                    

 

                07/15/2011           MENDOZA CRYSTAL RANGEL A                       

    V72.41          

                          PREGNANCY TEST NEGATIVE RESULT                    

 

             07/15/2011           SHETH DO JACKIE K                        V72.41

           

PREGNANCY TEST NEGATIVE RESULT                    

 

             07/15/2011           SHETH DO JACKIE K                        V72.41

           

PREGNANCY TEST NEGATIVE RESULT                    

 

                07/15/2011           PEPE ROSARIO R                  

         V72.41     

                          PREGNANCY TEST NEGATIVE RESULT                    

 

                07/15/2011           ANGELINE SPAINIDI A                       

    V72.41          

                          PREGNANCY TEST NEGATIVE RESULT                    

 

                07/15/2011           ANGELINE SPAINIDI A                       

    V72.41          

                          PREGNANCY TEST NEGATIVE RESULT                    

 

             07/15/2011           SHETH DO JACKIE K                        V72.41

           

PREGNANCY TEST NEGATIVE RESULT                    

 

                07/15/2011           PEPE ROSARIO R                  

         V72.41     

                          PREGNANCY TEST NEGATIVE RESULT                    

 

                07/15/2011           YEYO MARR T                       

    V72.41          

                          PREGNANCY TEST NEGATIVE RESULT                    

 

                07/15/2011           YEYO MARR                       

    V72.41          

                          PREGNANCY TEST NEGATIVE RESULT                    

 

             10/17/2011                                     461.9           SINU

SITIS ACUTE    

                                                 

 

             10/17/2011           TRAVIS ROBLEDO MD                        461.

9           

SINUSITIS ACUTE                                  

 

             10/17/2011                                     461.9           SINU

SITIS ACUTE    

                                                 

 

             10/17/2011           SHETH DO JACKIE K                        461.9 

          

SINUSITIS ACUTE                                  

 

             10/17/2011           RANGEL SPAIN A                        46

1.9           

SINUSITIS ACUTE                                  

 

             10/17/2011           SHETH DO JACKIE K                        461.9 

          

SINUSITIS ACUTE                                  

 

             10/17/2011           SHETH DO JACKIE K                        461.9 

          

SINUSITIS ACUTE                                  

 

                10/17/2011           PEPE ROSARIO R                  

         461.9      

                          SINUSITIS ACUTE                    

 

             10/17/2011           RANGEL SPAIN A                        46

1.9           

SINUSITIS ACUTE                                  

 

             10/17/2011           RANGEL SPAIN A                        46

1.9           

SINUSITIS ACUTE                                  

 

             10/17/2011           JACKIE SHETH DO K                        461.9 

          

SINUSITIS ACUTE                                  

 

                10/17/2011           PEPE ROSARIO                  

         461.9      

                          SINUSITIS ACUTE                    

 

             10/17/2011           YEYO MARR                        46

1.9           

SINUSITIS ACUTE                                  

 

             10/17/2011           YEYO MARR                        46

1.9           

SINUSITIS ACUTE                                  

 

             10/31/2011                                     V25.2           [place]

l Ligation     

                                                 

 

             10/31/2011           TRAVIS ROBLEDO MD                        V25.

2           

Tubal Ligation                                   

 

             10/31/2011                                     V25.2           [place]

l Ligation     

                                                 

 

             10/31/2011           EDGARD SHETH DOA K                        V25.2 

          

Tubal Ligation                                   

 

             10/31/2011           RANGEL SPAIN A                        V2

5.2           

Tubal Ligation                                   

 

             10/31/2011           EDGARD SHETH DOA K                        V25.2 

          

Tubal Ligation                                   

 

             10/31/2011           EDGARD SHETH DOA K                        V25.2 

          

Tubal Ligation                                   

 

                10/31/2011           PEPE ROSARIO R                  

         V25.2      

                          Tubal Ligation                     

 

             10/31/2011           RANGEL SPAIN A                        V2

5.2           

Tubal Ligation                                   

 

             10/31/2011           ANGELINE SPAINIDI A                        V2

5.2           

Tubal Ligation                                   

 

             10/31/2011           JACKIE SHETH DO K                        V25.2 

          

Tubal Ligation                                   

 

                10/31/2011           PEPE ROSARIO R                  

         V25.2      

                          Tubal Ligation                     

 

             10/31/2011           YEYO MARR                        V2

5.2           

Tubal Ligation                                   

 

             10/31/2011           YEYO MARR                        V2

5.2           

Tubal Ligation                                   

 

           2011                       Ot           275.2                  

           

  

 

           2011                       Ot           276.8                  

           

  

 

           2011                       Ot           278.00                 

           

   

 

           2011                       Ot           288.60                 

           

   

 

           2011                       Ot           305.1                  

           

  

 

           2011                       Ot           327.23                 

           

   

 

           2011                       Ot           424.0                  

           

  

 

           2011                       Ot           428.0                  

           

  

 

           2011                       Ot           428.23                 

           

   

 

           2011                       Ot           493.90                 

           

   

 

           2011                       Ot           518.51                 

           

   

 

           2011                       Ot           790.29                 

           

   

 

           2011                       Ot           V15.81                 

           

   

 

           2011                       Ot           V85.41                 

           

   

 

             2011                                     V04.81           Vac

cines 

Prophylactic Need Against Influenza                    

 

             2011                                     V26.51           Vis

it For: Tubal 

Ligation Status                                  

 

             2011           TRAVIS ROBLEDO MD                        V04.

81           

Vaccines Prophylactic Need Against Influenza                    

 

             2011           TRAVIS ROBLEDO MD                        V26.

51           

Visit For: Tubal Ligation Status                    

 

             2011                                     V04.81           Vac

cines 

Prophylactic Need Against Influenza                    

 

             2011                                     V26.51           Vis

it For: Tubal 

Ligation Status                                  

 

             2011           MERYL BAIRES JACKIE K                        V04.81

           

Vaccines Prophylactic Need Against Influenza                    

 

             2011           EDGARD SHETH DOA K                        V26.51

           

Visit For: Tubal Ligation Status                    

 

                2011           MENDOZA APRN, RANGEL A                       

    V04.81          

                          Vaccines Prophylactic Need Against Influenza          

          

 

                2011           MENDOZA APRN, RANGEL A                       

    V26.51          

                          Visit For: Tubal Ligation Status                    

 

             2011           MERYL BAIRES JACKIE K                        V04.81

           

Vaccines Prophylactic Need Against Influenza                    

 

             2011           EDGARD SHETH DOA K                        V26.51

           

Visit For: Tubal Ligation Status                    

 

             2011           MERYL BAIRES JACKIE K                        V04.81

           

Vaccines Prophylactic Need Against Influenza                    

 

             2011           EDGARD SHETH DOA K                        V26.51

           

Visit For: Tubal Ligation Status                    

 

                2011           LISA CRYSTAL, PEPE R                  

         V04.81     

                          Vaccines Prophylactic Need Against Influenza          

          

 

                2011           LISA CRYSTAL PEPE R                  

         V26.51     

                          Visit For: Tubal Ligation Status                    

 

                2011           MENDOZA APRN, RANGEL A                       

    V04.81          

                          Vaccines Prophylactic Need Against Influenza          

          

 

                2011           MENDOZA APRN, RANGEL A                       

    V26.51          

                          Visit For: Tubal Ligation Status                    

 

                2011           MENDOZA APRN, RANGEL A                       

    V04.81          

                          Vaccines Prophylactic Need Against Influenza          

          

 

                2011           MENDOZA APRN, RANGEL A                       

    V26.51          

                          Visit For: Tubal Ligation Status                    

 

             2011           MERYL BAIRES JACKIE K                        V04.81

           

Vaccines Prophylactic Need Against Influenza                    

 

             2011           EDGARD SHETH DOA K                        V26.51

           

Visit For: Tubal Ligation Status                    

 

                2011           LISA CRYSTAL, PEPE R                  

         V04.81     

                          Vaccines Prophylactic Need Against Influenza          

          

 

                2011           ROSHNI ROSARIORICIA R                  

         V26.51     

                          Visit For: Tubal Ligation Status                    

 

                2011           YEYO MARR                       

    V04.81          

                          Vaccines Prophylactic Need Against Influenza          

          

 

                2011           YEYO MARR                       

    V26.51          

                          Visit For: Tubal Ligation Status                    

 

                2011           YEYO MARR                       

    V04.81          

                          Vaccines Prophylactic Need Against Influenza          

          

 

                2011           YEYO MARR                       

    V26.51          

                          Visit For: Tubal Ligation Status                    

 

           2012                       Ot           729.1                  

           

  

 

           2012                       Ot           786.50                 

           

   

 

           2012                       Ot           786.52                 

           

   

 

           2012                       Ot           787.03                 

           

   

 

           2012                       Ot           787.91                 

           

   

 

             2012                                     272.4           HYPE

RLIPIDEMIA     

                                                 

 

           2012                                   782.3           EDEMA   

           

     

 

             2012           TRAVIS ROBLEDO MD                        272.

4           

HYPERLIPIDEMIA                                   

 

             2012           TRAVIS ROBLEDO MD                        782.

3           

EDEMA                                            

 

             2012                                     272.4           HYPE

RLIPIDEMIA     

                                                 

 

           2012                                   782.3           Edema   

           

     

 

             2012           SHETH DO, JACKIE K                        272.4 

          

HYPERLIPIDEMIA                                   

 

             2012           SHETH DO, JACKIE K                        782.3 

          

Edema                                            

 

             2012           MENDOZA APRN, RANGEL A                        27

2.4           

HYPERLIPIDEMIA                                   

 

             2012           MENDOZA APRN, RANGEL A                        78

2.3           

Edema                                            

 

             2012           SHETH DO, JACKIE K                        272.4 

          

HYPERLIPIDEMIA                                   

 

             2012           SHETH DO, JACKIE K                        782.3 

          

Edema                                            

 

             2012           SHETH DO, JACKIE K                        272.4 

          

HYPERLIPIDEMIA                                   

 

             2012           SHETH DO, JACKIE K                        782.3 

          

Edema                                            

 

                2012           GONZALEZ APRN, PEPE R                  

         272.4      

                          HYPERLIPIDEMIA                     

 

                2012           LISA CRYSTAL, PEPE R                  

         782.3      

                          Edema                              

 

             2012           MENDOZA APRN, RANGEL A                        27

2.4           

HYPERLIPIDEMIA                                   

 

             2012           MENDOZA APRN, RANGEL A                        78

2.3           

Edema                                            

 

             2012           MENDOZA APRN, RANGEL A                        27

2.4           

HYPERLIPIDEMIA                                   

 

             2012           MENDOZA APRN, RANGEL A                        78

2.3           

Edema                                            

 

             2012           SHETH DO, JACKIE K                        272.4 

          

HYPERLIPIDEMIA                                   

 

             2012           SHETH DO, JACKIE K                        782.3 

          

Edema                                            

 

                2012           LISA CRYSTAL, PEPE R                  

         272.4      

                          HYPERLIPIDEMIA                     

 

                2012           LISA MCCULLOUGHN, PEPE R                  

         782.3      

                          Edema                              

 

             2012           YEYO MARR T                        27

2.4           

HYPERLIPIDEMIA                                   

 

             2012           YEYO MARR                        78

2.3           

Edema                                            

 

             2012           YEYO MARR                        27

2.4           

HYPERLIPIDEMIA                                   

 

             2012           YEYO MARR T                        78

2.3           

Edema                                            

 

             2012                                     268.9           SORIN

MIN D 

DEFICIENCY                                       

 

             2012           TRAVIS ROBLEDO MD                        268.

9           

VITAMIN D DEFICIENCY                             

 

             2012                                     268.9           SORIN

MIN D 

DEFICIENCY                                       

 

             2012           SHETH DO, JACKIE K                        268.9 

          

VITAMIN D DEFICIENCY                             

 

             2012           ANGELINE SPAINIDI A                        26

8.9           

VITAMIN D DEFICIENCY                             

 

             2012           SHETH DO, JACKIE K                        268.9 

          

VITAMIN D DEFICIENCY                             

 

             2012           SHETH DO, JACKIE K                        268.9 

          

VITAMIN D DEFICIENCY                             

 

                2012           ROSHNI ROSARIORICIA R                  

         268.9      

                          VITAMIN D DEFICIENCY                    

 

             2012           RANGEL SPAIN A                        26

8.9           

VITAMIN D DEFICIENCY                             

 

             2012           RANGEL SPAIN A                        26

8.9           

VITAMIN D DEFICIENCY                             

 

             2012           SHETH DO, JACKIE K                        268.9 

          

VITAMIN D DEFICIENCY                             

 

                2012           ROSHNI ROSARIORICIA R                  

         268.9      

                          VITAMIN D DEFICIENCY                    

 

             2012           YEYO MARR                        26

8.9           

VITAMIN D DEFICIENCY                             

 

             2012           YEYO MARR                        26

8.9           

VITAMIN D DEFICIENCY                             

 

             2012                                     110.5           DERM

ATOPHYTOSIS 

TINEA CORPORIS                                   

 

             2012                                     132.0           PEDI

CULOSIS CAPITIS

                                                 

 

             2012           TRAVIS ROBLEDO MD                        110.

5           

DERMATOPHYTOSIS TINEA CORPORIS                    

 

             2012           TRAVIS ROBLEDO MD                        132.

0           

PEDICULOSIS CAPITIS                              

 

             2012                                     110.5           Derm

atophytosis 

Tinea Corporis                                   

 

             2012                                     132.0           Pedi

culosis Capitis

                                                 

 

             2012           SHETH DO JACKIE K                        110.5 

          

Dermatophytosis Tinea Corporis                    

 

             2012           SHETH DO, JACKIE K                        132.0 

          

Pediculosis Capitis                              

 

             2012           RANGEL SPAIN A                        11

0.5           

Dermatophytosis Tinea Corporis                    

 

             2012           RANGEL SPAIN A                        13

2.0           

Pediculosis Capitis                              

 

             2012           SHETH DO, JACKIE K                        110.5 

          

Dermatophytosis Tinea Corporis                    

 

             2012           SHETH DO, JACKIE K                        132.0 

          

Pediculosis Capitis                              

 

             2012           SHETH DO, JACKIE K                        110.5 

          

Dermatophytosis Tinea Corporis                    

 

             2012           SHETH DO, JACKIE K                        132.0 

          

Pediculosis Capitis                              

 

                2012           ROSHNI ROSARIORICIA R                  

         110.5      

                          Dermatophytosis Tinea Corporis                    

 

                2012           GONZALEZ APRN, PEPE R                  

         132.0      

                          Pediculosis Capitis                    

 

             2012           MENDOZA APRN, RANGEL A                        11

0.5           

Dermatophytosis Tinea Corporis                    

 

             2012           MENDOZA APRN, RANGEL A                        13

2.0           

Pediculosis Capitis                              

 

             2012           MENDOZA APRN, RANGEL A                        11

0.5           

Dermatophytosis Tinea Corporis                    

 

             2012           MENDOZA APRN, RANGEL A                        13

2.0           

Pediculosis Capitis                              

 

             2012           SHETH DO, JACKIE K                        110.5 

          

Dermatophytosis Tinea Corporis                    

 

             2012           SHETH DO, JACKIE K                        132.0 

          

Pediculosis Capitis                              

 

                2012           ROSHNI ROSARIORICIA R                  

         110.5      

                          Dermatophytosis Tinea Corporis                    

 

                2012           LISA CRYSTAL PEPE R                  

         132.0      

                          Pediculosis Capitis                    

 

             2012           YEYO MARR                        11

0.5           

Dermatophytosis Tinea Corporis                    

 

             2012           YEYO MARR                        13

2.0           

Pediculosis Capitis                              

 

             2012           YEYO MARR                        11

0.5           

Dermatophytosis Tinea Corporis                    

 

             2012           YEYO MARR                        13

2.0           

Pediculosis Capitis                              

 

             2013                                     V04.81           FLU

 DX (3 YRS AND 

ABOVE, IM)                                       

 

             2013           SHETH DO, JACKIE K                        V04.81

           FLU

DX (3 YRS AND ABOVE, IM)                         

 

                2013           MENDOZA APRN, RANGEL A                       

    V04.81          

                          FLU DX (3 YRS AND ABOVE, IM)                    

 

             2013           SHETH DO JACKIE K                        V04.81

           FLU

DX (3 YRS AND ABOVE, IM)                         

 

             2013           SHETH DO JACKIE K                        V04.81

           FLU

DX (3 YRS AND ABOVE, IM)                         

 

                2013           HIPOLITO ROSARIOIA R                  

         V04.81     

                          FLU DX (3 YRS AND ABOVE, IM)                    

 

                2013           ANGELINE SPAINIDI A                       

    V04.81          

                          FLU DX (3 YRS AND ABOVE, IM)                    

 

                2013           MENDOZA MCCULLOUGHN, RANGEL A                       

    V04.81          

                          FLU DX (3 YRS AND ABOVE, IM)                    

 

             2013           MERYL BAIRES JACKIE K                        V04.81

           FLU

DX (3 YRS AND ABOVE, IM)                         

 

                2013           GONZALEZ APRHIPOLITO CHOWDHURYIA R                  

         V04.81     

                          FLU DX (3 YRS AND ABOVE, IM)                    

 

                2013           CARLOS CRYSTALYEYO T                       

    V04.81          

                          FLU DX (3 YRS AND ABOVE, IM)                    

 

                2013           CARLOS MCCULLOUGHYEYO CHOWDHURY                       

    V04.81          

                          FLU DX (3 YRS AND ABOVE, IM)                    

 

                2013           ANGELINE SPAINIDI A                       

    278.00          

                          OBESITY                            

 

             2013           ANGELINE SPAINIDI A                        30

5.1           

TOBACCO ABUSE                                    

 

             2013           ANGELINE SPAINIDI A                        V7

4.5           

STD SCREEN                                       

 

             2013           ANGELINE SPAINIDI A                        V7

6.2           

CERVICAL CANCER SCREENING (PAP SMEAR)                    

 

             2013           SHETH DO, JACKIE K                        278.00

           

OBESITY                                          

 

             2013           SHETH DO, JACKIE K                        305.1 

          

TOBACCO ABUSE                                    

 

             2013           SHETH DO, JACKIE K                        V74.5 

          STD 

SCREEN                                           

 

             2013           SHETH , JACKIE K                        V76.2 

          

CERVICAL CANCER SCREENING (PAP SMEAR)                    

 

             2013           SHETH DO, JACKIE K                        278.00

           

OBESITY                                          

 

             2013           SHETH DO, JACKIE K                        305.1 

          

TOBACCO ABUSE                                    

 

             2013           SHETH DO, JACKIE K                        V74.5 

          STD 

SCREEN                                           

 

             2013           SHETH DO, JACKIE K                        V76.2 

          

CERVICAL CANCER SCREENING (PAP SMEAR)                    

 

                2013           ROSHNI ROSARIORICIA R                  

         278.00     

                          OBESITY                            

 

                2013           ROSHNI ROSARIORICIA R                  

         305.1      

                          TOBACCO ABUSE                      

 

                2013           HIPOLITO ROSARIOIA R                  

         V74.5      

                          STD SCREEN                         

 

                2013           ROSHNI ROSARIORICIA R                  

         V76.2      

                          CERVICAL CANCER SCREENING (PAP SMEAR)                 

   

 

                2013           MENDOZA CRYSTAL, RANGEL A                       

    278.00          

                          OBESITY                            

 

             2013           MENDOZA CRYSTAL, RANGEL A                        30

5.1           

TOBACCO ABUSE                                    

 

             2013           MENDOZA CRYSTAL, RANGEL A                        V7

4.5           

STD SCREEN                                       

 

             2013           ANGELINE SPAINIDI A                        V7

6.2           

CERVICAL CANCER SCREENING (PAP SMEAR)                    

 

                2013           MENDOZA CRYSTAL, RANGEL A                       

    278.00          

                          OBESITY                            

 

             2013           MENDOZA MCCULLOUGHN, RANGEL A                        30

5.1           

TOBACCO ABUSE                                    

 

             2013           MENDOZA CRYSTAL, RANGEL A                        V7

4.5           

STD SCREEN                                       

 

             2013           ANGELINE SPAINIDI A                        V7

6.2           

CERVICAL CANCER SCREENING (PAP SMEAR)                    

 

             2013           MERYL BAIRES JACKIE K                        278.00

           

OBESITY                                          

 

             2013           MERYL BAIRES JACKIE K                        305.1 

          

TOBACCO ABUSE                                    

 

             2013           EDGARD SHETH DOA K                        V74.5 

          STD 

SCREEN                                           

 

             2013           MERYL BAIRES JACKIE K                        V76.2 

          

CERVICAL CANCER SCREENING (PAP SMEAR)                    

 

                2013           HIPOLITO ROSARIOIA R                  

         278.00     

                          OBESITY                            

 

                2013           HIPOLITO ROSARIOIA R                  

         305.1      

                          TOBACCO ABUSE                      

 

                2013           HIPOLITO ROSARIOIA R                  

         V74.5      

                          STD SCREEN                         

 

                2013           ROSHNI ROSARIORICIA R                  

         V76.2      

                          CERVICAL CANCER SCREENING (PAP SMEAR)                 

   

 

                2013           YEYO MARR                       

    278.00          

                          OBESITY                            

 

             2013           YEYO MARR                        30

5.1           

TOBACCO ABUSE                                    

 

             2013           YEYO MARR                        V7

4.5           

STD SCREEN                                       

 

             2013           YEYO MARR                        V7

6.2           

CERVICAL CANCER SCREENING (PAP SMEAR)                    

 

                2013           YEYO MARR T                       

    278.00          

                          OBESITY                            

 

             2013           YEYO MARR                        30

5.1           

TOBACCO ABUSE                                    

 

             2013           YEYO MARR                        V7

4.5           

STD SCREEN                                       

 

             2013           YEYO MARR                        V7

6.2           

CERVICAL CANCER SCREENING (PAP SMEAR)                    

 

             2013           EDGARD SHETH DOA K                        493.90

           

ASTHMA UNSPECIFIED                               

 

             2013           EDGARD SHETH DOA K                        V03.82

           

PPV23 (PNEUMOVAX) DX                             

 

             2013           SHETH DO JACKIE K                        V65.42

           

COUNSELING - SMOKING CESSATION                    

 

                2013           PEPE ROSARIO R                  

         493.90     

                          ASTHMA UNSPECIFIED                    

 

                2013           PEPE ROSARIO R                  

         V03.82     

                          PPV23 (PNEUMOVAX) DX                    

 

                2013           PEPE ROSARIO R                  

         V65.42     

                          COUNSELING - SMOKING CESSATION                    

 

                2013           MENDOZA APRN RANGEL A                       

    493.90          

                          ASTHMA UNSPECIFIED                    

 

                2013           MENDOZA APRN, RANGEL A                       

    V03.82          

                          PPV23 (PNEUMOVAX) DX                    

 

                2013           MENDOZA APRN, RANGEL A                       

    V65.42          

                          COUNSELING - SMOKING CESSATION                    

 

                2013           MENDOZA APRN, RANGEL A                       

    493.90          

                          ASTHMA UNSPECIFIED                    

 

                2013           MENDOZA APRN, RANGEL A                       

    V03.82          

                          PPV23 (PNEUMOVAX) DX                    

 

                2013           MENDOZA APRRANGEL CHOWDHURY A                       

    V65.42          

                          COUNSELING - SMOKING CESSATION                    

 

             2013           MERYL EDGARD BAIRESA K                        493.90

           

ASTHMA UNSPECIFIED                               

 

             2013           MERYL BAIRES JACKIE K                        V03.82

           

PPV23 (PNEUMOVAX) DX                             

 

             2013           SHETH DO JACKIE K                        V65.42

           

COUNSELING - SMOKING CESSATION                    

 

                2013           PEPE ROSARIO R                  

         493.90     

                          ASTHMA UNSPECIFIED                    

 

                2013           PEPE ROSARIO                  

         V03.82     

                          PPV23 (PNEUMOVAX) DX                    

 

                2013           PEPE ROSARIO                  

         V65.42     

                          COUNSELING - SMOKING CESSATION                    

 

                2013           YEYO MARR                       

    493.90          

                          ASTHMA UNSPECIFIED                    

 

                2013           YEYO MARR                       

    V03.82          

                          PPV23 (PNEUMOVAX) DX                    

 

                2013           YEYO MARR                       

    V65.42          

                          COUNSELING - SMOKING CESSATION                    

 

                2013           YEYO MARR                       

    493.90          

                          ASTHMA UNSPECIFIED                    

 

                2013           YEYO MARR                       

    V03.82          

                          PPV23 (PNEUMOVAX) DX                    

 

                2013           YEYO MARR                       

    V65.42          

                          COUNSELING - SMOKING CESSATION                    

 

                2014           PEPE ROSARIO                  

         727.49     

                          OTHER GANGLION AND CYST OF SYNOVIUM TENDON AND BURSA  

                  

 

                2014           PEPE ROSARIO R                  

         919.4      

                                        INSECT BITE NONVENOMOUS OF OTHER MULTIPL

E AND UNSPECIFIED SITES WITHOUT 

INFECTION                                        

 

                2014           RANGEL SPAIN A                       

    727.49          

                          OTHER GANGLION AND CYST OF SYNOVIUM TENDON AND BURSA  

                  

 

             2014           MENDOZARANGEL BASS A                        91

9.4           

INSECT BITE NONVENOMOUS OF OTHER MULTIPLE AND UNSPECIFIED SITES WITHOUT 
INFECTION                                        

 

                2014           RANGEL SPAIN A                       

    727.49          

                          OTHER GANGLION AND CYST OF SYNOVIUM TENDON AND BURSA  

                  

 

             2014           MENDOZARANGEL BASS A                        91

9.4           

INSECT BITE NONVENOMOUS OF OTHER MULTIPLE AND UNSPECIFIED SITES WITHOUT 
INFECTION                                        

 

             2014           JACKIE SHETH DO K                        727.49

           

OTHER GANGLION AND CYST OF SYNOVIUM TENDON AND BURSA                    

 

             2014           JACKIE SHETH DO K                        919.4 

          

INSECT BITE NONVENOMOUS OF OTHER MULTIPLE AND UNSPECIFIED SITES WITHOUT 
INFECTION                                        

 

                2014           PEPE ROSARIO R                  

         727.49     

                          OTHER GANGLION AND CYST OF SYNOVIUM TENDON AND BURSA  

                  

 

                2014           PEPE ROSARIO R                  

         919.4      

                                        INSECT BITE NONVENOMOUS OF OTHER MULTIPL

E AND UNSPECIFIED SITES WITHOUT 

INFECTION                                        

 

                2014           YEYO MARR                       

    727.49          

                          OTHER GANGLION AND CYST OF SYNOVIUM TENDON AND BURSA  

                  

 

             2014           YEYO MARR                        91

9.4           

INSECT BITE NONVENOMOUS OF OTHER MULTIPLE AND UNSPECIFIED SITES WITHOUT 
INFECTION                                        

 

                2014           YEYO MARR                       

    727.49          

                          OTHER GANGLION AND CYST OF SYNOVIUM TENDON AND BURSA  

                  

 

             2014           YEYO MARR                        91

9.4           

INSECT BITE NONVENOMOUS OF OTHER MULTIPLE AND UNSPECIFIED SITES WITHOUT 
INFECTION                                        

 

             2014           RANGEL SPAIN A                        59

9.0           

URINARY TRACT INFECTION                          

 

             2014           RANGEL SPAIN A                        59

9.0           

URINARY TRACT INFECTION                          

 

             2014           JACKIE SHETH DO K                        599.0 

          

URINARY TRACT INFECTION                          

 

                2014           PEPE ROSARIO R                  

         599.0      

                          URINARY TRACT INFECTION                    

 

             2014           YEYO MARR                        59

9.0           

URINARY TRACT INFECTION                          

 

             2014           YEYO MARR                        59

9.0           

URINARY TRACT INFECTION                          

 

             2014           ARLET HIDALGO DO           Ot           493.92 

          

ASTHMA, UNSPECIFIED, W (ACUTE) EXACERBAT                    

 

             2014           ARLET HIDALGO DO           Ot           786.50 

          

CHEST PAIN NOS                                   

 

                2014           LISA MCCULLOUGHLUCIANAPEPE R                  

         786.2      

                          COUGH                              

 

             2014           YEYO MARR T                        78

6.2           

COUGH                                            

 

             2014           YEYO MARR T                        78

6.2           

COUGH                                            

 

           2015                       Ot           428.0                  

           

  

 

           2015                       Ot           428.0                  

           

  

 

           2015                       Ot           397.0                  

           

  

 

           2015                       Ot           428.0                  

           

  

 

           2015                       Ot           397.0                  

           

  

 

           2015                       Ot           424.0                  

           

  

 

           2015                       Ot           428.0                  

           

  

 

           2015                       Ot           428.0                  

           

  

 

             2015           JOIE TSAI MD           Ot           397.

0            

                                                 

 

             2015           JOIE TSAI MD           Ot           424.

0            

                                                 

 

             2015           JOIE TSAI MD           Ot           428.

0            

                                                 

 

                2015           JASSON DEL CID, ADEN GUY           Ot        

      J40          

                          BRONCHITIS, NOT SPECIFIED AS ACUTE OR CH              

      

 

             01/10/2019           JOIE TSAI MD           Ot           397.

0           

TRICUSPID VALVE DISEASE                          

 

             01/10/2019           JOIE TSAI MD           Ot           424.

0           

MITRAL VALVE DISORDER                            

 

             01/10/2019           JOIE TSAI MD           Ot           428.

0           

CONGESTIVE HEART FAILURE NOS                     

 

             01/10/2019           JOIE TSAI MD           Ot           397.

0           

TRICUSPID VALVE DISEASE                          

 

             01/10/2019           JOIE TSAI MD           Ot           424.

0           

MITRAL VALVE DISORDER                            

 

             01/10/2019           JOIE TSAI MD           Ot           428.

0           

CONGESTIVE HEART FAILURE NOS                     

 

             2019           TIFFANY GILBERT MD           Ot           E66

.9           

OBESITY, UNSPECIFIED                             

 

             2019           TIFFANY GILBERT MD           Ot           E78

.5           

HYPERLIPIDEMIA, UNSPECIFIED                      

 

                2019           TIFFANY GILBERT MD           Ot           

   F17.210         

                          NICOTINE DEPENDENCE, CIGARETTES, UNCOMPL              

      

 

             2019           TIFFANY GILBERT MD           Ot           I11

.0           

HYPERTENSIVE HEART DISEASE WITH HEART FA                    

 

             2019           TIFFANY GILBERT MD           Ot           I42

.9           

CARDIOMYOPATHY, UNSPECIFIED                      

 

             2019           TIFFANY GILBERT MD           Ot           I49

.3           

VENTRICULAR PREMATURE DEPOLARIZATION                    

 

                2019           TIFFANY GILBERT MD, Ot           

   I50.23          

                          ACUTE ON CHRONIC SYSTOLIC (CONGESTIVE) H              

      

 

             2019           TIFFANY GILBERT MD           Ot           I95

.2           

HYPOTENSION DUE TO DRUGS                         

 

             2019           TIFFANY GILBERT MD, Ot           J18

.9           

PNEUMONIA, UNSPECIFIED ORGANISM                    

 

                2019           TIFFANY GILBERT MD, Ot           

   J45.909         

                          UNSPECIFIED ASTHMA, UNCOMPLICATED                    

 

                2019           TIFFANY GILBERT MD           Ot           

   K80.20          

                          CALCULUS OF GALLBLADDER W/O CHOLECYSTITI              

      

 

             2019           TIFFANY GILBERT MD           Ot           N39

.0           

URINARY TRACT INFECTION, SITE NOT SPECIF                    

 

                2019           TIFFANY GILBERT MD, Ot           

   Z68.41          

                          BODY MASS INDEX (BMI) 40.0-44.9, ADULT                

    

 

                2019           TIFFANY GILBERT MD           Ot           

   Z91.14          

                          PATIENT'S OTHER NONCOMPLIANCE WITH MEDIC              

      

 

             2019           JOIE TSAI MD           Ot           397.

0           

TRICUSPID VALVE DISEASE                          

 

             2019           JOIE TSAI MD           Ot           424.

0           

MITRAL VALVE DISORDER                            

 

             2019           JOIE TSAI MD J           Ot           428.

0           

CONGESTIVE HEART FAILURE NOS                     

 

             2019           JOIE TSAI MD J           Ot           397.

0           

TRICUSPID VALVE DISEASE                          

 

             2019           JOIE TSAI MD           Ot           424.

0           

MITRAL VALVE DISORDER                            

 

             2019           JOIE TSAI MD           Ot           428.

0           

CONGESTIVE HEART FAILURE NOS                     

 

             2019           JOIE TSAI MD           Ot           397.

0           

TRICUSPID VALVE DISEASE                          

 

             2019           JOIE TSAI MD           Ot           424.

0           

MITRAL VALVE DISORDER                            

 

             2019           JOIE TSAI MD           Ot           428.

0           

CONGESTIVE HEART FAILURE NOS                     

 

                2019           DHIRAJ SINGH           Ot      

        G47.10     

                          HYPERSOMNIA, UNSPECIFIED                    

 

                2019           DHIRAJ SINGH           Ot      

        I50.9      

                          HEART FAILURE, UNSPECIFIED                    

 

                2019           DHIRAJ SINGH           Ot      

        R94.30     

                          ABNORMAL RESULT OF CARDIOVASCULAR FUNCTI              

      

 

                2019           DHIRAJ SINGH APRN           Ot      

        Z72.0      

                          TOBACCO USE                        

 

             2019           JOIE TSAI MD           Ot           397.

0           

TRICUSPID VALVE DISEASE                          

 

             2019           JOIE TSAI MD           Ot           424.

0           

MITRAL VALVE DISORDER                            

 

             2019           JOIE TSAI MD           Ot           428.

0           

CONGESTIVE HEART FAILURE NOS                     

 

                2019           DHIRAJ SINGH APRN           Ot      

        G47.10     

                          HYPERSOMNIA, UNSPECIFIED                    

 

                2019           DHIRAJ SINGH APRN           Ot      

        I50.9      

                          HEART FAILURE, UNSPECIFIED                    

 

                2019           DHIRAJ SINGH APRN           Ot      

        R94.30     

                          ABNORMAL RESULT OF CARDIOVASCULAR FUNCTI              

      

 

                2019           DHIRAJ SINGH APRN           Ot      

        Z72.0      

                          TOBACCO USE                        

 

             2019           JOIE TSAI MD           Ot           397.

0           

TRICUSPID VALVE DISEASE                          

 

             2019           JOIE TSAI MD           Ot           424.

0           

MITRAL VALVE DISORDER                            

 

             2019           JOIE TSAI MD           Ot           428.

0           

CONGESTIVE HEART FAILURE NOS                     

 

                2019           DHIRAJ SINGH APRN           Ot      

        G47.10     

                          HYPERSOMNIA, UNSPECIFIED                    

 

                2019           DHIRAJ SINGH APRN           Ot      

        I50.9      

                          HEART FAILURE, UNSPECIFIED                    

 

                2019           DHIRAJ SINGH APRN           Ot      

        R94.30     

                          ABNORMAL RESULT OF CARDIOVASCULAR FUNCTI              

      

 

                2019           YOUSIF SINGHINE JANETH APRN           Ot      

        Z72.0      

                          TOBACCO USE                        

 

             2019           JOIE TSAI MD           Ot           397.

0           

TRICUSPID VALVE DISEASE                          

 

             2019           JOIE TSAI MD           Ot           424.

0           

MITRAL VALVE DISORDER                            

 

             2019           JOIE TSAI MD J           Ot           428.

0           

CONGESTIVE HEART FAILURE NOS                     

 

             2019           JOIE TSAI MD           Ot           397.

0           

TRICUSPID VALVE DISEASE                          

 

             2019           JOIE TSAI MD           Ot           424.

0           

MITRAL VALVE DISORDER                            

 

             2019           JOIE TSAI MD J           Ot           428.

0           

CONGESTIVE HEART FAILURE NOS                     

 

             2019           JOIE TSAI MD           Ot           I08.

1           

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                    

 

             2019           JOIE TSAI MD           Ot           I42.

8           

OTHER CARDIOMYOPATHIES                           

 

             2019           JOIE TSAI MD           Ot           I08.

1           

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                    

 

             2019           JOIE TSAI MD           Ot           I42.

8           

OTHER CARDIOMYOPATHIES                           

 

             2019           JOIE TSAI MD           Ot           I08.

1           

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                    

 

             2019           JOIE TSAI MD           Ot           I42.

8           

OTHER CARDIOMYOPATHIES                           

 

             2019           JOIE TSAI MD           Ot           I08.

1           

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                    

 

             2019           JOIE TSAI MD           Ot           I42.

8           

OTHER CARDIOMYOPATHIES                           

 

             2019                        Ot           642.43           MIL

D/NOS 

PREECLAMP-ANTEP                                  

 

             2020                        Ot           642.43           MIL

D/NOS 

PREECLAMP-ANTEP                                  

 

             2020                        Ot           642.43           MIL

D/NOS 

PREECLAMP-ANTEP                                  



                                                                                
                                                                                
                                                                                
                                                                                
                                                                                
                                                                                
                                                                                
                                                                                
                                                                                
                                                                                
                                                                                
                                                                                
                                                                                
                                                                   



Procedures

      



                Code            Description           Performed By           Per

formed On        

 

                                      07568                                 ROUT

INE VENIPUNCTURE          

                                                    2013        

 

                                      82333                                 LIVE

R PANEL (LFT)             

                                                    2013        

 

                                      96787                                 LIPI

D PANEL                   

                                                    2013        

 

                                      85672                                 SORIN

MIN D 25-HYDROXY (D2,D3, 

TOTAL)                                               2013        

 

                                      60754                                 TRIC

[facility] (IN-HOUSE)        

                                                    2013        

 

                                      84318                                 CULT

URE UROGENITAL            

                                                    2013        

 

                                      16239                                 GC/C

[facility] PROBE (STATE)        

                                                    2013        

 

                                [id number]                                 PAP SMEAR 

                    

                                        2013        

 

                                                                       PAP 

SMEAR OBTAIN SMEAR        

                                                    2013        

 

                                      [id number]                                 ROUT

INE VENIPUNCTURE          

                                                    2013        

 

                                77014                                 CMP       

                    

                                        2013        

 

                                      77637                                 LIPI

D PANEL                   

                                                    2013        

 

                                      7000644                                 GF

R CALC (RESULT ONLY)      

                                                    2013        

 

                                      44169                                 SORIN

MIN D 25-HYDROXY (D2,D3, 

TOTAL)                                               2013        

 

                                      1038F                                 PERS

ISTENT ASTHMA             

                                                    2013        

 

                                76918                                 ECHO 2D   

                    

                                        2013        

 

                                      97870                                 UA W

/ CULTURE IF INDICATED    

                                                    2014        

 

                                      19349                                 CULT

URE URINE                 

                                                    2014        

 

                                      [id number]                                 ROUT

INE VENIPUNCTURE          

                                                    09/15/2014        

 

                                      07298                                 SORIN

MIN D 25-HYDROXY (D2,D3, 

TOTAL)                                               09/15/2014        

 

                                68826                                 TSH       

                    

                                        09/15/2014        

 

                                63992                                 CMP       

                    

                                        09/15/2014        

 

                                      35869                                 LIPI

D PANEL                   

                                                    09/15/2014        

 

                                      [id number]                                 GF

R CALC (RESULT ONLY)      

                                                    09/15/2014        



                                                                



Results

      



                    Test                Result              Range        

 

                                        Complete urinalysis with reflex to cultu

re - 19 23:05         

 

                    Urine color determination           YELLOW              NRG 

       

 

                    Urine clarity determination           SL CLOUDY            N

RG        

 

                    Urine pH measurement by test strip           6              

     5-9        

 

                    Specific gravity of urine by test strip           1.015     

          1.016-1.022  

     

 

                    Urine protein assay by test strip, semi-quantitative        

   3+                  

NEGATIVE        

 

                    Urine glucose detection by automated test strip           NE

GATIVE            

NEGATIVE        

 

                          Erythrocytes detection in urine sediment by light micr

oscopy           5+       

                                        NEGATIVE        

 

                    Urine ketones detection by automated test strip           NE

GATIVE            

NEGATIVE        

 

                    Urine nitrite detection by test strip           NEGATIVE    

        NEGATIVE    

   

 

                    Urine total bilirubin detection by test strip           NEGA

TIVE            

NEGATIVE        

 

                          Urine urobilinogen measurement by automated test strip

 (mass/volume)           

NORMAL                                  NORMAL        

 

                    Urine leukocyte esterase detection by dipstick           2+ 

                 NEGATIVE 

      

 

                                        Automated urine sediment erythrocyte cou

nt by microscopy (number/high power 

field)                     [HPF]                    NRG        

 

                                        Automated urine sediment leukocyte count

 by microscopy (number/high power field)

                           [HPF]                    NRG        

 

                          Bacteria detection in urine sediment by light microsco

py           MODERATE     

                                        NRG        

 

                                        Squamous epithelial cells detection in u

rine sediment by light microscopy       

                          10-25                     NRG        

 

                          Crystals detection in urine sediment by light microsco

py           NONE         

                                        NRG        

 

                    Casts detection in urine sediment by light microscopy       

    NONE                

NRG        

 

                          Mucus detection in urine sediment by light microscopy 

          NEGATIVE        

                                        NRG        

 

                    Complete urinalysis with reflex to culture           YES    

             NRG        

 

                                        Bacterial urine culture - 19 23:05

         

 

                    Bacterial urine culture           SEE COMMEN            NRG 

       

 

                    COLONY COUNT           .                   NRG        

 

                                        Influenza virus A and B antigen detectio

n - 19 23:12         

 

                    FLU RESULT           NEGATIVE FOR INFLUENZA A AND B ANTIGENS

 BY IA            

NRG        

 

                                        Complete blood count (CBC) with automate

d white blood cell (WBC) differential - 

19 23:24         

 

                          Blood leukocytes automated count (number/volume)      

     9.4 10*3/uL          

                                        4.3-11.0        

 

                          Blood erythrocytes automated count (number/volume)    

       4.78 10*6/uL       

                                        4.35-5.85        

 

                    Venous blood hemoglobin measurement (mass/volume)           

12.0 g/dL           

11.5-16.0        

 

                    Blood hematocrit (volume fraction)           38 %           

     35-52        

 

                    Automated erythrocyte mean corpuscular volume           79 [

foz_us]           

80-99        

 

                                        Automated erythrocyte mean corpuscular h

emoglobin (mass per erythrocyte)        

                          25 pg                     25-34        

 

                                        Automated erythrocyte mean corpuscular h

emoglobin concentration measurement 

(mass/volume)             32 g/dL                   32-36        

 

                    Automated erythrocyte distribution width ratio           15.

9 %              10.0-

14.5        

 

                    Automated blood platelet count (count/volume)           236 

10*3/uL           

130-400        

 

                          Automated blood platelet mean volume measurement      

     12.1 [foz_us]        

                                        7.4-10.4        

 

                    Automated blood neutrophils/100 leukocytes           68 %   

             42-75       

 

 

                    Automated blood lymphocytes/100 leukocytes           22 %   

             12-44       

 

 

                    Blood monocytes/100 leukocytes           7 %                

 0-12        

 

                    Automated blood eosinophils/100 leukocytes           2 %    

             0-10        

 

                    Automated blood basophils/100 leukocytes           0 %      

           0-10        

 

                    Blood neutrophils automated count (number/volume)           

6.4 10*3            

1.8-7.8        

 

                    Blood lymphocytes automated count (number/volume)           

2.1 10*3            

1.0-4.0        

 

                    Blood monocytes automated count (number/volume)           0.

7 10*3            

0.0-1.0        

 

                    Automated eosinophil count           0.2 10*3/uL           0

.0-0.3        

 

                    Automated blood basophil count (count/volume)           0.0 

10*3/uL           

0.0-0.1        

 

                                        Fibrin D-dimer FEU measurement in platel

et poor plasma (mass/volume) - 19 

23:24         

 

                          Fibrin D-dimer FEU measurement in platelet poor plasma

 (mass/volume)           

2.36 ug/mL                              0.00-0.49        

 

                                        Comprehensive metabolic panel - 19

 23:24         

 

                          Serum or plasma sodium measurement (moles/volume)     

      139 mmol/L          

                                        135-145        

 

                          Serum or plasma potassium measurement (moles/volume)  

         3.9 mmol/L       

                                        3.6-5.0        

 

                          Serum or plasma chloride measurement (moles/volume)   

        107 mmol/L        

                                                

 

                    Carbon dioxide           23 mmol/L           21-32        

 

                          Serum or plasma anion gap determination (moles/volume)

           9 mmol/L       

                                        5-14        

 

                          Serum or plasma urea nitrogen measurement (mass/volume

)           17 mg/dL      

                                        7-18        

 

                          Serum or plasma creatinine measurement (mass/volume)  

         1.04 mg/dL       

                                        0.60-1.30        

 

                    Serum or plasma urea nitrogen/creatinine mass ratio         

  16                  NRG 

       

 

                                        Serum or plasma creatinine measurement w

ith calculation of estimated glomerular 

filtration rate           60                        NRG        

 

                    Serum or plasma glucose measurement (mass/volume)           

89 mg/dL            

        

 

                    Serum or plasma calcium measurement (mass/volume)           

9.0 mg/dL           

8.5-10.1        

 

                          Serum or plasma total bilirubin measurement (mass/volu

me)           0.9 mg/dL   

                                        0.1-1.0        

 

                                        Serum or plasma alkaline phosphatase cheryl

surement (enzymatic activity/volume)    

                          50 U/L                            

 

                                        Serum or plasma aspartate aminotransfera

se measurement (enzymatic 

activity/volume)           28 U/L                    5-34        

 

                                        Serum or plasma alanine aminotransferase

 measurement (enzymatic activity/volume)

                          29 U/L                    0-55        

 

                    Serum or plasma protein measurement (mass/volume)           

6.8 g/dL            

6.4-8.2        

 

                    Serum or plasma albumin measurement (mass/volume)           

3.8 g/dL            

3.2-4.5        

 

                    CALCIUM CORRECTED           9.2 mg/dL           8.5-10.1    

    

 

                                        Serum or plasma lithium measurement (mol

es/volume) - 19 23:24         

 

                    BNP level           1981.2 pg/mL           <100.0        

 

                                        Urine beta human chorionic gonadotropin 

(hCG) measurement - 19 23:41      

   

 

                          Urine beta human chorionic gonadotropin (hCG) measurem

ent           NEGATIVE    

                                        NEGATIVE        

 

                                        Lipid 1996 panel - 01/10/19 03:45       

  

 

                          Serum or plasma triglyceride measurement (mass/volume)

           77 mg/dL       

                                        <150        

 

                          Serum or plasma cholesterol measurement (mass/volume) 

          141 mg/dL       

                                        < 200        

 

                          Serum or plasma cholesterol in HDL measurement (mass/v

olume)           38 mg/dL 

                                        40-60        

 

                          Cholesterol in LDL [mass/volume] in serum or plasma by

 direct assay           

104 mg/dL                               1-129        

 

                          Serum or plasma cholesterol in VLDL measurement (mass/

volume)           15 mg/dL

                                        5-40        

 

                                        Whole blood basic metabolic panel -  13:10         

 

                          Serum or plasma sodium measurement (moles/volume)     

      139 mmol/L          

                                        135-145        

 

                          Serum or plasma potassium measurement (moles/volume)  

         4.1 mmol/L       

                                        3.6-5.0        

 

                          Serum or plasma chloride measurement (moles/volume)   

        103 mmol/L        

                                                

 

                    Carbon dioxide           26 mmol/L           21-32        

 

                          Serum or plasma anion gap determination (moles/volume)

           10 mmol/L      

                                        5-14        

 

                          Serum or plasma urea nitrogen measurement (mass/volume

)           20 mg/dL      

                                        7-18        

 

                          Serum or plasma creatinine measurement (mass/volume)  

         1.05 mg/dL       

                                        0.60-1.30        

 

                    Serum or plasma urea nitrogen/creatinine mass ratio         

  19                  NRG 

       

 

                                        Serum or plasma creatinine measurement w

ith calculation of estimated glomerular 

filtration rate           60                        NRG        

 

                    Serum or plasma glucose measurement (mass/volume)           

92 mg/dL            

        

 

                          Serum or plasma calcium measurement (mass/volume)     

      10.2 mg/dL          

                                        8.5-10.1        

 

                                        Magnesium - 19 13:10         

 

                    Magnesium           2.1 mg/dL           1.8-2.4        

 

                                        Whole blood basic metabolic panel -  05:32         

 

                          Serum or plasma sodium measurement (moles/volume)     

      138 mmol/L          

                                        135-145        

 

                          Serum or plasma potassium measurement (moles/volume)  

         3.8 mmol/L       

                                        3.6-5.0        

 

                          Serum or plasma chloride measurement (moles/volume)   

        103 mmol/L        

                                                

 

                    Carbon dioxide           23 mmol/L           21-32        

 

                          Serum or plasma anion gap determination (moles/volume)

           12 mmol/L      

                                        5-14        

 

                          Serum or plasma urea nitrogen measurement (mass/volume

)           19 mg/dL      

                                        7-18        

 

                          Serum or plasma creatinine measurement (mass/volume)  

         0.93 mg/dL       

                                        0.60-1.30        

 

                    Serum or plasma urea nitrogen/creatinine mass ratio         

  20                  NRG 

       

 

                                        Serum or plasma creatinine measurement w

ith calculation of estimated glomerular 

filtration rate           >                         NRG        

 

                    Serum or plasma glucose measurement (mass/volume)           

99 mg/dL            

        

 

                    Serum or plasma calcium measurement (mass/volume)           

9.1 mg/dL           

8.5-10.1        

 

                                        Magnesium - 19 05:32         

 

                    Magnesium           1.9 mg/dL           1.8-2.4        



                                        



Encounters

      



                ACCT No.           Visit Date/Time           Discharge          

 Status         

             Pt. Type           Provider           Facility           Loc./Unit 

          

Complaint        

 

                [id number]           09/15/2014 08:10:00           09/15/2014 23:59:

59           CLS

                Outpatient           [facility]                       

         

                                                 

 

                [id number]           2014 10:40:00           2014 23:59:

59           CLS

                Outpatient           [facility]                       

         

                                                 

 

                [id number]           2014 12:35:00           2014 23:59:

59           CLS

                Outpatient           PEPE ROSARIO                  

         

                                                 

 

                [id number]           2014 09:58:00           2014 23:59:

59           CLS

                Outpatient           JACKIE SHETH DO                           

         

                                                 

 

                [id number]           2014 10:07:00           2014 23:59:

59           CLS

                Outpatient           [facility]PAIN                       

         

                                                 

 

                [id number]           2014 10:07:00           2014 23:59:

59           CLS

                Outpatient           [facility]ANGEL SPAIN                       

         

                                                 

 

                [id number]           2014 14:45:00           2014 23:59:

59           CLS

                Outpatient           PEPE ROSARIO                  

         

                                                 

 

                [id number]           2013 09:33:00           2013 23:59:

59           CLS

                Outpatient           JACKIE SHETH DO                           

         

                                                 

 

                [id number]           2013 08:58:00           2013 23:59:

59           CLS

                Outpatient           JACKIE SHETH DO                           

         

                                                 

 

                [id number]           2013 08:56:00           2013 23:59:

59           CLS

                Outpatient           RANGEL SPAIN                       

         

                                                 

 

                [id number]           2013 11:12:00           2013 23:59:

59           CLS

                Outpatient           JACKIE SHETH DO                           

         

                                                 

 

                [id number]           2013 10:00:00           2013 23:59:

59           CLS

             Outpatient                                                         

  

 

                [id number]           2012 16:09:00           2012 23:59:

59           CLS

                Outpatient           TRAVIS ROBLEDO MD                         

         

                                                 

 

                [id number]           2012 16:09:00           2012 23:59:5

9           CLS 

             Outpatient                                                         

  

 

                [id number]           2020 14:10:00           2020 23:59:5

9           CLS 

                Outpatient           [facility]                       

    [facility]                               

 

                    [id number]           2020 13:09:00            14:38:00        

                DIS             Emergency           FELICITY MOTLEY       

    [facility]           ER                        DENTAL PAIN        

 

                    [id number]           2019 09:00:00            23:59:59        

                CLS             Outpatient           QUIN DEL CID, JOIE POSADA         

  [facility]           CARD                      CARDIOMYOPATHY        

 

                    [id number]           2019 20:43:00            06:35:00        

                DIS             Outpatient           DHIRAJ SINGH   

        [facility] 

[facility]           SLEEP                     CHF,TOBACCO USE

R,SLEEP 

DISORDER        

 

                    [id number]           2019 15:30:00            23:59:59        

                CLS             Preadmit           DHIRAJ SINGH     

      [facility] 

[facility]           RT                        CHF,TOBACCO USE

R,SLEEP 

DISORDER        

 

                    [id number]           2019 14:48:00            15:52:00        

                DIS             Inpatient           OFELIA DEL CID, TIFFANY CHOWDHURY         

  [facility]           4TH                       CHF EXACERBATION UTI   

     

 

                    [id number]           2015 09:25:00           

015 11:20:00        

                DIS             Emergency           ADEN SPAULDING MD      

     [facility]ia 

[facility]           ER                        COUGHING/CONGES

SANAM/DIFF 

BREATHING        

 

                    [id number]           2014 20:06:00           

014 22:02:00        

                DIS             Emergency           ROCKY DO, ARLET DRISS           Vi

a [facility]           ER                        CHEST PAIN        

 

                    [id number]           2013 10:06:00           

013 23:59:59        

                CLS             Outpatient           QUIN DEL CID, JOIE POSADA         

  [facility]           CARD                      CHF        

 

             [id number]           2012 08:59:00                         

            

Document Registration                                                           

         

 

             [id number]           2012 12:16:00                         

            

Document Registration                                                           

         

 

             [id number]           2011 19:13:00                         

            

Document Registration                                                           

         

 

             [id number]           2011 07:46:00                         

            

Document Registration                                                           

         

 

             [id number]           2011 09:59:00                         

            

Document Registration                                                           

         

 

             [id number]           2011 20:59:00                         

            

Document Registration                                                           

         

 

             [id number]           10/25/2010 09:42:00                         

            

Document Registration                                                           

         

 

             [id number]           2010 10:18:00                         

            

Document Registration                                                           

         

 

             [id number]           2010 14:41:00                         

            

Document Registration                                                           

         

 

             [id number]           2010 16:57:00                         

            

Document Registration                                                           

         

 

                [id number]           2019 18:40:00           2019 23:59:

59           CLS

                Outpatient           LUCIO SMITH                     

      

[facility]

## 2020-08-18 NOTE — XMS REPORT
[facility]

                             Created on: 2020



Love Chery

External Reference #: [id number]

: 1985

Sex: Female



Demographics





                          Address                   [address] [place]  [address]

 

                          Preferred Language        Unknown

 

                          Marital Status            Unknown

 

                          Sabianism Affiliation     Unknown

 

                          Race                      Unknown

 

                          Ethnic Group              Unknown





Author





                          Author                    Love SHETH

 

                          [facility]

 

                          Address                   [address] [place]  [address]



 

                          Phone                     [phone]







Care Team Providers





                    Care Team Member Name Role                Phone

 

                    MERYL  JACKIE        Unavailable         [phone]







PROBLEMS





          Type      Condition ICD9-CM Code XKL37-UZ Code Onset Dates Condition S

tatus SNOMED 

Code

 

          Problem   Seasonal allergic rhinitis due to pollen           J30.1    

           Active    81178151

 

                          Problem                   Heart failure, unspecified H

F chronicity, unspecified heart failure type

                          I50.9                     Active       53065887

 

          Problem   Asthma              J45.909             Active    100732921







ALLERGIES

No Information



ENCOUNTERS





                Encounter       Location        Date            Diagnosis

 

                          [facility]     [address] [facility] [id number]

[address] [place] [address]

                          10 Jul, 2020               

 

                          [facility]  [address] [id number]

[address] [place] [address] 

[address]                09 May, 2020              Dental abscess K04.7

 

                          [facility]  [address] [address] [id number]

[address] [place] [address] 

[address]                              Acute left-sided low back pa

in without sciatica M54.5

 

                          [facility]  [address] [facility] [id number]

[address] [place] [address] 

[address]                05 Mar, 2020              Sore throat J02.9

 

                          [facility]     [address] [id number]

[address] [place] [address]

                          09 Sep, 2019               

 

                          [facility]     [address] [id number]

[address] [place] [address]

                                         

 

                          [facility]     [address] [facility] [id number]

[address] [place] [address]

                          17 May, 2019               

 

                          [facility]     [address] [facility] [id number]

[address] [place] [address]

                          14 May, 2019               

 

                          [facility]     [address] [address]

                          28 Mar, 2019               

 

                          [facility]     [address] [address]

                          06 Mar, 2019              Morbid obesity E66.01 and Lo

calized edema R60.0

 

                          [facility]     [address] [address]

                                         

 

                          [facility]     [address] [address]

                          10 Charles, 2019               

 

                          [facility]     [address] [address]

                          10 Charles, 2019              Heart failure, unspecified H

F chronicity, unspecified heart failure

type I50.9

 

                          [facility]  [address] 

[address]                              Seasonal allergic rhinitis d

ue to pollen J30.1 and 

Asthma J45.909

 

                          [facility]  [address] 

[address]                              Seasonal allergic rhinitis d

ue to pollen J30.1

 

                          [facility]  [address] 

[address]                19 Oct, 2016              Acute upper respiratory infe

ction, unspecified J06.9

 

                          [facility]     [address] [address]

                          05 Oct, 2016               

 

                          [facility]  [address] 

[address]                13 May, 2016              Environmental allergies Z91.

09

 

                          [place] [address] [address]

                          24 Aug, 2015              Abscess 682.9

 

                          [facility]     [address] [address]

                          17 Aug, 2015              Mood disorder 296.90

 

                          [facility]     [address] [address]

                                        Screen for STD (sexually tra

nsmitted disease) V74.5

 

                          [facility]     [address] [id number]

[address] [place] [address]

                          15 2015               

 

                          [facility]     [address] [id number]

[address] [place] [address]

                          13 2015              Depression 311

 

                          [facility]     [address] [id number]

[address] [place] [address]

                                        Major depressive disorder, r

ecurrent episode, severe 296.33 and No 

condition on Axis II V71.09

 

                          [facility]     [address] [id number]

[address] [place] [address]

                          10 2015               

 

                          [facility]     [address] [id number]

[address] [place] [address]

                          14 2015               

 

                          [facility]     [address] [id number]

[address] [place] [address]

                                         

 

                          [facility]     [address] [id number]

[address] [place] [address]

                          16 Sep, 2014               

 

                          [facility]     [address] [id number]

[address] [place] [address]

                          16 Sep, 2014               

 

                          [facility]     [address] [id number]

[address] [place] [address]

                          15 Sep, 2014               

 

                          [facility]     [address] [id number]

[address] [place] [address]

                          15 Sep, 2014               

 

                          [facility]     [address] [id number]

[address] [place] [address]

                          12 Sep, 2014               

 

                          [facility]     [address] [id number]

[address] [place] [address]

                          12 Sep, 2014               

 

                          [facility]     [address] [id number]

[address] [place] [address]

                          06 Aug, 2014               

 

                          [facility]     [address] [facility] [id number]

[address] [place] [address]

                          06 Aug, 2014               

 

                          [facility]     [address] [id number]

[facility] [place] [address]

                                         

 

                          [facility]     [address] [id number]

[facility] [place] [address]

                                         

 

                          [facility]     [address] [id number]

[facility] [place] [address]

                          10 Adam, 2014               

 

                          [facility]     [address] [id number]

[address] [place] [address], [place] [address]

                          10 Adam, 2014               

 

                          [facility]     [address] [id number]

[facility] [place] [address]

                          07 May, 2014               

 

                          [facility]     [address] [id number]

[address] [place] [address], [place] [address]

                          07 May, 2014               

 

                          [facility]     [address] [id number]

[address] [place] [address], [place] [address]

                          05 May, 2014               

 

                          [facility]     [address] [id number]

[address] [place] [address], [place] [address]

                          05 May, 2014               

 

                          [facility]     [address] [id number]

[address] [place] [address], [place] [address]

                                         

 

                          [facility]     [address] [id number]

[address] [place] [address], [place] [address]

                                         

 

                          [facility]     [address] [id number]

[address] [place] [address], [place] [address]

                          25 Mar, 2014               

 

                          [facility]     [address] [id number]

[address] [place] [address], [place] [address]

                          24 Mar, 2014               

 

                          [facility]     [address] [id number]

[address] [place] [address], [place] [address]

                          24 Mar, 2014               

 

                          [facility]     [address] [id number]

[address] [place] [address], [place] [address]

                          13 Mar, 2014               

 

                          [facility]     [address] [id number]

[address] [place] [address], [place] [address]

                          13 Mar, 2014               

 

                          [facility]     [address] [id number]

[address] [place] [address], [place] [address]

                                         

 

                          [facility]     [address] [id number]

[address] [place] [address], [place] [address]

                                         

 

                          [facility]     [address] [id number]

[address] [place] [address], [place] [address]

                                         

 

                          [facility]     [address] [id number]

[address] [place] [address], [place] [address]

                                         

 

                          [facility]     [address] [id number]

[address] [place] [address], [place] [address]

                                         

 

                          [facility]     [address] [id number]

[address] [place] [address], [place] [address]

                                         

 

                          [facility]     [address] [id number]

[address] [place] [address], [place] [address]

                                         

 

                          [facility]     [address] [id number]

[address] [place] [address], [place] [address]

                          19 Dec, 2013               

 

                          [facility]     [address] [id number]

[address] [place] [address], [place] [address]

                          19 Dec, 2013               

 

                          [facility]     [address] [id number]

[address] [place] [address], [place] [address]

                                         

 

                          [facility]     [address] [id number]

[address] [place] [address], [place] [address]

                                         

 

                          [facility]     [address] [id number]

[address] [place] [address], [place] [address]

                                         

 

                          [facility]     [address] [id number]

[address] [place] [address]

                                         

 

                          [facility]     [address] [id number]

[address] [place] [address], [place] [address]

                          27 Sep, 2013               

 

                          [facility]     [address] [id number]

[address] [place] [address], [place] [address]

                          05 Sep, 2013               

 

                          [facility]     [address] [id number]

[address] [place] [address], [place] [address]

                                         

 

                          [facility]     [address] [id number]

[address] [place] [address], [place] [address]

                          12 2013               

 

                          [facility]     [address] [id number]

[address] [place] [address], [place] [address]

                          05 2013               

 

                          [facility]     [address] [id number]

[address] [place] [address], [place] [address]

                                         

 

                          [facility]     [address] [id number]

[address] [place] [address], [place] [address]

                          31 May, 2013               

 

                          [facility]     [address] [id number]

[address] [place] [address], [place] [address]

                          06 May, 2013               

 

                          [facility]     [address] [id number]

[address] [place] [address], [place] [address]

                                         

 

                          [facility]     [address] [id number]

[address] [place] [address], [place] [address]

                          28 Mar, 2013               

 

                          [facility]     [address] [id number]

[address] [place] [address], [place] [address]

                          18 Mar, 2013               

 

                          [facility]     [address] [id number]

[address] [place] [address], [place] [address]

                          14 Mar, 2013               

 

                          [facility]     [address] [id number]

[address] [place] [address], [place] [address]

                          13 Mar, 2013               

 

                          [facility]     [address] [id number]

[address] [place] [address], [place] [address]

                          12 Mar, 2013               

 

                          [facility]     [address] [id number]

[address] [place] [address], [place] [address]

                          11 Mar, 2013               

 

                          [facility]     [address] [id number]

[address] [place] [address], [place] [address]

                          06 Mar, 2013               

 

                          [facility]     [address] [id number]

[address] [place] [address], [place] [address]

                          06 Mar, 2013               

 

                          [facility]     [address] [id number]

[address] [place] [address], [place] [address]

                          06 Mar, 2013               

 

                          [facility]     [address] [id number]

[address] [place] [address], [place] [address]

                          06 Mar, 2013               

 

                          [facility]     [address] [id number]

[address] [place] [address], [place] [address]

                          05 Mar, 2013               

 

                          [facility]     [address] [id number]

[address] [place] [address], [place] [address]

                                         

 

                          [facility]     [address] [id number]

[address] [place] [address], [place] [address]

                                         

 

                          [facility]     [address] [id number]

[address] [place] [address], [place] [address]

                                         

 

                          [facility]     [address] [id number]

[address] [place] [address], [place] [address]

                                         

 

                          [facility]     [address] [id number]

[address] [place] [address], [place] [address]

                                         

 

                    [facility] [address] [id number] [place], 

[place] [id number] 20 Aug, 2012

                                         

 

                          [facility]     [address] [id number]

[address] [place] [address], [place] [address]

                                         

 

                          [facility]     [address] [id number]

[address] [place] [address], [place] [address]

                                         

 

                          [facility]     [address] [id number]

[address] [place] [address], [place] [address]

                                         

 

                          [facility]     [address] [id number]

[address] [place] [address], [place] [address]

                                         

 

                          [facility]     [address] [id number]

[address] [place] [address], [place] [address]

                                         

 

                          [facility]     [address] [id number]

[address] [place] [address], [place] [address]

                                         

 

                          [facility]     [address] [id number]

[address] [place] [address], [place] [address]

                                         

 

                          [facility]     [address] [id number]

[address] [place] [address], [place] [address]

                                         

 

                          [facility]     [address] [id number]

[address] [place] [address], [place] [address]

                          26 Mar, 2012               

 

                          [facility]     [address] [id number]

[address] [place] [address], [place] [address]

                                         

 

                          [facility]     [address] [id number]

[address] [place] [address], [place] [address]

                                         

 

                          [facility]     [address] [id number]

[address] [place] [address], [place] [address]

                          12 Dec, 2011               

 

                          [facility]     [address] [id number]

[address] [place] [address], [place] [address]

                                         

 

                          [facility]     [address] [id number]

[address] [place] [address], [place] [address]

                                         

 

                          [facility]     [address] [id number]

[address] [place] [address], [place] [address]

                                         

 

                          [facility]     [address] [id number]

[address] [place] [address], [place] [address]

                                         

 

                          [facility]     [address] [id number]

[address] [place] [address], [place] [address]

                          31 Oct, 2011               

 

                          [facility]     [address] [id number]

[address] [place] [address], [place] [address]

                          17 Oct, 2011               

 

                          [facility]     [address] [id number]

[address] [place] [address], [place] [address]

                          17 Oct, 2011               

 

                          [facility]     [address] [id number]

[address] [place] [address], [place] [address]

                          10 Oct, 2011               

 

                          [facility]     [address] [id number]

[address] [place] [address]

                          22 Dec, 2010               

 

                          [facility]     [address] [id number]

[address] [place] [address], [place] [address]

                                         

 

                          [facility]     [address] [id number]

[address] [place] [address], [place] [address]

                                         

 

                          [facility]     [address] [id number]

[address] [place] [address], [place] [address]

                          19 May, 2010               

 

                          [facility]     [address] [id number]

[address] [place] [address]

                          17 May, 2010               

 

                          [facility]     [address] [id number]

[address] [place] [address]

                          13 May, 2010               

 

                          [facility]     [address] [id number]

[address] [place] [address]

                          11 May, 2010               

 

                          [facility]     [address] [id number]

[address] [place] [address]

                          10 May, 2010               

 

                          [facility]     [address] [id number]

[address] [place] [id number]

                          11 Dec, 2009               

 

                          [facility]     [address] [id number]

[address] [place] [id number]

                          11 Dec, 2009               

 

                          [facility]     [address] [facility] [id number]

[address] [place] [phone]

                                         

 

                          [facility]     [address] [facility] [id number]

[address] [place] [phone]

                                         







IMMUNIZATIONS

No Known Immunizations



SOCIAL HISTORY

Never Assessed



REASON FOR VISIT





PLAN OF CARE





VITAL SIGNS





MEDICATIONS

Unknown Medications



RESULTS

No Results



PROCEDURES





                Procedure       Date Ordered    Result          Body Site

 

                LIPID PANEL     May 31, 2013                     

 

                COMPREHEN METABOLIC PANEL May 31, 2013                     

 

                ASSAY OF VITAMIN D May 31, 2013                     

 

                VENTERESAUNCT, ROUTINE* May 31, 2013                     







INSTRUCTIONS





MEDICATIONS ADMINISTERED

No Known Medications



MEDICAL (GENERAL) HISTORY





                    Type                Description         Date

 

                    Medical History     asthma-dx at age 11-12  

 

                          Medical History           hypertension-hx of pre-eclam

psia with second pregnancy, was 

induced at 35 weeks                      

 

                    Medical History     cardiomyopathy-postpartum (non-ischemic)

 (Stephanie)  

 

                    Medical History     depression           

 

                    Medical History     [facility]                  

 

                    Surgical History    tonsillectomy        

 

                    Surgical History    tubal               2014

 

                    Surgical History    tonsillectomy        

 

                          Surgical History          tubal [id number] dev

eloped respiratory issues and heart

failure following the surgery           2011

 

                    Surgical History    echo-2010, 8/19/10, , 11  

 

                          Hospitalization History   PPD #4 for non-ischemic card

iomyopathy, respiratory 

distress due to pulmonary edema, ARF    2010

 

                    Hospitalization History surgeries

## 2020-08-18 NOTE — XMS REPORT
[facility]

                             Created on: 2020



Love Chery

External Reference #: [id number]

: 1985

Sex: Female



Demographics





                          Address                   [address] [place]  [address]

 

                          Preferred Language        Unknown

 

                          Marital Status            Unknown

 

                          Denominational Affiliation     Unknown

 

                          Race                      Unknown

 

                          Ethnic Group              Unknown





Author





                          Author                    Love Short

 

                          Organization              [facility]

 

                          Address                   [address] [place]  [address]



 

                          Phone                     [phone]







Care Team Providers





                    Care Team Member Name Role                Phone

 

                    RANGEL Short    Unavailable         [phone]







PROBLEMS





          Type      Condition ICD9-CM Code UDO66-DE Code Onset Dates Condition S

tatus SNOMED 

Code

 

          Problem   Seasonal allergic rhinitis due to pollen           J30.1    

           Active    [id number]

 

                          Problem                   Heart failure, unspecified H

F chronicity, unspecified heart failure type

                          I50.9                     Active       09695788

 

          Problem   Asthma              J45.909             Active    [id number]







ALLERGIES

No Information



ENCOUNTERS





                Encounter       Location        Date            Diagnosis

 

                          [facility]     [address] [facility] [id number]

[address] [place] [address]

                          10 Jul, 2020               

 

                          [facility]  [address] [facility] [id number]

[address] [place] [address] 

[address]                09 May, 2020              Dental abscess K04.7

 

                          [facility]  [address] [facility] [id number]

[address] [place] [address] 

[address]                              Acute left-sided low back pa

in without sciatica M54.5

 

                          [facility]  [address] [facility] [id number]

[address] [place] [address] 

[address]                05 Mar, 2020              Sore throat J02.9

 

                          [facility]     [address] [facility] [id number]

[address] [place] [address]

                          09 Sep, 2019               

 

                          [facility]     [address] [facility] [id number]

[address] [place] [address]

                                         

 

                          [facility]     [address] [facility] [id number]

[address] [place] [address]

                          17 May, 2019               

 

                          [facility]     [address] [facility] [id number]

[address] [place] [address]

                          14 May, 2019               

 

                          [facility]     [address] [place] [id number]

[address] [place] [address]

                          28 Mar, 2019               

 

                          [facility]     [address] [address]

                          06 Mar, 2019              Morbid obesity E66.01 and Lo

calized edema R60.0

 

                          [facility]     [address] [address]

                                         

 

                          [facility]     [address] [address]

                          10 Charles, 2019               

 

                          [facility]     [address] [address]

                          10 Charles, 2019              Heart failure, unspecified H

F chronicity, unspecified heart failure

type I50.9

 

                          [facility]  [address] 

[address]                              Seasonal allergic rhinitis d

ue to pollen J30.1 and 

Asthma J45.909

 

                          [facility]  [address] 

[address]                              Seasonal allergic rhinitis d

ue to pollen J30.1

 

                          [facility]  [address] 

[address]                19 Oct, 2016              Acute upper respiratory infe

ction, unspecified J06.9

 

                          [facility]     [address] [address]

                          05 Oct, 2016               

 

                          [facility]  [address] 

[address]                13 May, 2016              Environmental allergies Z91.

09

 

                          [place] [address] [address]

                          24 Aug, 2015              Abscess 682.9

 

                          [facility]     [address] [address]

                          17 Aug, 2015              Mood disorder 296.90

 

                          [facility]     [address] [address]

                                        Screen for STD (sexually tra

nsmitted disease) V74.5

 

                          [facility]     [address] [id number]

[address] [place] [address]

                          15 2015               

 

                          [facility]     [address] [id number]

[address] [place] [address]

                          13 2015              Depression 311

 

                          [facility]     [address] [id number]

[address] [place] [address]

                          13 2015              Major depressive disorder, r

ecurrent episode, severe 296.33 and No 

condition on Axis II V71.09

 

                          [facility]     [address] [id number]

[address] [place] [address]

                          10 2015               

 

                          [facility]     [address] [id number]

[address] [place] [address]

                          14 2015               

 

                          [facility]     [address] [id number]

[address] [place] [address]

                                         

 

                          [facility]     [address] [id number]

[address] [place] [address]

                          16 Sep, 2014               

 

                          [facility]     [address] [id number]

[address] [place] [address]

                          16 Sep, 2014               

 

                          [facility]     [address] [id number]

[address] [place] [address]

                          15 Sep, 2014               

 

                          [facility]     [address] [id number]

[address] [place] [address]

                          15 Sep, 2014               

 

                          [facility]     [address] [id number]

[address] [place] [address]

                          12 Sep, 2014               

 

                          [facility]     [address] [id number]

[address] [place] [address]

                          12 Sep, 2014               

 

                          [facility]     [address] [id number]

[address] [place] [address]

                          06 Aug, 2014               

 

                          [facility]     [address] [id number]

[address] [place] [address]

                          06 Aug, 2014               

 

                          [facility]     [address] [id number]

[address] [place] [address]

                                         

 

                          [facility]     [address] [id number]

[address] [place] [address], [place] [address]

                                         

 

                          [facility]     [address] [id number]

[address] [place] [address], [place] [address]

                          10 Adam, 2014               

 

                          [facility]     [address] [id number]

[address] [place] [address], [place] [address]

                          10 Adam, 2014               

 

                          [facility]     [address] [id number]

[address] [place] [address], [place] [address]

                          07 May, 2014               

 

                          [facility]     [address] [id number]

[address] [place] [address], [place] [address]

                          07 May, 2014               

 

                          [facility]     [address] [id number]

[address] [place] [address], [place] [address]

                          05 May, 2014               

 

                          [facility]     [address] [id number]

[address] [place] [address], [place] [address]

                          05 May, 2014               

 

                          [facility]     [address] [id number]

[address] [place] [address], [place] [address]

                                         

 

                          [facility]     [address] [id number]

[address] [place] [address], [place] [address]

                                         

 

                          [facility]     [address] [id number]

[address] [place] [address], [place] [address]

                          25 Mar, 2014               

 

                          [facility]     [address] [id number]

[address] [place] [address], [place] [address]

                          24 Mar, 2014               

 

                          [facility]     [address] [id number]

[address] [place] [address], [place] [address]

                          24 Mar, 2014               

 

                          [facility]     [address] [id number]

[address] [place] [address], [place] [address]

                          13 Mar, 2014               

 

                          [facility]     [address] [id number]

[address] [place] [address], [place] [address]

                          13 Mar, 2014               

 

                          [facility]     [address] [id number]

[address] [address]

                                         

 

                          [facility]     [address] [id number]

[address] [place] [address], [place] [address]

                                         

 

                          [facility]     [address] [id number]

[address] [place] [address], [place] [address]

                                         

 

                          [facility]     [address] [id number]

[address] [place] [address], [place] [address]

                                         

 

                          [facility]     [address] [id number]

[address] [place] [address], [place] [address]

                                         

 

                          [facility]     [address] [id number]

[address] [place] [address], [place] [address]

                                         

 

                          [facility]     [address] [id number]

[address] [place] [address], [place] [address]

                                         

 

                          [facility]     [address] [id number]

[address] [place] [address], [place] [address]

                          19 Dec, 2013               

 

                          [facility]     [address] [id number]

[address] [place] [address], [place] [address]

                          19 Dec, 2013               

 

                          [facility]     [address] [id number]

[address] [place] [address], [place] [address]

                                         

 

                          [facility]     [address] [id number]

[address] [place] [address], [place] [address]

                                         

 

                          [facility]     [address] [id number]

[address] [place] [address], [place] [address]

                                         

 

                          [facility]     [address] [id number]

[address] [place] [address]

                                         

 

                          [facility]     [address] [id number]

[address] [place] [address], [place] [address]

                          27 Sep, 2013               

 

                          [facility]     [address] [id number]

[address] [place] [address], [place] [address]

                          05 Sep, 2013               

 

                          [facility]     [address] [id number]

[address] [place] [address], [place] [address]

                                         

 

                          [facility]     [address] [id number]

[facility] [place] [address]

                                         

 

                          [facility]     [address] [id number]

[address] [place] [address], [place] [address]

                                         

 

                          [facility]     [address] [id number]

[address] [place] [address], [place] [address]

                                         

 

                          [facility]     [address] [id number]

[address] [place] [address], [place] [address]

                          31 May, 2013               

 

                          [facility]     [address] [id number]

[address] [place] [address], [place] [address]

                          06 May, 2013               

 

                          [facility]     [address] [id number]

[address] [place] [address], [place] [address]

                                         

 

                          [facility]     [address] [id number]

[address] [place] [address], [place] [address]

                          28 Mar, 2013               

 

                          [facility]     [address] [id number]

[address] [place] [address], [place] [address]

                          18 Mar, 2013               

 

                          [facility]     [address] [id number]

[address] [place] [address], [place] [address]

                          14 Mar, 2013               

 

                          [facility]     [address] [id number]

[address] [place] [address], [place] [address]

                          13 Mar, 2013               

 

                          [facility]     [address] [id number]

[address] [place] [address], [place] [address]

                          12 Mar, 2013               

 

                          [facility]     [address] [id number]

[address] [place] [address], [place] [address]

                          11 Mar, 2013               

 

                          [facility]     [address] [id number]

[address] [place] [address], [place] [address]

                          06 Mar, 2013               

 

                          [facility]     [address] [id number]

[address] [place] [address], [place] [address]

                          06 Mar, 2013               

 

                          [facility]     [address] [id number]

[address] [place] [address], [place] [address]

                          06 Mar, 2013               

 

                          [facility]     [address] [id number]

[address] [place] [address], [place] [address]

                          06 Mar, 2013               

 

                          [facility]     [address] [id number]

[address] [place] [address], [place] [address]

                          05 Mar, 2013               

 

                          [facility]     [address] [id number]

[address] [place] [address], [place] [address]

                                         

 

                          [facility]     [address] [id number]

[address] [place] [address], [place] [address]

                                         

 

                          [facility]     [address] [id number]

[address] [place] [address], [place] [address]

                                         

 

                          [facility]     [address] [id number]

[address] [place] [address], [place] [address]

                                         

 

                          [facility]     [address] [id number]

[address] [place] [address], [place] [address]

                                         

 

                    [facility] [place] [address] [id number]

[place] [id number] 20 Aug, 2012

                                         

 

                          [facility]     [address] [id number]

[address] [place] [address], [place] [address]

                                         

 

                          [facility]     [address] [id number]

[address] [place] [address], [place] [address]

                                         

 

                          [facility]     [address] [id number]

[address] [place] [address], [place] [address]

                                         

 

                          [facility]     [address] [id number]

[address] [place] [address], [place] [address]

                                         

 

                          [facility]     [address] [id number]

[address] [place] [address], [place] [address]

                                         

 

                          [facility]     [address] [id number]

[address] [place] [address], [place] [address]

                                         

 

                          [facility]     [address] [id number]

[address] [place] [address], [place] [address]

                                         

 

                          [facility]     [address] [id number]

[address] [place] [address], [place] [address]

                                         

 

                          [facility]     [address] [id number]

[address] [place] [address], [place] [address]

                          26 Mar, 2012               

 

                          [facility]     [address] [id number]

[address] [place] [address], [place] [address]

                                         

 

                          [facility]     [address] [id number]

[address] [place] [address], [place] [address]

                                         

 

                          [facility]     [address] [id number]

[address] [place] [address], [place] [address]

                          12 Dec, 2011               

 

                          [facility]     [address] [id number]

[address] [place] [address], [place] [address]

                                         

 

                          [facility]     [address] [id number]

[address] [place] [address], [place] [address]

                                         

 

                          [facility]     [address] [id number]

[address] [place] [address], [place] [address]

                                         

 

                          [facility]     [address] [id number]

[address] [place] [address], [place] [address]

                                         

 

                          [facility]     [address] [id number]

[address] [place] [address], [place] [address]

                          31 Oct, 2011               

 

                          [facility]     [address] [id number]

[address] [place] [address], [place] [address]

                          17 Oct, 2011               

 

                          [facility]     [address] [id number]

[address] [place] [address], [place] [address]

                          17 Oct, 2011               

 

                          [facility]     [address] [id number]

[address] [place] [address], [place] [address]

                          10 Oct, 2011               

 

                          [facility]     [address] [id number]

[address] [place] [address], [place] [address]

                          22 Dec, 2010               

 

                          [facility]     [address] [id number]

[address] [place] [address], [place] [address]

                                         

 

                          [facility]     [address] [id number]

[address] [place] [address], [place] [address]

                                         

 

                          [facility]     [address] [id number]

[address] [place] [address], [place] [address]

                          19 May, 2010               

 

                          [facility]     [address] [id number]

[address] [place] [id number]

                          17 May, 2010               

 

                          [facility]     [address] [id number]

[address] [place] [phone]

                          13 May, 2010               

 

                          [facility]     [address] [id number]

[address] [place] [phone]

                          11 May, 2010               

 

                          [facility]     [address] [id number]

[address] [place] [id number]

                          10 May, 2010               

 

                          [facility]     [address] [id number]

[address] [place] [phone]

                          11 Dec, 2009               

 

                          [facility]     [address] [id number]

[address] [place] [phone]

                          11 Dec, 2009               

 

                          [facility]     [address] [facility] [id number]

[address] [place] [phone]

                                         

 

                          [facility]     [address] [facility] [id number]

[address] [place] [phone]

                                         







IMMUNIZATIONS

No Known Immunizations



SOCIAL HISTORY

Never Assessed



REASON FOR VISIT





PLAN OF CARE





VITAL SIGNS





                    Height              65.5 in             2013

 

                    Weight              271 lbs             2013

 

                    Temperature         96.6 degrees Fahrenheit 2013

 

                    Heart Rate          90 bpm              2013

 

                    Respiratory Rate    18 bpm              2013

 

                    Blood pressure systolic 110 mmHg            2013

 

                    Blood pressure diastolic 80 mmHg             2013







MEDICATIONS

Unknown Medications



RESULTS

No Results



PROCEDURES





                Procedure       Date Ordered    Result          Body Site

 

                TRICHOMONAS VAGIN, DIR PROBE 2013                   

 

                SCR PAP SMER;NEW PT OBTAIN PREP&CONVY-LAB 2013        

           

 

                CYTOPATH C/V AUTO FLUID REDO 2013                   

 

                CHYLMD TRACH, DNA, AMP PROBE 2013                   

 

                CULTURE, BACTERIA, OTHER 2013                   







INSTRUCTIONS





MEDICATIONS ADMINISTERED

No Known Medications



MEDICAL (GENERAL) HISTORY





                    Type                Description         Date

 

                    Medical History     asthma-dx at age 11-12  

 

                          Medical History           hypertension-hx of pre-eclam

psia with second pregnancy, was 

induced at 35 weeks                      

 

                    Medical History     cardiomyopathy-postpartum (non-ischemic)

 (Stephanie)  

 

                    Medical History     depression           

 

                    Medical History     CHF                  

 

                    Surgical History    tonsillectomy        

 

                    Surgical History    tubal               2014

 

                    Surgical History    tonsillectomy        

 

                          Surgical History          tubal [id number]-[id number]-lc dev

eloped respiratory issues and heart

failure following the surgery           2011

 

                    Surgical History    echo-2010, 8/19/10, , 11  

 

                          Hospitalization History   PPD #4 for non-ischemic card

iomyopathy, respiratory 

distress due to pulmonary edema, ARF    2010

 

                    Hospitalization History surgeries

## 2020-08-18 NOTE — XMS REPORT
Summarization of Episode Note

                             Created on: 2020



Josedemetra Love CHOWDHURY

External Reference #: [id number]

: 1985

Sex: Female



Demographics





                          Address                   [address]

[place]  [address]

 

                          Home Phone                [phone]

 

                          Preferred Language        Unknown

 

                          Marital Status            Unknown

 

                          Evangelical Affiliation     Unknown

 

                          Race                      White

 

                          Ethnic Group              Not  or 





Author





                          Author                    [facility]

 

                          Address                   Unknown

 

                          Phone                     Unavailable







Support





                Name            Relationship    Address         Phone

 

                    Miri Love   GUAR                [address] [place]  [address] [phone]

 

                Robert Chery/[place]  [address] [phone]







Care Team Providers





                    Care Team Member Name Role                Phone

 

                    JACKIE SHETH        Unavailable         [phone]







PROBLEMS





          Type      Condition ICD9-CM Code EYE48-DA Code Onset Dates Condition S

tatus SNOMED 

Code                                    Notes

 

        Problem Seasonal allergic rhinitis due to pollen         J30.1          

 Active  99132230  

 

                          Problem                   Heart failure, unspecified H

F chronicity, unspecified heart failure type

                      I50.9                 Active     35443677    

 

        Problem Asthma          J45.909         Active  [id number] ASTHMA UNSPECI

FIED







ALLERGIES





                    Allergen (clinical drug ingredient) Drug/Non Drug Allergy do

cumented on EMR 

Reaction            Allergy Type        Onset Date          Status

 

           paroxetine Paxil(NDC Code:[address]-65) rash       Drug Allergy     

       Active







ENCOUNTERS from 1983 to 2020





             Encounter    Location     Date         Provider     Diagnosis

 

                          [facility]     [address] [facility] [id number]

[address] [place] [address]

                    28 Mar, 2013        JACKIE SHETH           







IMMUNIZATIONS





                Vaccine         Route           Administration Date Status

 

                PRIVATE PPSV23 (PNEUMOVAX) Unknown         2013    Admin

istered

 

                influenza IIV3 (history) Unknown         2011    Pending

 

                DEPO MEDROL 40 MG/ML IM Intramuscular 2016    Administer

ed

 

                influenza IIV3 (history) Unknown         2013    Adminis

tered

 

                influenza IIV3 (history) Unknown         2013    Pending

 

                Influenza, seasonal, injectable (split), for 3 yrs and up Unknow

n         2010   

Administered







SOCIAL HISTORY

Tobacco Use:



                    Social History Observation Description         Date

 

                    Details (start date - stop date) Current some day smoker  



Sex Assigned At Birth:



                          Social History Observation Description

 

                          Sex Assigned At Birth     Unknown



Alcohol Screen (Audit-C)



                    Question            Answer              Notes

 

                    Did you have a drink containing alcohol in the past year? Ye

s                  

 

                    Points              1                    

 

                    Interpretation      Negative             

 

                          How often did you have 6 or more drinks on one occasio

n in the past year? Never 

(0 points)                               

 

                                        How many drinks did you have on a typica

l day when you were drinking in the past

year?                     1 or 2 (0 points)          

 

                          How often did you have a drink containing alcohol in t

he past year? Monthly or 

less (1 point)                           



Drug and Alcohol (Do not use)



                    Question            Answer              Notes

 

                    Total Score:        2                    

 

                    Interpretation:     Low level            



Tobacco Use/Smoking



                    Question            Answer              Notes

 

                    Are you a           current some day smoker  







REASON FOR REFERRAL

No Information



VITAL SIGNS

No information



MEDICATIONS





             Medication   SIG (Take, Route, Frequency, Duration) Start Date   En

d Date     Status

 

             Entresto 24-26 MG 1 tablet Orally Twice a day for 30 day(s) 10 Charles,

 2019              

Active

 

                    Metoprolol Succinate ER 25 MG 1 tablet Orally Once a day for

 30 day(s)                                                 Active

 

                          Amoxicillin-Pot Clavulanate 875-125 MG 1 tablet Orally

 every 12 hrs for 10 

day(s)              09 May, 2020                            Active







PROCEDURES

No Information



RESULTS

No Results



REASON FOR VISIT

No Information



MEDICAL (GENERAL) HISTORY





                    Type                Description         Date

 

                    Medical History     asthma-dx at age 11-12  

 

                          Medical History           hypertension-hx of pre-eclam

psia with second pregnancy, was 

induced at 35 weeks                      

 

                    Medical History     cardiomyopathy-postpartum (non-ischemic)

 (Stephanie)  

 

                    Medical History     depression           

 

                    Medical History     [facility]F                  

 

                    Surgical History    tonsillectomy        

 

                    Surgical History    tubal               2014

 

                    Surgical History    tonsillectomy        

 

                          Surgical History          tubal qcmipwba-Tmyyjf-oj dev

eloped respiratory issues and heart

failure following the surgery           2011

 

                    Surgical History    echo-2010, 8/19/10, , 11  

 

                          Hospitalization History   PPD #4 for non-ischemic card

iomyopathy, respiratory 

distress due to pulmonary edema, ARF    2010

 

                    Hospitalization History surgeries            







Goals Section

No Information



Health Concerns

No Information



MEDICAL EQUIPMENT

No Information



MENTAL STATUS

No Information



FUNCTIONAL STATUS

No Information



ASSESSMENTS

No Information



PLAN OF TREATMENT

Medication



                Medication Name Sig             Start Date      Stop Date

 

                          Amoxicillin-Pot Clavulanate 875-125 MG 1 tablet Orally

 every 12 hrs for 10 

day(s)                    09 May, 2020

## 2020-08-18 NOTE — XMS REPORT
[facility]

                             Created on: 2020



Love Chery

External Reference #: [id number]

: 1985

Sex: Female



Demographics





                          Address                   [address] [place]  [address]

 

                          Preferred Language        Unknown

 

                          Marital Status            Unknown

 

                          Restoration Affiliation     Unknown

 

                          Race                      Unknown

 

                          Ethnic Group              Unknown





Author





                          Author                    Love GONZALEZ

 

                          Organization              [facility]

 

                          Address                   [address] [place]  [address]



 

                          Phone                     [phone]







Care Team Providers





                    Care Team Member Name Role                Phone

 

                    YEYO GONZALEZ      Unavailable         [phone]







PROBLEMS





          Type      Condition ICD9-CM Code ZNT76-AG Code Onset Dates Condition S

tatus SNOMED 

Code

 

          Problem   Seasonal allergic rhinitis due to pollen           J30.1    

           Active    10348732

 

                          Problem                   Heart failure, unspecified H

F chronicity, unspecified heart failure type

                          I50.9                     Active       63719911

 

          Problem   Asthma              J45.909             Active    884092041







ALLERGIES

No Information



ENCOUNTERS





                Encounter       Location        Date            Diagnosis

 

                          [facility] WALK IN [facility]  [address] [facility] [id number]

[address] [place] 

[address]                05 Mar, 2020              Sore throat J02.9

 

                    [facility] [address] [address] 09 

Sep, 2019                                

 

                    [facility] [address] [address]                                 

 

                    [facility] [address] [address] 17 

May, 2019                                

 

                    [facility] [address] [address] 14 

May, 2019                                

 

                    [facility] [address] [address] 28 

Mar, 2019                                

 

                    [facility] [address] [address] 06 

Mar, 2019                               Morbid obesity E66.01 and Localized gilmar

a R60.0

 

                    [facility] [address] [address]                                 

 

                    [facility] [address] [address] 10 

Charles, 2019                                

 

                    [place] [address] [address] 10 

Charles, 2019                               Heart failure, unspecified HF chronicity

, unspecified heart failure 

type I50.9

 

                          [facility] IN [facility]  [address] 

[address]                              Seasonal allergic rhinitis d

ue to pollen J30.1 and 

Asthma J45.909

 

                          [facility] WALK IN [place] [address] 

[address]                              Seasonal allergic rhinitis d

ue to pollen J30.1

 

                          [facility] WALK IN [address] 

[address]                19 Oct, 2016              Acute upper respiratory infe

ction, unspecified J06.9

 

                    [place] [address] [address] 05 

Oct, 2016                                

 

                          [facility] WALK IN [place] [address] 

[address]                13 May, 2016              Environmental allergies Z91.

09

 

                    [place] [address] [address] 24 

Aug, 2015                               Abscess 682.9

 

                    [place] [address] [address] 17 

Aug, 2015                               Mood disorder 296.90

 

                    [place] [address] [address]                                Screen for STD (sexually transmitted dis

ease) V74.5

 

                    [place] [address] [address] 15 

Jul, 2015                                

 

                    [place] [address] [address]                                Depression 311

 

                    [place] [address] [address]                                Major depressive disorder, recurrent epi

sode, severe 296.33 and No 

condition on Axis II V71.09

 

                    [place] [address] [address] 10 

2015                                

 

                    [facility] [address] [facility] [id number] [facility]

 [place] [address] 14 

2015                                

 

                    [facility] [address] [facility] [id number] [place] [address] 13 

2015                                

 

                    [facility] [address] [facility] [place] [address] 16 

Sep, 2014                                

 

                    [facility] [address] [facility] [id number] [place] [address] 16 

Sep, 2014                                

 

                    [facility] [address] [facility] [id number] [place] [address] 15 

Sep, 2014                                

 

                    [facility] [address] [facility] [place] [address] 15 

Sep, 2014                                

 

                    [facility] [address] [facility] [place] [address] 12 

Sep, 2014                                

 

                    [facility] [address] [facility] [place] [address] 12 

Sep, 2014                                

 

                    [facility] [address] [facility] [place] [address] 06 

Aug, 2014                                

 

                    [facility] [address] [facility] [place] [address] 06 

Aug, 2014                                

 

                    [facility] [address] [facility] [place] [address]                                 

 

                    [facility] [address] [facility] [place] [address]                                 

 

                    [facility] [address] [facility] [place] [address] 10 

Adam, 2014                                

 

                    [facility] [address] [facility] [place] [address] 10 

Adam, 2014                                

 

                    [facility] [address] [facility] [place] [address] 07 

May, 2014                                

 

                    [facility] [address] [facility] [place] [address] 07 

May, 2014                                

 

                    [facility] [address] [facility] [place] [address] 05 

May, 2014                                

 

                    [facility] [address] [facility] [place] [address] 05 

May, 2014                                

 

                    [facility] [address] [facility] [place] [address]                                 

 

                    [facility] [address] [id number] [place] [address]                                 

 

                    [facility] [address] [facility] [place] [address] 25 

Mar, 2014                                

 

                    [facility] [address] [facility] [place] [address] 24 

Mar, 2014                                

 

                    [facility] [address] [facility] [place] [address] 24 

Mar, 2014                                

 

                    [facility] [address] [facility] [place] [address] 13 

Mar, 2014                                

 

                    [facility] [address] [facility] [place] [address] 13 

Mar, 2014                                

 

                    [facility] [address] [facility] [place] [address]                                 

 

                    [facility] [address] [facility] [place] [address]                                 

 

                    [facility] [address] [facility] [place] [address]                                 

 

                    [facility] [address] [facility] [place] [address]                                 

 

                    [facility] [address] [facility] [place] [address]                                 

 

                    [facility] [address] [facility] [place] [address]                                 

 

                    [facility] [address] [facility] [place] [address]                                 

 

                    [facility] [address] [facility] [place] [address] 19 

Dec, 2013                                

 

                    [facility] [address] [facility] [place] [address] 19 

Dec, 2013                                

 

                    [facility] [address] [id number] [place] [address]                                 

 

                    [facility] [address] [id number] [place] [address]                                 

 

                    [facility] [address] [facility] [place] [address]                                 

 

                    [facility] [address] [facility] [place] [address]                                 

 

                    [facility] [address] [facility] [place] [address] 27 

Sep, 2013                                

 

                    [facility] [address] [facility] [place] [address] 05 

Sep, 2013                                

 

                    [facility] [address] [place] [id number] [place] [address]                                 

 

                    [facility] [address] [facility] [place] [address]                                 

 

                    [facility] [address] [place] [id number] [place] [address]                                 

 

                    [facility] [address] [facility] [place] [address]                                 

 

                    [facility] [address] [place] [id number] [place] [address] 31 

May, 2013                                

 

                    [facility] [address] [facility] [place] [address] 06 

May, 2013                                

 

                    [facility] [address] [facility] [place] [address]                                 

 

                    [facility] [address] [facility] [place] [address] 28 

Mar, 2013                                

 

                    [facility] [address] [place] [id number] [place] [address] 18 

Mar, 2013                                

 

                    [facility] [address] [facility] [place] [address] 14 

Mar, 2013                                

 

                    [facility] [address] [facility] [place] [address] 13 

Mar, 2013                                

 

                    [facility] [address] [facility] [place] [address] 12 

Mar, 2013                                

 

                    [facility] [address] [facility] [place] [address] 11 

Mar, 2013                                

 

                    [facility] [address] [facility] [place] [address] 06 

Mar, 2013                                

 

                    [facility] [address] [facility] [place] [address] 06 

Mar, 2013                                

 

                    [facility] [address] [facility] [place] [address] 06 

Mar, 2013                                

 

                    [facility] [address] [facility] [place] [address] 06 

Mar, 2013                                

 

                    [facility] [address] [facility] [place] [address] 05 

Mar, 2013                                

 

                    [facility] [address] [facility] [place] [address]                                 

 

                    [facility] [address] [facility] [place] [address]                                 

 

                    [facility] [address] [facility] [place] [address]                                 

 

                    [facility] [address] [facility] [place] [address]                                 

 

                    [facility] [address] [facility] [place] [address]                                 

 

                [facility] [address] [place] [address] 20

 Aug, 2012     

 

                    [facility] [address] [id number] [place] [address]                                 

 

                    [facility] [address] [facility] [place] [address]                                 

 

                    [facility] [address] [facility] [place] [address] 21 

2012                                

 

                    [facility] [address] [facility] [place] [address]                                 

 

                    [facility] [address] [facility] [place] [address]                                 

 

                    [facility] [address] [facility] [place] [address]                                 

 

                    [facility] [address] [facility] [place] [address] 18 

2012                                

 

                    [facility] [address] [facility] [place] [address]                                 

 

                    [facility] [address] [facility] [place] [address] 26 

Mar, 2012                                

 

                    [facility] [address] [facility] [place] [address]                                 

 

                    [facility] [address] [facility] [place] [address]                                 

 

                    [facility] [address] [facility] [place] [address] 12 

Dec, 2011                                

 

                    [facility] [address] [facility] [place] [address]                                 

 

                    [facility] [address] [facility] [place] [address]                                 

 

                    [facility] [address] [facility] [place] [address]                                 

 

                    [facility] [address] [facility] [place] [address]                                 

 

                    [facility] [address] [facility] [place] [address] 31 

Oct, 2011                                

 

                    [facility] [address] [facility] [place] [address] 17 

Oct, 2011                                

 

                    [facility] [address] [facility] [place] [address] 17 

Oct, 2011                                

 

                    [facility] [address] [place] [id number] [place] [address] 10 

Oct, 2011                                

 

                    [facility] [address] [place] [id number] [place] [address] 22 

Dec, 2010                                

 

                    [facility] [address] [place] [id number] [place] [address]                                 

 

                    [facility] [address] [place] [id number] [place] [address]                                 

 

                    [facility] [address] [place] [id number] [place] [address] 19 

May, 2010                                

 

                    [facility] [address] [address] 17 

May, 2010                                

 

                    [facility] [address] [address] 13 

May, 2010                                

 

                    [facility] [address] [address] 11 

May, 2010                                

 

                    [facility] [address] [place] [id number] [place] [address] 10 

May, 2010                                

 

                    [facility] [address] [place] [id number] [place] [address] 11 

Dec, 2009                                

 

                    [facility] [address] [place] [id number] [place] [address] 11 

Dec, 2009                                

 

                    [facility] [address] [place] [id number] [place] [address]                                 

 

                    [facility] [address] [place] [id number] [place] [address]                                 







IMMUNIZATIONS

No Known Immunizations



SOCIAL HISTORY

Never Assessed



REASON FOR VISIT

Medication Refill



PLAN OF CARE





VITAL SIGNS





MEDICATIONS





        Medication Instructions Dosage  Frequency Start Date End Date Duration S

tatus

 

          Metoprolol Succinate ER 25 MG Orally Once a day 1 tablet  24h       14

 2019           30 

day(s)                                  Active







RESULTS

No Results



PROCEDURES

No Known procedures



INSTRUCTIONS





MEDICATIONS ADMINISTERED

No Known Medications



MEDICAL (GENERAL) HISTORY





                    Type                Description         Date

 

                    Medical History     asthma-dx at age 11-12  

 

                          Medical History           hypertension-hx of pre-eclam

psia with second pregnancy, was 

induced at 35 weeks                      

 

                    Medical History     cardiomyopathy-postpartum (non-ischemic)

 (Stephanie)  

 

                    Medical History     depression           

 

                    Medical History     CHF                  

 

                    Surgical History    tonsillectomy        

 

                    Surgical History    tubal               

 

                    Surgical History    tonsillectomy        

 

                          Surgical History          tubal xqklsjii-[id number] dev

eloped respiratory issues and heart

failure following the surgery           2011

 

                    Surgical History    echo-2010, 8/19/10, , 11  

 

                          Hospitalization History   PPD #4 for non-ischemic card

iomyopathy, respiratory 

distress due to pulmonary edema, ARF    2010

 

                    Hospitalization History surgeries

## 2020-08-18 NOTE — XMS REPORT
[facility]

                             Created on: 2020



Love Chery

External Reference #: [id number]

: 1985

Sex: Female



Demographics





                          Address                   [address] [place]  [address]

 

                          Preferred Language        Unknown

 

                          Marital Status            Unknown

 

                          Jainism Affiliation     Unknown

 

                          Race                      Unknown

 

                          Ethnic Group              Unknown





Author





                          Author                    Love SHETH

 

                          [facility]

 

                          Address                   [address] [place]  [address]



 

                          Phone                     [phone]







Care Team Providers





                    Care Team Member Name Role                Phone

 

                    MERYL  JACKIE        Unavailable         [phone]







PROBLEMS





          Type      Condition ICD9-CM Code PLX25-UU Code Onset Dates Condition S

tatus SNOMED 

Code

 

          Problem   Seasonal allergic rhinitis due to pollen           J30.1    

           Active    [id number]

 

                          Problem                   Heart failure, unspecified H

F chronicity, unspecified heart failure type

                          I50.9                     Active       63385156

 

          Problem   Asthma              J45.909             Active    [id number]







ALLERGIES

No Information



ENCOUNTERS





                Encounter       Location        Date            Diagnosis

 

                          [facility] [place]     [address] [id number]

[address] [place] [address]

                          10 Jul, 2020               

 

                          [facility]  [address] [id number]

[address] [place] [address] 

[address]                09 May, 2020              Dental abscess K04.7

 

                          [facility]  [address] [id number]

[address] [place] [address] 

[address]                              Acute left-sided low back pa

in without sciatica M54.5

 

                          [facility]  [address] [id number]

[address] [place] [address] 

[address]                05 Mar, 2020              Sore throat J02.9

 

                          [facility] [place]     [address] [id number]

[address] [place] [address]

                          09 Sep, 2019               

 

                          [facility] [place]     [address] [id number]

[address] [place] [address]

                                         

 

                          [facility] [place]     [address] [id number]

[address] [place] [address]

                          17 May, 2019               

 

                          [facility] [place]     [address] [id number]

[address] [place] [address]

                          14 May, 2019               

 

                          [facility] [place]     [address] [address]

                          28 Mar, 2019               

 

                          [facility] [place]     [address] [address]

                          06 Mar, 2019              Morbid obesity E66.01 and Lo

calized edema R60.0

 

                          [facility] [place]     [address] [address]

                                         

 

                          [facility] [place]     [address] [address]

                          10 Charles, 2019               

 

                          [facility] [place]     [address] [address]

                          10 Charles, 2019              Heart failure, unspecified H

F chronicity, unspecified heart failure

type I50.9

 

                          [facility]  [address] 

[address]                              Seasonal allergic rhinitis d

ue to pollen J30.1 and 

Asthma J45.909

 

                          [facility]  [address] 

[address]                              Seasonal allergic rhinitis d

ue to pollen J30.1

 

                          [facility]  [address] 

[address]                19 Oct, 2016              Acute upper respiratory infe

ction, unspecified J06.9

 

                          [facility] [place]     [address] [address]

                          05 Oct, 2016               

 

                          [facility]  [address] 

[address]                13 May, 2016              Environmental allergies Z91.

09

 

                          [place] [address] [address]

                          24 Aug, 2015              Abscess 682.9

 

                          [facility] [place]     [address] [address]

                          17 Aug, 2015              Mood disorder 296.90

 

                          [facility] [place]     [address] [address]

                                        Screen for STD (sexually tra

nsmitted disease) V74.5

 

                          [facility] [place]     [address] [id number]

[address] [place] [address]

                          15 2015               

 

                          [facility]     [address] [id number]

[address] [place] [address]

                          13 2015              Depression 311

 

                          [facility] [place]     [address] [id number]

[address] [place] [address]

                                        Major depressive disorder, r

ecurrent episode, severe 296.33 and No 

condition on Axis II V71.09

 

                          [facility] [place]     [address] [id number]

[address] [place] [address]

                          10 2015               

 

                          [facility] [place]     [address] [id number]

[address] [place] [address]

                          14 2015               

 

                          [facility] [place]     [address] [id number]

[address] [place] [address]

                                         

 

                          [facility] [place]     [address] [id number]

[address] [place] [address]

                          16 Sep, 2014               

 

                          [facility] [place]     [address] [id number]

[address] [place] [address]

                          16 Sep, 2014               

 

                          [facility] [place]     [address] [id number]

[address] [place] [address]

                          15 Sep, 2014               

 

                          [facility] [place]     [address] [id number]

[address] [place] [address]

                          15 Sep, 2014               

 

                          [facility]     [address] [id number]

[address] [place] [address]

                          12 Sep, 2014               

 

                          [facility]     [address] [id number]

[address] [place] [address]

                          12 Sep, 2014               

 

                          [facility]     [address] [id number]

[address] [place] [address]

                          06 Aug, 2014               

 

                          [facility] [place]     [address] [id number]

[address] [place] [address]

                          06 Aug, 2014               

 

                          [facility]     [address] [id number]

[facility] [place] [address]

                                         

 

                          [facility]     [address] [id number]

[facility] [place] [address]

                                         

 

                          [facility]     [address] [id number]

[facility] [place] [address]

                          10 Adam, 2014               

 

                          [facility]     [address] [id number]

[address] [place] [address], [place] [address]

                          10 Adam, 2014               

 

                          [facility]     [address] [id number]

[facility] [place] [address]

                          07 May, 2014               

 

                          [facility]     [address] [id number]

[address] [place] [address], [place] [address]

                          07 May, 2014               

 

                          [facility]     [address] [id number]

[address] [place] [address], [place] [address]

                          05 May, 2014               

 

                          [facility]     [address] [id number]

[address] [place] [address], [place] [address]

                          05 May, 2014               

 

                          [facility]     [address] [id number]

[address] [place] [address], [place] [address]

                                         

 

                          [facility]     [address] [id number]

[address] [place] [address], [place] [address]

                                         

 

                          [facility]     [address] [id number]

[address] [place] [address], [place] [address]

                          25 Mar, 2014               

 

                          [facility]     [address] [id number]

[address] [place] [address], [place] [address]

                          24 Mar, 2014               

 

                          [facility]     [address] [id number]

[address] [place] [address], [place] [address]

                          24 Mar, 2014               

 

                          [facility]     [address] [id number]

[address] [place] [address], [place] [address]

                          13 Mar, 2014               

 

                          [facility]     [address] [id number]

[address] [place] [address], [place] [address]

                          13 Mar, 2014               

 

                          [facility]     [address] [id number]

[address] [place] [address], [place] [address]

                                         

 

                          [facility]     [address] [id number]

[address] [place] [address], [place] [address]

                                         

 

                          [facility]     [address] [id number]

[address] [place] [address], [place] [address]

                                         

 

                          [facility]     [address] [id number]

[address] [place] [address], [place] [address]

                                         

 

                          [facility]     [address] [id number]

[address] [place] [address], [place] [address]

                                         

 

                          [facility]     [address] [id number]

[address] [place] [address], [place] [address]

                                         

 

                          [facility]     [address] [id number]

[address] [place] [address], [place] [address]

                                         

 

                          [facility]     [address] [id number]

[address] [place] [address], [place] [address]

                          19 Dec, 2013               

 

                          [facility]     [address] [id number]

[address] [place] [address], [place] [address]

                          19 Dec, 2013               

 

                          [facility]     [address] [id number]

[address] [place] [address], [place] [address]

                                         

 

                          [facility]     [address] [id number]

[address] [place] [address], [place] [address]

                                         

 

                          [facility]     [address] [id number]

[address] [place] [address], [place] [address]

                                         

 

                          [facility]     [address] [id number]

[address] [place] [address]

                                         

 

                          [facility]     [address] [id number]

[address] [place] [address], [place] [address]

                          27 Sep, 2013               

 

                          [facility]     [address] [id number]

[address] [place] [address], [place] [address]

                          05 Sep, 2013               

 

                          [facility]     [address] [id number]

[address] [place] [address], [place] [address]

                                         

 

                          [facility]     [address] [id number]

[address] [place] [address], [place] [address]

                          12 2013               

 

                          [facility]     [address] [id number]

[address] [place] [address], [place] [address]

                          05 2013               

 

                          [facility]     [address] [id number]

[address] [place] [address], [place] [address]

                                         

 

                          [facility]     [address] [id number]

[address] [place] [address], [place] [address]

                          31 May, 2013               

 

                          [facility]     [address] [id number]

[address] [place] [address], [place] [address]

                          06 May, 2013               

 

                          [facility]     [address] [id number]

[address] [place] [address], [place] [address]

                                         

 

                          [facility]     [address] [id number]

[address] [place] [address], [place] [address]

                          28 Mar, 2013               

 

                          [facility]     [address] [id number]

[address] [place] [address], [place] [address]

                          18 Mar, 2013               

 

                          [facility]     [address] [id number]

[address] [place] [address], [place] [address]

                          14 Mar, 2013               

 

                          [facility]     [address] [id number]

[address] [place] [address], [place] [address]

                          13 Mar, 2013               

 

                          [facility]     [address] [id number]

[address] [place] [address], [place] [address]

                          12 Mar, 2013               

 

                          [facility]     [address] [id number]

[address] [place] [address], [place] [address]

                          11 Mar, 2013               

 

                          [facility]     [address] [id number]

[address] [place] [address], [place] [address]

                          06 Mar, 2013               

 

                          [facility]     [address] [id number]

[address] [place] [address], [place] [address]

                          06 Mar, 2013               

 

                          [facility]     [address] [id number]

[address] [place] [address], [place] [address]

                          06 Mar, 2013               

 

                          [facility]     [address] [id number]

[address] [place] [address], [place] [address]

                          06 Mar, 2013               

 

                          [facility]     [address] [id number]

[address] [place] [address], [place] [address]

                          05 Mar, 2013               

 

                          [facility]     [address] [id number]

[address] [place] [address], [place] [address]

                                         

 

                          [facility]     [address] [id number]

[address] [place] [address], [place] [address]

                                         

 

                          [facility]     [address] [id number]

[address] [place] [address], [place] [address]

                                         

 

                          [facility]     [address] [id number]

[address] [place] [address], [place] [address]

                                         

 

                          [facility]     [address] [id number]

[address] [place] [address], [place] [address]

                                         

 

                    [facility] [address] [id number] [place] 

[place] [id number] 20 Aug, 2012

                                         

 

                          [facility]     [address] [id number]

[address] [place] [address], [place] [address]

                                         

 

                          [facility]     [address] [id number]

[address] [place] [address], [place] [address]

                                         

 

                          [facility]     [address] [id number]

[address] [place] [address], [place] [address]

                                         

 

                          [facility]     [address] [id number]

[address] [place] [address], [place] [address]

                                         

 

                          [facility]     [address] [id number]

[address] [place] [address], [place] [address]

                                         

 

                          [facility]     [address] [id number]

[address] [place] [address], [place] [address]

                                         

 

                          [facility]     [address] [id number]

[address] [place] [address], [place] [address]

                                         

 

                          [facility]     [address] [id number]

[address] [place] [address], [place] [address]

                                         

 

                          [facility]     [address] [id number]

[address] [place] [address], [place] [address]

                          26 Mar, 2012               

 

                          [facility]     [address] [id number]

[address] [place] [address], [place] [address]

                                         

 

                          [facility]     [address] [id number]

[address] [place] [address], [place] [address]

                                         

 

                          [facility]     [address] [id number]

[address] [place] [address], [place] [address]

                          12 Dec, 2011               

 

                          [facility]     [address] [id number]

[address] [place] [address], [place] [address]

                                         

 

                          [facility]     [address] [id number]

[address] [place] [address], [place] [address]

                                         

 

                          [facility]     [address] [id number]

[address] [place] [address], [place] [address]

                                         

 

                          [facility]     [address] [id number]

[address] [place] [address], [place] [address]

                                         

 

                          [facility]     [address] [id number]

[address] [place] [address], [place] [address]

                          31 Oct, 2011               

 

                          [facility]     [address] [id number]

[address] [place] [address], [place] [address]

                          17 Oct, 2011               

 

                          [facility]     [address] [id number]

[address] [place] [address], [place] [address]

                          17 Oct, 2011               

 

                          [facility]     [address] [id number]

[address] [place] [address], [place] [address]

                          10 Oct, 2011               

 

                          [facility]     [address] [id number]

[address] [place] [address]

                          22 Dec, 2010               

 

                          [facility]     [address] [id number]

[address] [place] [address], [place] [address]

                                         

 

                          [facility]     [address] [id number]

[address] [place] [address], [place] [address]

                                         

 

                          [facility]     [address] [id number]

[address] [place] [address], [place] [address]

                          19 May, 2010               

 

                          [facility]     [address] [id number]

[address] [place] [id number]

                          17 May, 2010               

 

                          [facility], [place], [facility]     [address] [id number]

[address] [place] [id number]

                          13 May, 2010               

 

                          [facility], [place], [facility]     [address] [id number]

[address] [place] [id number]

                          11 May, 2010               

 

                          [facility] [place]     [address] [facility] [id number]

[address] [place] [id number]

                          10 May, 2010               

 

                          [facility] [place]     [address] [facility] [id number]

[address] [place] [id number]

                          11 Dec, 2009               

 

                          [facility] [place]     [address] [facility] [id number]

[address] [place] [id number]

                          11 Dec, 2009               

 

                          [facility] [place]     [address] [facility] [id number]

[address] [place] [id number]

                                         

 

                          [facility] [place]     [address] [facility] [id number]

[address] [place] [phone]

                                         







IMMUNIZATIONS

No Known Immunizations



SOCIAL HISTORY

Never Assessed



REASON FOR VISIT





PLAN OF CARE





VITAL SIGNS





MEDICATIONS

Unknown Medications



RESULTS

No Results



PROCEDURES

No Known procedures



INSTRUCTIONS





MEDICATIONS ADMINISTERED

No Known Medications



MEDICAL (GENERAL) HISTORY





                    Type                Description         Date

 

                    Medical History     asthma-dx at age 11-12  

 

                          Medical History           hypertension-hx of pre-eclam

psia with second pregnancy, was 

induced at 35 weeks                      

 

                    Medical History     cardiomyopathy-postpartum (non-ischemic)

 (Stephanie)  

 

                    Medical History     depression           

 

                    Medical History     [facility]                  

 

                    Surgical History    tonsillectomy        

 

                    Surgical History    tubal               2014

 

                    Surgical History    tonsillectomy        

 

                          Surgical History          tubal [id number] dev

eloped respiratory issues and heart

failure following the surgery           2011

 

                    Surgical History    echo-2010, 8/19/10, , 11  

 

                          Hospitalization History   PPD #4 for non-ischemic card

iomyopathy, respiratory 

distress due to pulmonary edema, ARF    2010

 

                    Hospitalization History surgeries

## 2020-08-18 NOTE — XMS REPORT
[facility]

                             Created on: 2020



Love Chery

External Reference #: [id number]

: 1985

Sex: Female



Demographics





                          Address                   [address]

[place]  [address]

 

                          Preferred Language        Unknown

 

                          Marital Status            Unknown

 

                          Gnosticism Affiliation     Unknown

 

                          Race                      Unknown

 

                          Ethnic Group              Unknown





Author





                          Author                    Love SHETH

 

                          [facility]

 

                          Address                   [address] [place]  [address]



 

                          Phone                     [phone]







Care Team Providers





                    Care Team Member Name Role                Phone

 

                    JACKIE SHETH        Unavailable         [phone]







PROBLEMS





          Type      Condition ICD9-CM Code DLK16-PX Code Onset Dates Condition S

tatus SNOMED 

Code

 

          Problem   Seasonal allergic rhinitis due to pollen           J30.1    

           Active    [id number]

 

                          Problem                   Heart failure, unspecified H

F chronicity, unspecified heart failure type

                          I50.9                     Active       [id number]

 

          Problem   Asthma              J45.909             Active    [id number]







ALLERGIES

No Information



ENCOUNTERS





                Encounter       Location        Date            Diagnosis

 

                          [facility]  [address] [facility] [id number]

[address] [place] 

[address]                05 Mar, 2020              Sore throat J02.9

 

                    [facility] [address] [address] 09 

Sep, 2019                                

 

                    [facility] [address] [address]                                 

 

                    [facility] [address] [address] 17 

May, 2019                                

 

                    [facility] [address] [address] 14 

May, 2019                                

 

                    [facility] [address] [address] 28 

Mar, 2019                                

 

                    [facility] [address] [address] 06 

Mar, 2019                               Morbid obesity E66.01 and Localized gilmar

a R60.0

 

                    [facility] [address] [address]                                 

 

                    [facility] [address] [address] 10 

Charles, 2019                                

 

                    [place] [address] [address] 10 

Charles, 2019                               Heart failure, unspecified HF chronicity

, unspecified heart failure 

type I50.9

 

                          [facility]  [address] [place] [id number]

[address] [place] [address] 

[address]                              Seasonal allergic rhinitis d

ue to pollen J30.1 and 

Asthma J45.909

 

                          [facility]  [address] 

[address]                              Seasonal allergic rhinitis d

ue to pollen J30.1

 

                          [facility] IN [address] 

[address]                19 Oct, 2016              Acute upper respiratory infe

ction, unspecified J06.9

 

                    [place] [address] [address] 05 

Oct, 2016                                

 

                          [facility] IN [address] 

[address]                13 May, 2016              Environmental allergies Z91.

09

 

                    [place] [address] [address] 24 

Aug, 2015                               Abscess 682.9

 

                    [place] [address] [address] 17 

Aug, 2015                               Mood disorder 296.90

 

                    [place] [address] [address]                                Screen for STD (sexually transmitted dis

ease) V74.5

 

                    [place] [address] [address] 15 

Jul, 2015                                

 

                    [place] [address] [address]                                Depression 311

 

                    [place] [address] [address]                                Major depressive disorder, recurrent epi

sode, severe 296.33 and No 

condition on Axis II V71.09

 

                    [place] [address] [place] [id number] [place] [address] 10 

Jul,                                 

 

                    [facility] [address] [facility] [id number] [place] [address] 14 

2015                                

 

                    [facility] [address] [id number] [place] [address] 13 

2015                                

 

                    [facility] [address] [id number] [place] [address] 16 

Sep, 2014                                

 

                    [facility] [address] [id number] [place] [address] 16 

Sep, 2014                                

 

                    [facility] [address] [id number] [place] [address] 15 

Sep, 2014                                

 

                    [facility] [address] [id number] [place] [address] 15 

Sep, 2014                                

 

                    [facility] [address] [id number] [place] [address] 12 

Sep, 2014                                

 

                    [facility] [address] [id number] [place] [address] 12 

Sep, 2014                                

 

                    [facility] [address] [id number] [place] [address] 06 

Aug, 2014                                

 

                    [facility] [address] [id number] [place] [address] 06 

Aug, 2014                                

 

                    [facility] [address] [id number] [place] [address]                                 

 

                    [facility] [address] [id number] [place] [address]                                 

 

                    [facility] [address] [id number] [place] [address] 10 

Adam, 2014                                

 

                    [facility] [address] [id number] [place] [address] 10 

Adam, 2014                                

 

                    [facility] [address] [id number] [place] [address] 07 

May, 2014                                

 

                    [facility] [address] [id number] [place] [address] 07 

May, 2014                                

 

                    [facility] [address] [id number] [place] [address] 05 

May, 2014                                

 

                    [facility] [address] [id number] [place] [address] 05 

May, 2014                                

 

                    [facility] [address] [id number] [place] [address]                                 

 

                    [facility] [address] [id number] [place] [address]                                 

 

                    [facility] [address] [id number] [place] [address] 25 

Mar, 2014                                

 

                    [facility] [address] [id number] [place] [address] 24 

Mar, 2014                                

 

                    [facility] [address] [id number] [place] [address] 24 

Mar, 2014                                

 

                    [facility] [address] [id number] [place] [address] 13 

Mar, 2014                                

 

                    [facility] [address] [id number] [place] [address] 13 

Mar, 2014                                

 

                    [facility] [address] [id number] [place] [address]                                 

 

                    [facility] [address] [id number] [place] [address]                                 

 

                    [facility] [address] [id number] [place] [address]                                 

 

                    [facility] [address] [id number] [place] [address]                                 

 

                    [facility] [address] [id number] [place] [address]                                 

 

                    [facility] [address] [id number] [place] [address]                                 

 

                    [facility] [address] [id number] [place] [address]                                 

 

                    [facility] [address] [id number] [place] [address] 19 

Dec, 2013                                

 

                    [facility] [address] [id number] [place] [address] 19 

Dec, 2013                                

 

                    [facility] [address] [id number] [place] [address]                                 

 

                    [facility] [address] [id number] [place] [address]                                 

 

                    [facility] [address] [id number] [place] [address]                                 

 

                    [facility] [address] [id number] [place] [address]                                 

 

                    [facility] [address] [id number] [place] [address] 27 

Sep, 2013                                

 

                    [facility] [address] [id number] [place] [address] 05 

Sep, 2013                                

 

                    [facility] [address] [id number] [place] [address]                                 

 

                    [facility] [address] [id number] [place] [address]                                 

 

                    [facility] [address] [id number] [place] [address]                                 

 

                    [facility] [address] [id number] [place] [address]                                 

 

                    [facility] [address] [id number] [place] [address] 31 

May, 2013                                

 

                    [facility] [address] [id number] [place] [address] 06 

May, 2013                                

 

                    [facility] [address] [id number] [place] [address]                                 

 

                    [facility] [address] [id number] [place] [address] 28 

Mar, 2013                                

 

                    [facility] [address] [id number] [place] [address] 18 

Mar, 2013                                

 

                    [facility] [address] [id number] [place] [address] 14 

Mar, 2013                                

 

                    [facility] [address] [id number] [place] [address] 13 

Mar, 2013                                

 

                    [facility] [address] [id number] [place] [address] 12 

Mar, 2013                                

 

                    [facility] [address] [id number] [place] [address] 11 

Mar, 2013                                

 

                    [facility] [address] [id number] [place] [address] 06 

Mar, 2013                                

 

                    [facility] [address] [id number] [place] [address] 06 

Mar, 2013                                

 

                    [facility] [address] [id number] [place] [address] 06 

Mar, 2013                                

 

                    [facility] [address] [id number] [place] [address] 06 

Mar, 2013                                

 

                    [facility] [address] [id number] [place] [address] 05 

Mar, 2013                                

 

                    [facility] [address] [id number] [place] [address]                                 

 

                    [facility] [address] [id number] [place] [address]                                 

 

                    [facility] [address] [id number] [place] [address]                                 

 

                    [facility] [address] [id number] [place] [address]                                 

 

                    [facility] [address] [id number] [place] [address]                                 

 

                [facility] [id number] [place] [address] 20

 Aug, 2012     

 

                    [facility] [address] [id number] [place] [address]                                 

 

                    [facility] [address] [id number] [place] [address]                                 

 

                    [facility] [address] [id number] [place] [address]                                 

 

                    [facility] [address] [id number] [place] [address]                                 

 

                    [facility] [address] [id number] [place] [address]                                 

 

                    [facility] [address] [id number] [place] [address]                                 

 

                    [facility] [address] [id number] [place] [address]                                 

 

                    [facility] [address] [id number] [place] [address]                                 

 

                    [facility] [address] [id number] [place] [address] 26 

Mar, 2012                                

 

                    [facility] [id number] [place] [address]                                 

 

                    [facility] [address] [place] [id number] [place] [address]                                 

 

                    [facility] [address] [id number] [place] [address] 12 

Dec, 2011                                

 

                    [facility] [address] [id number] [place] [address]                                 

 

                    [facility] [id number] [place] [address]                                 

 

                    [facility] [address] [id number] [place] [address] 16 

2011                                

 

                    [facility] [address] [id number] [place] [address]                                 

 

                    [facility] [address] [id number] [place] [address] 31 

Oct, 2011                                

 

                    [facility] [address] [id number] [place] [address] 17 

Oct, 2011                                

 

                    [facility] [address] [facility] [place] [address] 17 

Oct, 2011                                

 

                    [facility] [address] [facility] [place] [address] 10 

Oct, 2011                                

 

                    [facility] [id number] [place] [address] 22 

Dec, 2010                                

 

                    [facility] [address] [facility] [place] [address]                                 

 

                    [facility] [id number] [place] [address]                                 

 

                    [facility] [id number] [place] [address] 19 

May, 2010                                

 

                    [facility] [id number] [place] [address] 17 

May, 2010                                

 

                    [facility] [id number] [place] [address] 13 

May, 2010                                

 

                    [facility] [id number] [place] [address] 11 

May, 2010                                

 

                    [facility] [id number] [place] [address] 10 

May, 2010                                

 

                    [facility] [id number] [place] [address] 11 

Dec, 2009                                

 

                    [facility] [id number] [place] [address] 11 

Dec, 2009                                

 

                    [facility] [id number] [place] [address]                                 

 

                    [facility] [id number] [place] [address]                                 







IMMUNIZATIONS

No Known Immunizations



SOCIAL HISTORY

Never Assessed



REASON FOR VISIT





PLAN OF CARE





VITAL SIGNS





MEDICATIONS

No Known Medications



RESULTS

No Results



PROCEDURES

No Known procedures



INSTRUCTIONS





MEDICATIONS ADMINISTERED

No Known Medications



MEDICAL (GENERAL) HISTORY





                    Type                Description         Date

 

                    Medical History     asthma-dx at age 11-12  

 

                          Medical History           hypertension-hx of pre-eclam

psia with second pregnancy, was 

induced at 35 weeks                      

 

                    Medical History     cardiomyopathy-postpartum (non-ischemic)

 (Stephanie)  

 

                    Medical History     depression           

 

                    Medical History     CHF                  

 

                    Surgical History    tonsillectomy        

 

                    Surgical History    tubal               2014

 

                    Surgical History    tonsillectomy        

 

                          Surgical History          tubal [id number]-[id number] dev

eloped respiratory issues and heart

failure following the surgery           2011

 

                    Surgical History    echo-2010, 8/19/10, , 11  

 

                          Hospitalization History   PPD #4 for non-ischemic card

iomyopathy, respiratory 

distress due to pulmonary edema, ARF    2010

 

                    Hospitalization History surgeries

## 2020-08-18 NOTE — XMS REPORT
[facility]

                             Created on: 2020



Love Chery

External Reference #: [id number]

: 1985

Sex: Female



Demographics





                          Address                   [address] [place]  [address]

 

                          Preferred Language        Unknown

 

                          Marital Status            Unknown

 

                          Taoist Affiliation     Unknown

 

                          Race                      Unknown

 

                          Ethnic Group              Unknown





Author





                          Author                    Love SHETH

 

                          [facility]

 

                          Address                   [address] [place]  [address]



 

                          Phone                     [phone]







Care Team Providers





                    Care Team Member Name Role                Phone

 

                    MERYLEDGARDA        Unavailable         [phone]







PROBLEMS





          Type      Condition ICD9-CM Code KWU18-MN Code Onset Dates Condition S

tatus SNOMED 

Code

 

          Problem   Seasonal allergic rhinitis due to pollen           J30.1    

           Active    [id number]

 

                          Problem                   Heart failure, unspecified H

F chronicity, unspecified heart failure type

                          I50.9                     Active       [id number]

 

          Problem   Asthma              J45.909             Active    [id number]







ALLERGIES

No Information



ENCOUNTERS





                Encounter       Location        Date            Diagnosis

 

                          [facility]  [address] [facility] [id number]

[address] [place] [address] 

[address]                09 May, 2020              Dental abscess K04.7

 

                          [facility]  [address] [id number]

[address] [place] [address] 

[address]                              Acute left-sided low back pa

in without sciatica M54.5

 

                          [facility]  [address] [facility] [id number]

[address] [place] [address] 

[address]                05 Mar, 2020              Sore throat J02.9

 

                          [facility]     [address] [id number]

[address] [place] [address]

                          09 Sep, 2019               

 

                          [facility]     [address] [id number]

[address] [place] [address]

                                         

 

                          [facility]     [address] [id number]

[address] [place] [address]

                          17 May, 2019               

 

                          [facility]     [address] [facility] [id number]

[address] [place] [address]

                          14 May, 2019               

 

                          [facility]     [address] [facility] [id number]

[address] [place] [address]

                          28 Mar, 2019               

 

                          [facility]     [address] [address]

                          06 Mar, 2019              Morbid obesity E66.01 and Lo

calized edema R60.0

 

                          [place] [address] [address]

                          14 2019               

 

                          [facility]     [address] [address]

                          10 Charles, 2019               

 

                          [facility]     [address] [address]

                          10 Charles, 2019              Heart failure, unspecified H

F chronicity, unspecified heart failure

type I50.9

 

                          [facility]  [address] 

[address]                              Seasonal allergic rhinitis d

ue to pollen J30.1 and 

Asthma J45.909

 

                          [facility] WALK IN [place] [address] 

[address]                              Seasonal allergic rhinitis d

ue to pollen J30.1

 

                          [facility]  [address] 

[address]                19 Oct, 2016              Acute upper respiratory infe

ction, unspecified J06.9

 

                          [place] [address] [address]

                          05 Oct, 2016               

 

                          [facility] WALK IN [facility]  [address] 

[address]                13 May, 2016              Environmental allergies Z91.

09

 

                          [facility]     [address] [address]

                          24 Aug, 2015              Abscess 682.9

 

                          [place] [address] [address]

                          17 Aug, 2015              Mood disorder 296.90

 

                          [place] [address] [address]

                                        Screen for STD (sexually tra

nsmitted disease) V74.5

 

                          [place] [address] [address] [place] [address]

[address] [place] [address]

                          15 2015               

 

                          [facility]     [address] [id number]

[address] [place] [address]

                          13 2015              Depression 311

 

                          [facility]     [address] [id number]

[address] [place] [address]

                          13 2015              Major depressive disorder, r

ecurrent episode, severe 296.33 and No 

condition on Axis II V71.09

 

                          [facility]     [address] [id number]

[address] [place] [address]

                          10 2015               

 

                          [facility]     [address] [id number]

[address] [place] [address]

                          14 2015               

 

                          [facility]     [address] [id number]

[address] [place] [address]

                          13 2015               

 

                          [facility]     [address] [id number]

[address] [place] [address]

                          16 Sep, 2014               

 

                          [facility]     [address] [id number]

[address] [place] [address]

                          16 Sep, 2014               

 

                          [facility]     [address] [id number]

[address] [place] [address]

                          15 Sep, 2014               

 

                          [facility]     [address] [id number]

[address] [place] [address]

                          15 Sep, 2014               

 

                          [facility]     [address] [id number]

[address] [place] [address]

                          12 Sep, 2014               

 

                          [facility]     [address] [id number]

[address] [place] [address]

                          12 Sep, 2014               

 

                          [facility]     [address] [id number]

[address] [place] [address]

                          06 Aug, 2014               

 

                          [facility]     [address] [id number]

[address] [place] [address]

                          06 Aug, 2014               

 

                          [facility]     [address] [id number]

[address] [place] [address]

                                         

 

                          [facility]     [address] [id number]

[address] [place] [address]

                                         

 

                          [facility]     [address] [id number]

[address] [place] [address]

                          10 Adam, 2014               

 

                          [facility]     [address] [id number]

[address] [place] [address]

                          10 Adam, 2014               

 

                          [facility]     [address] [id number]

[address] [place] [address], [place] [address]

                          07 May, 2014               

 

                          [facility]     [address] [id number]

[address] [place] [address], [place] [address]

                          07 May, 2014               

 

                          [facility]     [address] [id number]

[address] [place] [address], [place] [address]

                          05 May, 2014               

 

                          [facility]     [address] [id number]

[address] [place] [address], [place] [address]

                          05 May, 2014               

 

                          [facility]     [address] [id number]

[address] [place] [address], [place] [address]

                                         

 

                          [facility]     [address] [id number]

[address] [place] [address], [place] [address]

                                         

 

                          [facility]     [address] [id number]

[address] [place] [address], [place] [address]

                          25 Mar, 2014               

 

                          [facility]     [address] [id number]

[address] [place] [address], [place] [address]

                          24 Mar, 2014               

 

                          [facility]     [address] [id number]

[address] [place] [address], [place] [address]

                          24 Mar, 2014               

 

                          [facility]     [address] [id number]

[address] [place] [address], [place] [address]

                          13 Mar, 2014               

 

                          [facility]     [address] [id number]

[address] [place] [address], [place] [address]

                          13 Mar, 2014               

 

                          [facility]     [address] [id number]

[address] [place] [address], [place] [address]

                                         

 

                          [facility]     [address] [id number]

[address] [place] [address], [place] [address]

                                         

 

                          [facility]     [address] [id number]

[address] [place] [address], [place] [address]

                                         

 

                          [facility]     [address] [id number]

[address] [place] [address], [place] [address]

                                         

 

                          [facility]     [address] [id number]

[address] [place] [address], [place] [address]

                                         

 

                          [facility]     [address] [id number]

[address] [place] [address], [place] [address]

                                         

 

                          [facility]     [address] [id number]

[address] [place] [address], [place] [address]

                                         

 

                          [facility]     [address] [id number]

[address] [place] [address], [place] [address]

                          19 Dec, 2013               

 

                          [facility]     [address] [id number]

[address] [place] [address], [place] [address]

                          19 Dec, 2013               

 

                          [facility]     [address] [id number]

[address] [place] [address], [place] [address]

                                         

 

                          [facility]     [address] [id number]

[address] [place] [address], [place] [address]

                                         

 

                          [facility]     [address] [id number]

[address] [place] [address], [place] [address]

                                         

 

                          [facility]     [address] [id number]

[address] [place] [address], [place] [address]

                                         

 

                          [facility]     [address] [id number]

[address] [place] [address], [place] [address]

                          27 Sep, 2013               

 

                          [facility]     [address] [id number]

[address] [place] [address], [place] [address]

                          05 Sep, 2013               

 

                          [facility]     [address] [id number]

[address] [place] [address], [place] [address]

                                         

 

                          [facility]     [address] [id number]

[address] [place] [address], [place] [address]

                                         

 

                          [facility]     [address] [id number]

[address] [place] [address]

                          05 2013               

 

                          [facility]     [address] [id number]

[address] [place] [address], [place] [address]

                                         

 

                          [facility]     [address] [id number]

[address] [place] [address], [place] [address]

                          31 May, 2013               

 

                          [facility]     [address] [id number]

[address] [place] [address], [place] [address]

                          06 May, 2013               

 

                          [facility]     [address] [id number]

[address] [place] [address], [place] [address]

                                         

 

                          [facility]     [address] [id number]

[address] [place] [address], [place] [address]

                          28 Mar, 2013               

 

                          [facility]     [address] [id number]

[address] [place] [address], [place] [address]

                          18 Mar, 2013               

 

                          [facility]     [address] [id number]

[address] [place] [address], [place] [address]

                          14 Mar, 2013               

 

                          [facility]     [address] [id number]

[address] [place] [address], [place] [address]

                          13 Mar, 2013               

 

                          [facility]     [address] [id number]

[address] [place] [address], [place] [address]

                          12 Mar, 2013               

 

                          [facility]     [address] [id number]

[address] [place] [address], [place] [address]

                          11 Mar, 2013               

 

                          [facility]     [address] [id number]

[address] [place] [address], [place] [address]

                          06 Mar, 2013               

 

                          [facility]     [address] [id number]

[address] [place] [address], [place] [address]

                          06 Mar, 2013               

 

                          [facility]     [address] [id number]

[address] [place] [address], [place] [address]

                          06 Mar, 2013               

 

                          [facility]     [address] [id number]

[address] [place] [address], [place] [address]

                          06 Mar, 2013               

 

                          [facility]     [address] [id number]

[address] [place] [address], [place] [address]

                          05 Mar, 2013               

 

                          [facility]     [address] [id number]

[address] [place] [address], [place] [address]

                                         

 

                          [facility]     [address] [id number]

[address] [place] [address], [place] [address]

                                         

 

                          [facility]     [address] [id number]

[address] [place] [address], [place] [address]

                                         

 

                          [facility]     [address] [id number]

[address] [place] [address], [place] [address]

                                         

 

                          [facility]     [address] [id number]

[address] [place] [address], [place] [address]

                                         

 

                    [facility]     [address] [id number] [place], 

[place] [id number] 20 Aug, 2012

                                         

 

                          [facility]     [address] [id number]

[address] [place] [address], [place] [address]

                                         

 

                          [facility]     [address] [id number]

[address] [place] [address], [place] [address]

                                         

 

                          [facility]     [address] [id number]

[address] [place] [address], [place] [address]

                                         

 

                          [facility]     [address] [id number]

[address] [place] [address], [place] [address]

                                         

 

                          [facility]     [address] [id number]

[address] [place] [address], [place] [address]

                                         

 

                          [facility]     [address] [id number]

[address] [place] [address], [place] [address]

                                         

 

                          [facility]     [address] [id number]

[address] [place] [address], [place] [address]

                                         

 

                          [facility]     [address] [id number]

[address] [place] [address], [place] [address]

                                         

 

                          [facility]     [address] [id number]

[address] [place] [address], [place] [address]

                          26 Mar, 2012               

 

                          [facility]     [address] [id number]

[address] [place] [address], [place] [address]

                                         

 

                          [facility]     [address] [id number]

[address] [place] [address], [place] [address]

                                         

 

                          [facility]     [address] [id number]

[address] [place] [address], [place] [address]

                          12 Dec, 2011               

 

                          [facility]     [address] [id number]

[address] [place] [address], [place] [address]

                                         

 

                          [facility]     [address] [id number]

[address] [place] [address], [place] [address]

                                         

 

                          [facility]     [address] [id number]

[address] [place] [address], [place] [address]

                                         

 

                          [facility]     [address] [id number]

[address] [place] [address], [place] [address]

                                         

 

                          [facility]     [address] [id number]

[address] [place] [address], [place] [address]

                          31 Oct, 2011               

 

                          [facility]     [address] [id number]

[address] [place] [address], [place] [address]

                          17 Oct, 2011               

 

                          [facility]     [address] [id number]

[address] [place] [address], [place] [address]

                          17 Oct, 2011               

 

                          [facility]     [address] [id number]

[address] [place] [address], [place] [address]

                          10 Oct, 2011               

 

                          [facility]     [address] [id number]

[address] [place] [address], [place] [address]

                          22 Dec, 2010               

 

                          [facility]     [address] [id number]

[address] [place] [address]

                                         

 

                          [facility]     [address] [id number]

[address] [place] [address], [place] [address]

                                         

 

                          [facility]     [address] [id number]

[address] [place] [address], [place] [address]

                          19 May, 2010               

 

                          [facility]     [address] [id number]

[address] [place] [address], [place] [address]

                          17 May, 2010               

 

                          [facility]     [address] [id number]

[address] [place] [address]

                          13 May, 2010               

 

                          [facility]     [address] [id number]

[address] [place] [address]

                          11 May, 2010               

 

                          [facility]     [address] [facility] [id number]

[address] [place] [id number]

                          10 May, 2010               

 

                          [facility]     [address] [facility] [id number]

[address] [place] [id number]

                          11 Dec, 2009               

 

                          [facility]     [address] [facility] [id number]

[address] [place] [id number]

                          11 Dec, 2009               

 

                          [facility]     [address] [facility] [id number]

[address] [place] [phone]

                                         

 

                          [facility]     [address] [facility] [id number]

[address] [place] [phone]

                                         







IMMUNIZATIONS

No Known Immunizations



SOCIAL HISTORY

Never Assessed



REASON FOR VISIT





PLAN OF CARE





VITAL SIGNS





MEDICATIONS

No Known Medications



RESULTS

No Results



PROCEDURES

No Known procedures



INSTRUCTIONS





MEDICATIONS ADMINISTERED

No Known Medications



MEDICAL (GENERAL) HISTORY





                    Type                Description         Date

 

                    Medical History     asthma-dx at age 11-12  

 

                          Medical History           hypertension-hx of pre-eclam

psia with second pregnancy, was 

induced at 35 weeks                      

 

                    Medical History     cardiomyopathy-postpartum (non-ischemic)

 (Stephanie)  

 

                    Medical History     depression           

 

                    Medical History     CHF                  

 

                    Surgical History    tonsillectomy        

 

                    Surgical History    tubal               2014

 

                    Surgical History    tonsillectomy        

 

                          Surgical History          tubal saneltyv-[id number]-zh dev

eloped respiratory issues and heart

failure following the surgery           2011

 

                    Surgical History    echo-2010, 8/19/10, , 11  

 

                          Hospitalization History   PPD #4 for non-ischemic card

iomyopathy, respiratory 

distress due to pulmonary edema, ARF    2010

 

                    Hospitalization History surgeries

## 2020-08-18 NOTE — XMS REPORT
[facility]

                             Created on: 2020



Love Chery

External Reference #: [id number]

: 1985

Sex: Female



Demographics





                          Address                   [address]

[place]  [address]

 

                          Preferred Language        Unknown

 

                          Marital Status            Unknown

 

                          Worship Affiliation     Unknown

 

                          Race                      Unknown

 

                          Ethnic Group              Unknown





Author





                          Author                    Love SHETH

 

                          [facility]

 

                          Address                   [address] [place]  [address]



 

                          Phone                     [phone]







Care Team Providers





                    Care Team Member Name Role                Phone

 

                    JACKIE SHETH        Unavailable         [phone]







PROBLEMS





          Type      Condition ICD9-CM Code TGP31-VJ Code Onset Dates Condition S

tatus SNOMED 

Code

 

          Problem   Seasonal allergic rhinitis due to pollen           J30.1    

           Active    24547171

 

                          Problem                   Heart failure, unspecified H

F chronicity, unspecified heart failure type

                          I50.9                     Active       02555534

 

          Problem   Asthma              J45.909             Active    140523300







ALLERGIES

No Information



ENCOUNTERS





                Encounter       Location        Date            Diagnosis

 

                          [facility] WALK IN [facility]  [address] [facility] [id number]

[address] [place] 

[address]                05 Mar, 2020              Sore throat J02.9

 

                    [facility] [address] [address] 09 

Sep, 2019                                

 

                    [facility] [address] [address]                                 

 

                    [facility] [address] [address] 17 

May, 2019                                

 

                    [facility] [address] [address] 14 

May, 2019                                

 

                    [facility] [address] [address] 28 

Mar, 2019                                

 

                    [facility] [address] [address] 06 

Mar, 2019                               Morbid obesity E66.01 and Localized gilmar

a R60.0

 

                    [facility] [address] [address]                                 

 

                    [facility] [address] [address] 10 

Charles, 2019                                

 

                    [place] [address] [address] 10 

Charles, 2019                               Heart failure, unspecified HF chronicity

, unspecified heart failure 

type I50.9

 

                          [facility]  [id number]

[address] [place] [address] 

[address]                              Seasonal allergic rhinitis d

ue to pollen J30.1 and 

Asthma J45.909

 

                          [facility]  [address] 

[address]                              Seasonal allergic rhinitis d

ue to pollen J30.1

 

                          [facility] WALK IN [address] 

[address]                19 Oct, 2016              Acute upper respiratory infe

ction, unspecified J06.9

 

                    [place] [address] [address] 05 

Oct, 2016                                

 

                          [facility] WALK IN [address] 

[address]                13 May, 2016              Environmental allergies Z91.

09

 

                    [place] [address] [address] 24 

Aug, 2015                               Abscess 682.9

 

                    [place] [address] [address] 17 

Aug, 2015                               Mood disorder 296.90

 

                    [place] [address] [address]                                Screen for STD (sexually transmitted dis

ease) V74.5

 

                    [place] [address] [address] 15 

Jul, 2015                                

 

                    [place] [address] [address]                                Depression 311

 

                    [place] [address] [address]                                Major depressive disorder, recurrent epi

sode, severe 296.33 and No 

condition on Axis II V71.09

 

                    [place] [address] [place] [id number] [place] [address] 10 

Jul,                                 

 

                    [facility] [address] [facility] [id number] [place] [address] 14 

2015                                

 

                    [facility] [address] [facility] [place] [address] 13 

2015                                

 

                    [facility] [address] [facility] [place] [address] 16 

Sep, 2014                                

 

                    [facility] [address] [facility] [place] [address] 16 

Sep, 2014                                

 

                    [facility] [address] [facility] [place] [address] 15 

Sep, 2014                                

 

                    [facility] [address] [facility] [place] [address] 15 

Sep, 2014                                

 

                    [facility] [address] [facility] [place] [address] 12 

Sep, 2014                                

 

                    [facility] [address] [facility] [place] [address] 12 

Sep, 2014                                

 

                    [facility] [address] [facility] [place] [address] 06 

Aug, 2014                                

 

                    [facility] [address] [facility] [place] [address] 06 

Aug, 2014                                

 

                    [facility] [address] [facility] [place] [address]                                 

 

                    [facility] [address] [facility] [place] [address]                                 

 

                    [facility] [address] [facility] [place] [address] 10 

Adam, 2014                                

 

                    [facility] [address] [facility] [place] [address] 10 

Adam, 2014                                

 

                    [facility] [address] [facility] [place] [address] 07 

May, 2014                                

 

                    [facility] [address] [facility] [place] [address] 07 

May, 2014                                

 

                    [facility] [address] [facility] [place] [address] 05 

May, 2014                                

 

                    [facility] [address] [facility] [place] [address] 05 

May, 2014                                

 

                    [facility] [address] [facility] [place] [address]                                 

 

                    [facility] [address] [facility] [place] [address]                                 

 

                    [facility] [address] [facility] [place] [address] 25 

Mar, 2014                                

 

                    [facility] [address] [facility] [place] [address] 24 

Mar, 2014                                

 

                    [facility] [address] [facility] [place] [address] 24 

Mar, 2014                                

 

                    [facility] [address] [facility] [place] [address] 13 

Mar, 2014                                

 

                    [facility] [address] [facility] [place] [address] 13 

Mar, 2014                                

 

                    [facility] [address] [facility] [place] [address]                                 

 

                    [facility] [address] [facility] [place] [address]                                 

 

                    [facility] [address] [facility] [place] [address]                                 

 

                    [facility] [address] [facility] [place] [address]                                 

 

                    [facility] [address] [facility] [place] [address]                                 

 

                    [facility] [address] [facility] [place] [address]                                 

 

                    [facility] [address] [facility] [place] [address]                                 

 

                    [facility] [address] [facility] [place] [address] 19 

Dec, 2013                                

 

                    [facility] [address] [facility] [place] [address] 19 

Dec, 2013                                

 

                    [facility] [address] [facility] [place] [address]                                 

 

                    [facility] [address] [facility] [place] [address]                                 

 

                    [facility] [address] [facility] [place] [address]                                 

 

                    [facility] [address] [facility] [place] [address]                                 

 

                    [facility] [address] [facility] [place] [address] 27 

Sep, 2013                                

 

                    [facility] [address] [facility] [place] [address] 05 

Sep, 2013                                

 

                    [facility] [address] [facility] [place] [address]                                 

 

                    [facility] [address] [facility] [place] [address]                                 

 

                    [facility] [address] [facility] [place] [address]                                 

 

                    [facility] [address] [facility] [place] [address]                                 

 

                    [facility] [address] [facility] [place] [address] 31 

May, 2013                                

 

                    [facility] [address] [facility] [place] [address] 06 

May, 2013                                

 

                    [facility] [address] [facility] [place] [address]                                 

 

                    [facility] [address] [facility] [place] [address] 28 

Mar, 2013                                

 

                    [facility] [address] [facility] [place] [address] 18 

Mar, 2013                                

 

                    [facility] [address] [facility] [place] [address] 14 

Mar, 2013                                

 

                    [facility] [address] [facility] [place] [address] 13 

Mar, 2013                                

 

                    [facility] [address] [facility] [place] [address] 12 

Mar, 2013                                

 

                    [facility] [address] [facility] [place] [address] 11 

Mar, 2013                                

 

                    [facility] [address] [facility] [place] [address] 06 

Mar, 2013                                

 

                    [facility] [address] [facility] [place] [address] 06 

Mar, 2013                                

 

                    [facility] [address] [facility] [place] [address] 06 

Mar, 2013                                

 

                    [facility] [address] [facility] [place] [address] 06 

Mar, 2013                                

 

                    [facility] [address] [facility] [place] [address] 05 

Mar, 2013                                

 

                    [facility] [address] [facility] [place] [address]                                 

 

                    [facility] [address] [facility] [place] [address]                                 

 

                    [facility] [address] [facility] [place] [address]                                 

 

                    [facility] [address] [facility] [place] [address]                                 

 

                    [facility] [address] [facility] [place] [address]                                 

 

                [facility] [address] [place] [address] 20

 Aug, 2012     

 

                    [facility] [address] [facility] [place] [address]                                 

 

                    [facility] [address] [facility] [place] [address]                                 

 

                    [facility] [address] [facility] [place] [address]                                 

 

                    [facility] [address] [facility] [place] [address]                                 

 

                    [facility] [address] [facility] [place] [address]                                 

 

                    [facility] [address] [facility] [place] [address]                                 

 

                    [facility] [address] [facility] [place] [address]                                 

 

                    [facility] [address] [facility] [place] [address]                                 

 

                    [facility] [address] [facility] [place] [address] 26 

Mar, 2012                                

 

                    [facility] [id number] [place] [address]                                 

 

                    [facility] [id number] [place] [address]                                 

 

                    [facility] [address] [facility] [place] [address] 12 

Dec, 2011                                

 

                    [facility] [address] [facility] [place] [address]                                 

 

                    [facility] [id number] [place] [address]                                 

 

                    [facility] [address] [facility] [place] [address] 16 

2011                                

 

                    [facility] [address] [facility] [place] [address]                                 

 

                    [facility] [id number] [place] [address] 31 

Oct, 2011                                

 

                    [facility] [address] [facility] [place] [address] 17 

Oct, 2011                                

 

                    [facility] [address] [facility] [place] [address] 17 

Oct, 2011                                

 

                    [facility] [address] [facility] [place] [address] 10 

Oct, 2011                                

 

                    [facility] [id number] [place] [address] 22 

Dec, 2010                                

 

                    [facility] [address] [facility] [place] [address]                                 

 

                    [facility] [id number] [place] [address]                                 

 

                    [facility] [id number] [place] [address] 19 

May, 2010                                

 

                    [facility] [id number] [place] [address] 17 

May, 2010                                

 

                    [facility] [id number] [place] [address] 13 

May, 2010                                

 

                    [facility] [id number] [place] [address] 11 

May, 2010                                

 

                    [facility] [id number] [place] [address] 10 

May, 2010                                

 

                    [facility] [id number] [place] [address] 11 

Dec, 2009                                

 

                    [facility] [id number] [place] [address] 11 

Dec, 2009                                

 

                    [facility] [id number] [place] [address]                                 

 

                    [facility] [id number] [place] [address]                                 







IMMUNIZATIONS

No Known Immunizations



SOCIAL HISTORY

Never Assessed



REASON FOR VISIT





PLAN OF CARE





VITAL SIGNS





MEDICATIONS

No Known Medications



RESULTS

No Results



PROCEDURES

No Known procedures



INSTRUCTIONS





MEDICATIONS ADMINISTERED

No Known Medications



MEDICAL (GENERAL) HISTORY





                    Type                Description         Date

 

                    Medical History     asthma-dx at age 11-12  

 

                          Medical History           hypertension-hx of pre-eclam

psia with second pregnancy, was 

induced at 35 weeks                      

 

                    Medical History     cardiomyopathy-postpartum (non-ischemic)

 (Stephanie)  

 

                    Medical History     depression           

 

                    Medical History     CHF                  

 

                    Surgical History    tonsillectomy        

 

                    Surgical History    tubal               2014

 

                    Surgical History    tonsillectomy        

 

                          Surgical History          tubal [id number] dev

eloped respiratory issues and heart

failure following the surgery           2011

 

                    Surgical History    echo-2010, 8/19/10, , 11  

 

                          Hospitalization History   PPD #4 for non-ischemic card

iomyopathy, respiratory 

distress due to pulmonary edema, ARF    2010

 

                    Hospitalization History surgeries

## 2020-10-22 ENCOUNTER — HOSPITAL ENCOUNTER (OUTPATIENT)
Dept: HOSPITAL 75 - CARD | Age: 35
End: 2020-10-22
Attending: INTERNAL MEDICINE
Payer: SELF-PAY

## 2020-10-22 DIAGNOSIS — I51.7: ICD-10-CM

## 2020-10-22 DIAGNOSIS — Z72.0: ICD-10-CM

## 2020-10-22 DIAGNOSIS — I50.22: Primary | ICD-10-CM

## 2020-10-22 PROCEDURE — 93306 TTE W/DOPPLER COMPLETE: CPT

## 2021-08-14 ENCOUNTER — HOSPITAL ENCOUNTER (EMERGENCY)
Dept: HOSPITAL 75 - ER | Age: 36
Discharge: HOME | End: 2021-08-14
Payer: COMMERCIAL

## 2021-08-14 VITALS — BODY MASS INDEX: 40.4 KG/M2 | HEIGHT: 65 IN | WEIGHT: 242.51 LBS

## 2021-08-14 VITALS — DIASTOLIC BLOOD PRESSURE: 90 MMHG | SYSTOLIC BLOOD PRESSURE: 131 MMHG

## 2021-08-14 DIAGNOSIS — F17.210: ICD-10-CM

## 2021-08-14 DIAGNOSIS — U07.1: Primary | ICD-10-CM

## 2021-08-14 PROCEDURE — 99282 EMERGENCY DEPT VISIT SF MDM: CPT

## 2021-08-14 PROCEDURE — 87636 SARSCOV2 & INF A&B AMP PRB: CPT

## 2021-08-14 NOTE — ED COUGH/URI
General


Chief Complaint:  Oral/Throat Problems


Stated Complaint:  COVID EXPOSURE COUGH


Nursing Triage Note:  


Pt ambulatory into ER with complaint of needing Covid tested due to Covid 


Exposure/Sore Throat x1 week. Pts friend who has been at her house for the last 


week tested positive in our ER earlier today.


Source:  patient





History of Present Illness


Date Seen by Provider:  Aug 14, 2021


Time Seen by Provider:  03:44


Initial Comments


PT ARRIVES VIA POV WITH NEPHEW, WHO IS ALSO BEING SEEN FOR SAME


HAS BEEN AROUND A FRIEND ALL WEEK, WHO TESTED + FOR COVID EARLIER TODAY/FRIDAY 08/13/21, SO PT AND NEPHEW ARE HERE TO GET A COVID-19 TEST. 


PT ALSO HERE FOR WORK NOTE


PT STATES SHE HAS HAD A SORE THROAT X 1 


NO OTHER SYMPTOMS





NO CHRONIC ILLNESSES, PER PT, BUT ASTHMA IS LISTED ON PREVIOUS RECORDS


PT SMOKES 1/2 PPD





PCP: BALJINDER





Allergies and Home Medications


Allergies


Coded Allergies:  


     paroxetine (Unverified  Allergy, Unknown, 5/5/14)


Uncoded Allergies:  


     ANESTHETIC (Allergy, Unknown, 5/5/14)





Home Medications


Amoxicillin 500 Mg Capsule, 500 MG PO TID


   Prescribed by: SABINO ALVAREZ on 8/18/20 2123


Cefdinir 300 Mg Capsule, 300 MG PO BID


   Prescribed by: TIFFANY GILBERT on 1/10/19 1654


Metoprolol Succinate 25 Mg Tab.er.24h, 25 MG PO DAILY


   Prescribed by: TIFFANY GILBERT on 1/10/19 1654


Penicillin V Potassium 500 Mg Tablet, 500 MG PO QID


   Prescribed by: FELICITY TERESA on 2/16/20 1412


Sacubitril/Valsartan 1 Each Tablet, 1 TAB PO BID


   Prescribed by: TIFFANY GILBERT on 1/10/19 1654


Tramadol HCl 50 Mg Tablet, 50 MG PO Q6H PRN for PAIN


   Prescribed by: FELICITY TERESA on 2/16/20 1412





Review of Systems


Review of Systems


Constitutional:  no symptoms reported


EENTM:  see HPI, throat pain


Respiratory:  no symptoms reported


Cardiovascular:  no symptoms reported


Gastrointestinal:  no symptoms reported


Genitourinary:  no symptoms reported


Musculoskeletal:  no symptoms reported


Skin:  no symptoms reported


Psychiatric/Neurological:  No Symptoms Reported


Hematologic/Lymphatic:  No Symptoms Reported


Immunological/Allergic:  no symptoms reported





Past Medical-Social-Family Hx


Patient Social History


Tobacco Use?:  Yes (1/2 PPD)


Tobacco type used:  Cigarettes


Smoking Status:  Current Everyday Smoker


Use of E-Cig and/or Vaping dev:  No


Substance use?:  No


Alcohol Use?:  No


Pt feels they are or have been:  No





Immunizations Up To Date


PED Vaccines UTD:  Yes


Influenza Vaccine Up-to-Date:  No; Not Current





Seasonal Allergies


Seasonal Allergies:  No





Past Medical History


Surgeries:  Yes


Adenoidectomy, Tonsillectomy, Tubal Ligation


Respiratory:  Yes (VERY DIFFICULT TO INTUBATE)


Asthma


Cardiac:  No


Neurological:  No


Reproductive Disorders:  Yes


GYN History:  Tubal Ligation


Genitourinary:  No


Gastrointestinal:  No


Musculoskeletal:  No


Endocrine:  No


HEENT:  No


Cancer:  No


Psychosocial:  No


Integumentary:  No


Blood Disorders:  No





Family Medical History





Hypertension


  19 FATHER


  19 MOTHER


Myocardial infarction


  Maternal Grandmother


  Maternal Grandfather


Heart Disease, Other Conditions/Hx





Physical Exam





Vital Signs - First Documented








 8/14/21





 03:35


 


Temp 36.0


 


Pulse 61


 


Resp 20


 


B/P (MAP) 136/91 (106)


 


Pulse Ox 97


 


O2 Delivery Room Air





Capillary Refill : Less Than 3 Seconds


Height: 5'5.00"


Weight: 251lbs. 6.0oz. [id number]; 40.00 BMI


Method:Estimated





Progress/Results/Core Measures


Suspected Sepsis


SIRS


Temperature: 


Pulse: 61 


Respiratory Rate: 20


 


Blood Pressure 136 /91 


Mean: 106





Results/Orders


Lab Results





Laboratory Tests








Test


 8/14/21


03:45 Range/Units


 


 


SARS-CoV-2 RNA (RT-PCR) Detected H Not Detecte  








My Orders





Orders - ARLET HIDALGO DO


Covid 19 Inhouse Test (8/14/21 03:43)





Vital Signs/I&O











 8/14/21





 03:35


 


Temp 36.0


 


Pulse 61


 


Resp 20


 


B/P (MAP) 136/91 (106)


 


Pulse Ox 97


 


O2 Delivery Room Air





Capillary Refill : Less Than 3 Seconds








Blood Pressure Mean:                    106











Departure


Impression





   Primary Impression:  


   COVID-19 virus infection


Disposition:  01 HOME, SELF-CARE


Condition:  Stable





Departure-Patient Inst.


Referrals:  


[facility] (PCP/Family)


Primary Care Physician


Patient Instructions:  Preventing the Spread of an Infectious Disease, COVID-19 

(DC), REGEN-COV (casirivimab and imdevimab) FDA Fact Sheet, Recovery After 

COVID-19





Add. Discharge Instructions:  


LOTS OF CLEAR LIQUIDS





TYLENOL 1 GRAM / MOTRIN 800 MG 4 TIMES A DAY FOR PAIN OR FEVER





OVER THE COUNTER MUCINEX DM FOR COUGH





FOLLOW UP ON MONDAY MORNING TO SCHEDULE REGEN-COV INFUSION





USE INHALER AND DECADRON IF YOU START HAVING ASTHMA SYMPTOMS, DIFFICULTY 

BREATHING, ETC. 


RETURN TO ER IF YOUR SYMPTOMS BECOME MUCH WORSE





QUARANTINE YOURSELF AND ALL HOUSEHOLD AND CLOSE CONTACTS FOR 2 WEEKS 





All discharge instructions reviewed with patient and/or family. Voiced 

understanding.


Scripts


Dexamethasone (Decadron) 6 Mg Tablet


6 MG PO DAILY, #10 TAB


   Prov: ARLET HIDALGO DO         8/14/21 


Albuterol Sulfate (PROAIR HFA) 1 Puff Puff


2 PUFF IH Q4H, #1 EA


   1 PUFF = 90 MCG


   Prov: ARLET HIDALGO DO         8/14/21


Work/School Note:  Work Release Form   Date Seen in the Emergency Department:  

Aug 14, 2021


   Return to Work:  Aug 29, 2021











ARLET HIDALGO DO                 Aug 14, 2021 04:16

## 2021-08-17 ENCOUNTER — HOSPITAL ENCOUNTER (OUTPATIENT)
Dept: HOSPITAL 75 - INFUSION | Age: 36
Discharge: HOME | End: 2021-08-17
Attending: EMERGENCY MEDICINE
Payer: COMMERCIAL

## 2021-08-17 VITALS — HEIGHT: 62.01 IN | BODY MASS INDEX: 48.51 KG/M2 | WEIGHT: 266.98 LBS

## 2021-08-17 VITALS — SYSTOLIC BLOOD PRESSURE: 124 MMHG | DIASTOLIC BLOOD PRESSURE: 80 MMHG

## 2021-08-17 VITALS — DIASTOLIC BLOOD PRESSURE: 87 MMHG | SYSTOLIC BLOOD PRESSURE: 127 MMHG

## 2021-08-17 DIAGNOSIS — U07.1: ICD-10-CM

## 2021-08-17 DIAGNOSIS — Z23: Primary | ICD-10-CM

## 2022-08-28 ENCOUNTER — HOSPITAL ENCOUNTER (EMERGENCY)
Dept: HOSPITAL 75 - ER | Age: 37
Discharge: HOME | End: 2022-08-28
Payer: COMMERCIAL

## 2022-08-28 VITALS — WEIGHT: 260.15 LBS | HEIGHT: 64.96 IN | BODY MASS INDEX: 43.34 KG/M2

## 2022-08-28 VITALS — SYSTOLIC BLOOD PRESSURE: 130 MMHG | DIASTOLIC BLOOD PRESSURE: 89 MMHG

## 2022-08-28 DIAGNOSIS — I50.9: Primary | ICD-10-CM

## 2022-08-28 DIAGNOSIS — F15.20: ICD-10-CM

## 2022-08-28 DIAGNOSIS — Z20.822: ICD-10-CM

## 2022-08-28 LAB
ALBUMIN SERPL-MCNC: 3.9 GM/DL (ref 3.2–4.5)
ALP SERPL-CCNC: 75 U/L (ref 40–136)
ALT SERPL-CCNC: 33 U/L (ref 0–55)
APTT BLD: 30 SEC (ref 24–35)
BASOPHILS # BLD AUTO: 0.1 10^3/UL (ref 0–0.1)
BASOPHILS NFR BLD AUTO: 1 % (ref 0–10)
BILIRUB SERPL-MCNC: 1 MG/DL (ref 0.1–1)
BUN/CREAT SERPL: 8
CALCIUM SERPL-MCNC: 9.1 MG/DL (ref 8.5–10.1)
CHLORIDE SERPL-SCNC: 106 MMOL/L (ref 98–107)
CO2 SERPL-SCNC: 21 MMOL/L (ref 21–32)
CREAT SERPL-MCNC: 1.04 MG/DL (ref 0.6–1.3)
EOSINOPHIL # BLD AUTO: 0.4 10^3/UL (ref 0–0.3)
EOSINOPHIL NFR BLD AUTO: 3 % (ref 0–10)
GFR SERPLBLD BASED ON 1.73 SQ M-ARVRAT: 70 ML/MIN
GLUCOSE SERPL-MCNC: 105 MG/DL (ref 70–105)
HCT VFR BLD CALC: 42 % (ref 35–52)
HGB BLD-MCNC: 13.7 G/DL (ref 11.5–16)
INR PPP: 1 (ref 0.8–1.4)
LYMPHOCYTES # BLD AUTO: 2.4 10^3/UL (ref 1–4)
LYMPHOCYTES NFR BLD AUTO: 19 % (ref 12–44)
MAGNESIUM SERPL-MCNC: 2 MG/DL (ref 1.6–2.4)
MANUAL DIFFERENTIAL PERFORMED BLD QL: YES
MCH RBC QN AUTO: 28 PG (ref 25–34)
MCHC RBC AUTO-ENTMCNC: 33 G/DL (ref 32–36)
MCV RBC AUTO: 87 FL (ref 80–99)
MONOCYTES # BLD AUTO: 1 10^3/UL (ref 0–1)
MONOCYTES NFR BLD AUTO: 8 % (ref 0–12)
NEUTROPHILS # BLD AUTO: 8.9 10^3/UL (ref 1.8–7.8)
NEUTROPHILS NFR BLD AUTO: 69 % (ref 42–75)
PLATELET # BLD: 319 10^3/UL (ref 130–400)
PMV BLD AUTO: 12.8 FL (ref 9–12.2)
POTASSIUM SERPL-SCNC: 3.9 MMOL/L (ref 3.6–5)
PROT SERPL-MCNC: 7.8 GM/DL (ref 6.4–8.2)
PROTHROMBIN TIME: 13.4 SEC (ref 12.2–14.7)
SODIUM SERPL-SCNC: 139 MMOL/L (ref 135–145)
WBC # BLD AUTO: 12.9 10^3/UL (ref 4.3–11)

## 2022-08-28 PROCEDURE — 83735 ASSAY OF MAGNESIUM: CPT

## 2022-08-28 PROCEDURE — 93041 RHYTHM ECG TRACING: CPT

## 2022-08-28 PROCEDURE — 85379 FIBRIN DEGRADATION QUANT: CPT

## 2022-08-28 PROCEDURE — 87636 SARSCOV2 & INF A&B AMP PRB: CPT

## 2022-08-28 PROCEDURE — 71045 X-RAY EXAM CHEST 1 VIEW: CPT

## 2022-08-28 PROCEDURE — 85610 PROTHROMBIN TIME: CPT

## 2022-08-28 PROCEDURE — 80053 COMPREHEN METABOLIC PANEL: CPT

## 2022-08-28 PROCEDURE — 83874 ASSAY OF MYOGLOBIN: CPT

## 2022-08-28 PROCEDURE — 71275 CT ANGIOGRAPHY CHEST: CPT

## 2022-08-28 PROCEDURE — 93005 ELECTROCARDIOGRAM TRACING: CPT

## 2022-08-28 PROCEDURE — 85027 COMPLETE CBC AUTOMATED: CPT

## 2022-08-28 PROCEDURE — 84484 ASSAY OF TROPONIN QUANT: CPT

## 2022-08-28 PROCEDURE — 36415 COLL VENOUS BLD VENIPUNCTURE: CPT

## 2022-08-28 PROCEDURE — 83880 ASSAY OF NATRIURETIC PEPTIDE: CPT

## 2022-08-28 PROCEDURE — 85730 THROMBOPLASTIN TIME PARTIAL: CPT

## 2022-08-28 NOTE — DIAGNOSTIC IMAGING REPORT
INDICATION: Elevated d-dimer. Shortness of breath.



EXAMINATION: CT angiogram of the chest, 08/28/2022.



COMPARISON: 01/08/2019.



FINDINGS: There are no central or proximal segmental pulmonary

emboli. Minimally prominent lymph nodes within the AP window

noted with mild adenopathy noted in the right hilum as well.

There are small bilateral pleural effusions. There is no

significant pericardial effusion. Within the lungs respiratory

motion limits evaluation. Vague patchy airspace opacities noted

within the posterior aspect of the right lower lobe and similar

findings noted in the left lower lobe. These findings could be

due to atelectasis with early infiltrates not excluded. There is

no pneumothorax.



The visualized upper abdominal structures demonstrate findings of

suggested right heart failure or strain with reflux of contrast

into the hepatic vessels and IVC. There is incompletely imaged

cholelithiasis. There is no acute osseous abnormality.



IMPRESSION:

1. No central or proximal segmental pulmonary embolus.

2. Scattered areas of atelectasis versus early infiltrate in the

lower lobes with small bilateral pleural effusions. 

3. Nonspecific minimally prominent lymph nodes in the right hilum

and AP window. These could be followed to assure resolution. 

4. Findings suggestive of right heart strain.

5. Cholelithiasis. 



Dictated by: 



  Dictated on workstation # [id number]

## 2022-08-28 NOTE — ED CHEST PAIN
General


Stated Complaint:  CP,SOB


Source:  patient


Exam Limitations:  no limitations





History of Present Illness


Date Seen by Provider:  Aug 28, 2022


Time Seen by Provider:  14:22


Initial Comments


This is a 37-year-old female who presented to the ER via POV from [facility] for complaints of chest pain, shortness of breath that started 

around 0500 this morning.





Allergies and Home Medications


Allergies


Coded Allergies:  


     paroxetine (Unverified  Allergy, Unknown, 5/5/14)


Uncoded Allergies:  


     ANESTHETIC (Allergy, Unknown, 5/5/14)





Patient Home Medication List


Albuterol Sulfate (Proair Hfa) 1 Puff Puff, 2 PUFF IH Q4H


   Prescribed by: ARLET HIDALGO on 8/14/21 0545


Amoxicillin (Amoxicillin) 500 Mg Capsule, 500 MG PO TID


   Prescribed by: SABINO ALVAREZ on 8/18/20 2123


Cefdinir (Cefdinir) 300 Mg Capsule, 300 MG PO BID


   Prescribed by: TIFFANY GILBERT on 1/10/19 1654


Dexamethasone (Decadron) 6 Mg Tablet, 6 MG PO DAILY


   Prescribed by: ARLET HIDALGO on 8/14/21 0545


Metoprolol Succinate (Metoprolol Succinate) 25 Mg Tab.er.24h, 25 MG PO DAILY


   Prescribed by: TIFFANY GILBERT on 1/10/19 1654


Penicillin V Potassium (Penicillin V Potassium) 500 Mg Tablet, 500 MG PO QID


   Prescribed by: FELICITY TERESA on 2/16/20 1412


Sacubitril/Valsartan (Entresto 24 mg-26 mg Tablet) 1 Each Tablet, 1 TAB PO BID


   Prescribed by: TIFFANY GILBERT on 1/10/19 1654


Tramadol HCl (Tramadol HCl) 50 Mg Tablet, 50 MG PO Q6H PRN for PAIN


   Prescribed by: FELICITY TERESA on 2/16/20 1412





Past Medical-Social-Family Hx


Immunizations Up To Date


PED Vaccines UTD:  Yes





Seasonal Allergies


Seasonal Allergies:  No





Past Medical History


Surgeries:  Yes


Adenoidectomy, Tonsillectomy, Tubal Ligation


Respiratory:  Yes (VERY DIFFICULT TO INTUBATE)


Asthma


Cardiac:  No


Neurological:  No


Reproductive Disorders:  Yes


GYN History:  Tubal Ligation


Genitourinary:  No


Gastrointestinal:  No


Musculoskeletal:  No


Endocrine:  No


HEENT:  No


Cancer:  No


Psychosocial:  No


Integumentary:  No


Blood Disorders:  No





Family Medical History





Hypertension


  19 FATHER


  19 MOTHER


Myocardial infarction


  Maternal Grandmother


  Maternal Grandfather


Heart Disease, Other Conditions/Hx





Physical Exam


Vital Signs





Vital Signs - First Documented








 8/28/22





 14:15


 


Temp 36.7


 


Pulse 85


 


Resp 18


 


B/P (MAP) 120/99 (106)


 


Pulse Ox 99


 


O2 Delivery Room Air





Capillary Refill :


Height, Weight, BMI


Height: 5'5.00"


Weight: 251lbs. 6.0oz. 113.047285qx; 40.00 BMI


Method:Estimated





Progress/Results/Core Measures


Results/Orders


Lab Results





Laboratory Tests








Test


 8/28/22


14:25 8/28/22


14:28 Range/Units


 


 


White Blood Count


 12.9 H


 


 4.3-11.0


10^3/uL


 


Red Blood Count


 4.84 


 


 3.80-5.11


10^6/uL


 


Hemoglobin 13.7   11.5-16.0  g/dL


 


Hematocrit 42   35-52  %


 


Mean Corpuscular Volume 87   80-99  fL


 


Mean Corpuscular Hemoglobin 28   25-34  pg


 


Mean Corpuscular Hemoglobin


Concent 33 


 


 32-36  g/dL





 


Red Cell Distribution Width 16.0 H  10.0-14.5  %


 


Platelet Count


 319 


 


 130-400


10^3/uL


 


Mean Platelet Volume 12.8 H  9.0-12.2  fL


 


Immature Granulocyte % (Auto) 1    %


 


Neutrophils (%) (Auto) 69   42-75  %


 


Lymphocytes (%) (Auto) 19   12-44  %


 


Monocytes (%) (Auto) 8   0-12  %


 


Eosinophils (%) (Auto) 3   0-10  %


 


Basophils (%) (Auto) 1   0-10  %


 


Neutrophils # (Auto)


 8.9 H


 


 1.8-7.8


10^3/uL


 


Lymphocytes # (Auto)


 2.4 


 


 1.0-4.0


10^3/uL


 


Monocytes # (Auto)


 1.0 


 


 0.0-1.0


10^3/uL


 


Eosinophils # (Auto)


 0.4 H


 


 0.0-0.3


10^3/uL


 


Basophils # (Auto)


 0.1 


 


 0.0-0.1


10^3/uL


 


Immature Granulocyte # (Auto)


 0.1 


 


 0.0-0.1


10^3/uL


 


Prothrombin Time 13.4   12.2-14.7  SEC


 


INR Comment 1.0   0.8-1.4  


 


Activated Partial


Thromboplast Time 30 


 


 24-35  SEC





 


D-Dimer


 0.86 H


 


 0.00-0.49


UG/ML


 


Sodium Level 139   135-145  MMOL/L


 


Potassium Level 3.9   3.6-5.0  MMOL/L


 


Chloride Level 106     MMOL/L


 


Carbon Dioxide Level 21   21-32  MMOL/L


 


Anion Gap 12   5-14  MMOL/L


 


Blood Urea Nitrogen 8   7-18  MG/DL


 


Creatinine


 1.04 


 


 0.60-1.30


MG/DL


 


Estimat Glomerular Filtration


Rate 70 


 


  





 


BUN/Creatinine Ratio 8    


 


Glucose Level 105     MG/DL


 


Calcium Level 9.1   8.5-10.1  MG/DL


 


Corrected Calcium 9.2   8.5-10.1  MG/DL


 


Magnesium Level 2.0   1.6-2.4  MG/DL


 


Total Bilirubin 1.0   0.1-1.0  MG/DL


 


Aspartate Amino Transf


(AST/SGOT) 34 


 


 5-34  U/L





 


Alanine Aminotransferase


(ALT/SGPT) 33 


 


 0-55  U/L





 


Alkaline Phosphatase 75     U/L


 


Myoglobin


 59.4 


 


 10.0-92.0


NG/ML


 


Troponin I < 0.028   <0.028  NG/ML


 


B-Type Natriuretic Peptide 1093.6 H  <100.0  PG/ML


 


Total Protein 7.8   6.4-8.2  GM/DL


 


Albumin 3.9   3.2-4.5  GM/DL


 


Influenza Type A (RT-PCR)  Not Detected  Not Detecte  


 


Influenza Type B (RT-PCR)  Not Detected  Not Detecte  


 


SARS-CoV-2 RNA (RT-PCR)  Not Detected  Not Detecte  








My Orders





Orders - BLANE AGUIRRE APRN


Fibrin Degradation Products (8/28/22 14:20)


Bnp Chato (8/28/22 14:20)


Morphine  Injection (Morphine  Injection (8/28/22 14:20)


Aspirin Chewable Tablet (Baby Aspirin Ch (8/28/22 14:30)


Ct Angio Chest W (8/28/22 15:03)


Iohexol Injection (Omnipaque 350 Mg/Ml 1 (8/28/22 15:30)


Received Contrast (Hold Metformin- Contr (8/28/22 15:30)


Sodium Chloride Flush (Catheter Flush Sy (8/28/22 15:30)


Ns (Ivpb) (Sodium Chloride 0.9% Ivpb Bag (8/28/22 15:30)


Potassium Chloride (Tablet) (K Dur Table (8/28/22 15:30)


Furosemide  Injection (Lasix  Injection) (8/28/22 15:30)





Medications Given in ED





Current Medications








 Medications  Dose


 Ordered  Sig/Ernie


 Route  Start Time


 Stop Time Status Last Admin


Dose Admin


 


 Aspirin  324 mg  ONCE  ONCE


 PO  8/28/22 14:30


 8/28/22 14:31 DC 8/28/22 14:31


324 MG


 


 Furosemide  20 mg  ONCE  ONCE


 IVP  8/28/22 15:30


 8/28/22 15:31 DC 8/28/22 15:56


20 MG


 


 Iohexol  100 ml  ONCE  ONCE


 IV  8/28/22 15:30


 8/28/22 15:31 DC 8/28/22 15:26


85 ML


 


 Potassium Chloride  20 meq  ONCE  ONCE


 PO  8/28/22 15:30


 8/28/22 15:31 DC 8/28/22 15:56


20 MEQ


 


 Sodium Chloride  100 ml  ONCE  ONCE


 IV  8/28/22 15:30


 8/28/22 15:31 DC 8/28/22 15:26


70 ML








Vital Signs/I&O











 8/28/22





 14:15


 


Temp 36.7


 


Pulse 85


 


Resp 18


 


B/P (MAP) 120/99 (106)


 


Pulse Ox 99


 


O2 Delivery Room Air











Departure


Impression





   Primary Impression:  


   CHF exacerbation


   Additional Impression:  


   Methamphetamine dependence, episodic


Disposition:  01 HOME, SELF-CARE


Condition:  Improved





Departure-Patient Inst.


Decision time for Depature:  16:29


Referrals:  


[facility] (PCP/Family)


Primary Care Physician


Patient Instructions:  Heart Failure ED





Add. Discharge Instructions:  


Plan: 


1. We will start your Lasix and Potassium as you were previously taking, take as

directed.


2.  Stop using methamphetamine, energy drinks, high volumes of caffeine as this 

is continuing to place strain on your heart.


3.  Call Dr. Brown's office first thing tomorrow and schedule close follow-up.


4.  Return to the ER if you have any new, concerning, worsening symptoms.


Scripts


Potassium Chloride (Potassium Chloride) 10 Meq Tab.er.prt


10 MEQ PO DAILY for 7 Days, #7 TAB 0 Refills


   Prov: BLANE AGUIRRE         8/28/22 


Furosemide (Furosemide) 20 Mg Tablet


10 MG PO DAILY for 7 Days, #7 TAB 0 Refills


   Prov: BLANE AGUIRRE APRN         8/28/22











BLANE AGUIRRE           Aug 28, 2022 14:22

## 2022-08-28 NOTE — DIAGNOSTIC IMAGING REPORT
INDICATION: Chest pain.



EXAMINATION: Chest, 08/28/2022.



COMPARISON: 05/05/2014.



FINDINGS: There is cardiomegaly with mild prominence of the

pulmonary vasculature. There are no infiltrates or effusions. No

pneumothorax.



IMPRESSION: No acute cardiopulmonary process. 



Dictated by: 



  Dictated on workstation # [id number]

## 2022-08-29 ENCOUNTER — HOSPITAL ENCOUNTER (OUTPATIENT)
Dept: HOSPITAL 75 - ER | Age: 37
Setting detail: OBSERVATION
LOS: 1 days | Discharge: HOME | End: 2022-08-30
Attending: FAMILY MEDICINE | Admitting: FAMILY MEDICINE
Payer: COMMERCIAL

## 2022-08-29 VITALS — WEIGHT: 258.38 LBS | HEIGHT: 65 IN | BODY MASS INDEX: 43.05 KG/M2

## 2022-08-29 DIAGNOSIS — Z79.899: ICD-10-CM

## 2022-08-29 DIAGNOSIS — I50.23: Primary | ICD-10-CM

## 2022-08-29 DIAGNOSIS — F17.210: ICD-10-CM

## 2022-08-29 DIAGNOSIS — I08.1: ICD-10-CM

## 2022-08-29 LAB
ALBUMIN SERPL-MCNC: 3.8 GM/DL (ref 3.2–4.5)
ALP SERPL-CCNC: 85 U/L (ref 40–136)
ALT SERPL-CCNC: 42 U/L (ref 0–55)
APTT BLD: 30 SEC (ref 24–35)
APTT PPP: (no result) S
BACTERIA #/AREA URNS HPF: (no result) /HPF
BARBITURATES UR QL: NEGATIVE
BASOPHILS # BLD AUTO: 0.1 10^3/UL (ref 0–0.1)
BASOPHILS NFR BLD AUTO: 1 % (ref 0–10)
BENZODIAZ UR QL SCN: NEGATIVE
BILIRUB SERPL-MCNC: 1.1 MG/DL (ref 0.1–1)
BILIRUB UR QL STRIP: NEGATIVE
BUN/CREAT SERPL: 12
CALCIUM SERPL-MCNC: 9.5 MG/DL (ref 8.5–10.1)
CHLORIDE SERPL-SCNC: 103 MMOL/L (ref 98–107)
CO2 SERPL-SCNC: 25 MMOL/L (ref 21–32)
COCAINE UR QL: NEGATIVE
CREAT SERPL-MCNC: 1.14 MG/DL (ref 0.6–1.3)
D DIMER PPP FEU-MCNC: 2.94 UG/ML (ref 0–0.49)
EOSINOPHIL # BLD AUTO: 0.3 10^3/UL (ref 0–0.3)
EOSINOPHIL NFR BLD AUTO: 3 % (ref 0–10)
FIBRINOGEN PPP-MCNC: CLEAR MG/DL
GFR SERPLBLD BASED ON 1.73 SQ M-ARVRAT: 63 ML/MIN
GLUCOSE SERPL-MCNC: 105 MG/DL (ref 70–105)
GLUCOSE UR STRIP-MCNC: NEGATIVE MG/DL
HCT VFR BLD CALC: 40 % (ref 35–52)
HGB BLD-MCNC: 13.3 G/DL (ref 11.5–16)
INR PPP: 1 (ref 0.8–1.4)
KETONES UR QL STRIP: NEGATIVE
LEUKOCYTE ESTERASE UR QL STRIP: (no result)
LYMPHOCYTES # BLD AUTO: 3.8 10^3/UL (ref 1–4)
LYMPHOCYTES NFR BLD AUTO: 28 % (ref 12–44)
MAGNESIUM SERPL-MCNC: 1.8 MG/DL (ref 1.6–2.4)
MANUAL DIFFERENTIAL PERFORMED BLD QL: NO
MCH RBC QN AUTO: 28 PG (ref 25–34)
MCHC RBC AUTO-ENTMCNC: 33 G/DL (ref 32–36)
MCV RBC AUTO: 85 FL (ref 80–99)
METHADONE UR QL SCN: NEGATIVE
MONOCYTES # BLD AUTO: 1 10^3/UL (ref 0–1)
MONOCYTES NFR BLD AUTO: 7 % (ref 0–12)
NEUTROPHILS # BLD AUTO: 8.5 10^3/UL (ref 1.8–7.8)
NEUTROPHILS NFR BLD AUTO: 62 % (ref 42–75)
NITRITE UR QL STRIP: NEGATIVE
OPIATES UR QL SCN: POSITIVE
OXYCODONE UR QL: NEGATIVE
PH UR STRIP: 6 [PH] (ref 5–9)
PLATELET # BLD: 291 10^3/UL (ref 130–400)
PMV BLD AUTO: 13 FL (ref 9–12.2)
POTASSIUM SERPL-SCNC: 3.5 MMOL/L (ref 3.6–5)
PROPOXYPH UR QL: NEGATIVE
PROT SERPL-MCNC: 7.6 GM/DL (ref 6.4–8.2)
PROT UR QL STRIP: (no result)
PROTHROMBIN TIME: 13.9 SEC (ref 12.2–14.7)
RBC #/AREA URNS HPF: (no result) /HPF
RENAL EPI CELLS #/AREA URNS HPF: (no result) /HPF
SODIUM SERPL-SCNC: 140 MMOL/L (ref 135–145)
SP GR UR STRIP: 1.01 (ref 1.02–1.02)
SQUAMOUS #/AREA URNS HPF: (no result) /HPF
TRICYCLICS UR QL SCN: NEGATIVE
WBC # BLD AUTO: 13.8 10^3/UL (ref 4.3–11)

## 2022-08-29 PROCEDURE — 93041 RHYTHM ECG TRACING: CPT

## 2022-08-29 PROCEDURE — 85730 THROMBOPLASTIN TIME PARTIAL: CPT

## 2022-08-29 PROCEDURE — 85610 PROTHROMBIN TIME: CPT

## 2022-08-29 PROCEDURE — 85379 FIBRIN DEGRADATION QUANT: CPT

## 2022-08-29 PROCEDURE — 36415 COLL VENOUS BLD VENIPUNCTURE: CPT

## 2022-08-29 PROCEDURE — 87077 CULTURE AEROBIC IDENTIFY: CPT

## 2022-08-29 PROCEDURE — 83735 ASSAY OF MAGNESIUM: CPT

## 2022-08-29 PROCEDURE — 99284 EMERGENCY DEPT VISIT MOD MDM: CPT

## 2022-08-29 PROCEDURE — 87088 URINE BACTERIA CULTURE: CPT

## 2022-08-29 PROCEDURE — 85025 COMPLETE CBC W/AUTO DIFF WBC: CPT

## 2022-08-29 PROCEDURE — 84484 ASSAY OF TROPONIN QUANT: CPT

## 2022-08-29 PROCEDURE — 83880 ASSAY OF NATRIURETIC PEPTIDE: CPT

## 2022-08-29 PROCEDURE — 93306 TTE W/DOPPLER COMPLETE: CPT

## 2022-08-29 PROCEDURE — 80306 DRUG TEST PRSMV INSTRMNT: CPT

## 2022-08-29 PROCEDURE — 96372 THER/PROPH/DIAG INJ SC/IM: CPT

## 2022-08-29 PROCEDURE — 71045 X-RAY EXAM CHEST 1 VIEW: CPT

## 2022-08-29 PROCEDURE — 87186 SC STD MICRODIL/AGAR DIL: CPT

## 2022-08-29 PROCEDURE — 81000 URINALYSIS NONAUTO W/SCOPE: CPT

## 2022-08-29 PROCEDURE — 93005 ELECTROCARDIOGRAM TRACING: CPT

## 2022-08-29 PROCEDURE — 80053 COMPREHEN METABOLIC PANEL: CPT

## 2022-08-29 PROCEDURE — 96376 TX/PRO/DX INJ SAME DRUG ADON: CPT

## 2022-08-29 NOTE — DIAGNOSTIC IMAGING REPORT
EXAMINATION: Chest radiograph, portable AP view.



DATE: 8/29/2022 8:33 PM



INDICATION: 37-year-old female, chest pain.



COMPARISON:  August 28, 2022.



FINDINGS: Heart size and mediastinal contours are unchanged.

There is no identified pneumothorax. There is no large pleural

effusion. There is no identified interval focal airspace

consolidation.



IMPRESSION: 

1. No identified acute cardiopulmonary abnormality.



Dictated by: 



  Dictated on workstation # [id number]

## 2022-08-29 NOTE — ED CHEST PAIN
General


Stated Complaint:  CP





History of Present Illness


Date Seen by Provider:  Aug 29, 2022


Time Seen by Provider:  20:18


Initial Comments


29-year-old female with PMH of CHF/anxiety/methamphetamine usage, is here with 

complaints of chest pain and anxiety which began yesterday.  Patient was in the 

ER yesterday and had a full work-up and was discharged home.  Today patient 

denies fever, shortness of breath, abdominal pain, nausea vomiting, headache, 

diarrhea or constipation.  Patient states that chest pain is constant associated

with some acid reflux and also severe anxiety.  Patient was in remission for 

methamphetamine until 2 weeks ago when she took it once and then again she took 

methamphetamine the past day or 2.  Patient is due for an echo and a cardiology 

visit but she has not followed up with her cardiologist.





Allergies and Home Medications


Allergies


Coded Allergies:  


     paroxetine (Unverified  Allergy, Unknown, 14)


Uncoded Allergies:  


     ANESTHETIC (Allergy, Unknown, 14)





Patient Home Medication List


Home Medication List Reviewed:  Yes


Albuterol Sulfate (Proair Hfa) 1 Puff Puff, 2 PUFF IH Q4H


   Prescribed by: ARLET HIDALGO on 21 0545


Amoxicillin (Amoxicillin) 500 Mg Capsule, 500 MG PO TID


   Prescribed by: SABINO ALVAREZ on 20


Cefdinir (Cefdinir) 300 Mg Capsule, 300 MG PO BID


   Prescribed by: TIFFANY GILBERT on 1/10/19 1654


Dexamethasone (Decadron) 6 Mg Tablet, 6 MG PO DAILY


   Prescribed by: ARLET HIDALGO on 21 0545


Furosemide (Furosemide) 20 Mg Tablet, 10 MG PO DAILY


   Prescribed by: BLANE AGUIRRE on 22 1632


Metoprolol Succinate (Metoprolol Succinate) 25 Mg Tab.er.24h, 25 MG PO DAILY


   Prescribed by: TIFFANY GILBERT on 1/10/19 1654


Penicillin V Potassium (Penicillin V Potassium) 500 Mg Tablet, 500 MG PO QID


   Prescribed by: FELICITY TERESA on 20 1412


Potassium Chloride (Potassium Chloride) 10 Meq Tab.er.prt, 10 MEQ PO DAILY


   Prescribed by: BLANE AGUIRRE on 22 1632


Sacubitril/Valsartan (Entresto 24 mg-26 mg Tablet) 1 Each Tablet, 1 TAB PO BID


   Prescribed by: TIFFANY GILBERT on 1/10/19 1654


Tramadol HCl (Tramadol HCl) 50 Mg Tablet, 50 MG PO Q6H PRN for PAIN


   Prescribed by: FELICITY TERESA on 20 1412





Review of Systems


Review of Systems


Constitutional:  no symptoms reported


EENTM:  No Symptoms Reported


Respiratory:  No Symptoms Reported


Cardiovascular:  Chest Pain


Gastrointestinal:  No Symptoms Reported


Genitourinary:  No Symptoms Reported


Musculoskeletal:  no symptoms reported


Skin:  no symptoms reported


Psychiatric/Neurological:  Anxiety


Endocrine:  No Symptoms Reported


Hematologic/Lymphatic:  No Symptoms Reported





Past Medical-Social-Family Hx


Immunizations Up To Date


PED Vaccines UTD:  Yes





Seasonal Allergies


Seasonal Allergies:  No





Past Medical History


Surgeries:  Yes


Adenoidectomy, Tonsillectomy, Tubal Ligation


Respiratory:  Yes (VERY DIFFICULT TO INTUBATE)


Asthma


Cardiac:  No


Neurological:  No


Reproductive Disorders:  Yes


GYN History:  Tubal Ligation


Genitourinary:  No


Gastrointestinal:  No


Musculoskeletal:  No


Endocrine:  No


HEENT:  No


Cancer:  No


Psychosocial:  No


Integumentary:  No


Blood Disorders:  No





Family Medical History





Hypertension


  19 FATHER


  19 MOTHER


Myocardial infarction


  Maternal Grandmother


  Maternal Grandfather


Heart Disease, Other Conditions/Hx





Physical Exam


Vital Signs





Vital Signs - First Documented








 22





 20:09


 


Temp 36.7


 


Pulse 85


 


Resp 20


 


B/P (MAP) 107/86 (93)


 


Pulse Ox 100


 


O2 Delivery Room Air





Capillary Refill :


Height, Weight, BMI


Height: 5'5.00"


Weight: 251lbs. 6.0oz. [id number]; 43.00 BMI


Method:Estimated


General Appearance:  Anxious, Mild Distress


HEENT:  PERRL/EOMI


Neck:  Full Range of Motion


Respiratory:  Chest Non Tender, Lungs Clear, Normal Breath Sounds, No Accessory 

Muscle Use, No Respiratory Distress


Cardiovascular:  Regular Rate, Rhythm, Other (mild pitting edema)


Gastrointestinal:  Normal Bowel Sounds, Non Tender, Soft


Extremity:  Normal Range of Motion


Neurologic/Psychiatric:  Alert, Oriented x3, No Motor/Sensory Deficits, Normal 

Mood/Affect


Skin:  Normal Color


Lymphatic:  No Adenopathy





Progress/Results/Core Measures


Results/Orders


Lab Results





Laboratory Tests








Test


 22


20:15 22


21:00 Range/Units


 


 


White Blood Count


 13.8 H


 


 4.3-11.0


10^3/uL


 


Red Blood Count


 4.71 


 


 3.80-5.11


10^6/uL


 


Hemoglobin 13.3   11.5-16.0  g/dL


 


Hematocrit 40   35-52  %


 


Mean Corpuscular Volume 85   80-99  fL


 


Mean Corpuscular Hemoglobin 28   25-34  pg


 


Mean Corpuscular Hemoglobin


Concent 33 


 


 32-36  g/dL





 


Red Cell Distribution Width 15.8 H  10.0-14.5  %


 


Platelet Count


 291 


 


 130-400


10^3/uL


 


Mean Platelet Volume 13.0 H  9.0-12.2  fL


 


Immature Granulocyte % (Auto) 1    %


 


Neutrophils (%) (Auto) 62   42-75  %


 


Lymphocytes (%) (Auto) 28   12-44  %


 


Monocytes (%) (Auto) 7   0-12  %


 


Eosinophils (%) (Auto) 3   0-10  %


 


Basophils (%) (Auto) 1   0-10  %


 


Neutrophils # (Auto)


 8.5 H


 


 1.8-7.8


10^3/uL


 


Lymphocytes # (Auto)


 3.8 


 


 1.0-4.0


10^3/uL


 


Monocytes # (Auto)


 1.0 


 


 0.0-1.0


10^3/uL


 


Eosinophils # (Auto)


 0.3 


 


 0.0-0.3


10^3/uL


 


Basophils # (Auto)


 0.1 


 


 0.0-0.1


10^3/uL


 


Immature Granulocyte # (Auto)


 0.1 


 


 0.0-0.1


10^3/uL


 


Percent Immature Platelet


Fraction 14.9 H


 


 0.0-7.6  %





 


Prothrombin Time 13.9   12.2-14.7  SEC


 


INR Comment 1.0   0.8-1.4  


 


Activated Partial


Thromboplast Time 30 


 


 24-35  SEC





 


D-Dimer


 2.94 H


 


 0.00-0.49


UG/ML


 


Sodium Level 140   135-145  MMOL/L


 


Potassium Level 3.5 L  3.6-5.0  MMOL/L


 


Chloride Level 103     MMOL/L


 


Carbon Dioxide Level 25   21-32  MMOL/L


 


Anion Gap 12   5-14  MMOL/L


 


Blood Urea Nitrogen 14   7-18  MG/DL


 


Creatinine


 1.14 


 


 0.60-1.30


MG/DL


 


Estimat Glomerular Filtration


Rate 63 


 


  





 


BUN/Creatinine Ratio 12    


 


Glucose Level 105     MG/DL


 


Calcium Level 9.5   8.5-10.1  MG/DL


 


Corrected Calcium 9.7   8.5-10.1  MG/DL


 


Magnesium Level 1.8   1.6-2.4  MG/DL


 


Total Bilirubin 1.1 H  0.1-1.0  MG/DL


 


Aspartate Amino Transf


(AST/SGOT) 35 H


 


 5-34  U/L





 


Alanine Aminotransferase


(ALT/SGPT) 42 


 


 0-55  U/L





 


Alkaline Phosphatase 85     U/L


 


Troponin I < 0.028   <0.028  NG/ML


 


B-Type Natriuretic Peptide 2576.7 H  <100.0  PG/ML


 


Total Protein 7.6   6.4-8.2  GM/DL


 


Albumin 3.8   3.2-4.5  GM/DL


 


Urine Color  ORANGE   


 


Urine Clarity  CLEAR   


 


Urine pH  6.0  5-9  


 


Urine Specific Gravity  1.010 L 1.016-1.022  


 


Urine Protein  1+ H NEGATIVE  


 


Urine Glucose (UA)  NEGATIVE  NEGATIVE  


 


Urine Ketones  NEGATIVE  NEGATIVE  


 


Urine Nitrite  NEGATIVE  NEGATIVE  


 


Urine Bilirubin  NEGATIVE  NEGATIVE  


 


Urine Urobilinogen  1.0  < = 1.0  MG/DL


 


Urine Leukocyte Esterase  2+ H NEGATIVE  


 


Urine RBC (Auto)  3+ H NEGATIVE  


 


Urine RBC  NONE   /HPF


 


Urine WBC  10-25 H  /HPF


 


Urine Squamous Epithelial


Cells 


 2-5 


  /HPF





 


Urine Renal Epithelial Cells  NONE   /HPF


 


Urine Crystals  NONE   /LPF


 


Urine Bacteria  LARGE H  /HPF


 


Urine Casts  NONE   /LPF


 


Urine Mucus  LARGE H  /LPF


 


Urine Culture Indicated  YES   


 


Urine Opiates Screen  POSITIVE H NEGATIVE  


 


Urine Oxycodone Screen  NEGATIVE  NEGATIVE  


 


Urine Methadone Screen  NEGATIVE  NEGATIVE  


 


Urine Propoxyphene Screen  NEGATIVE  NEGATIVE  


 


Urine Barbiturates Screen  NEGATIVE  NEGATIVE  


 


Ur Tricyclic Antidepressants


Screen 


 NEGATIVE 


 NEGATIVE  





 


Urine Phencyclidine Screen  NEGATIVE  NEGATIVE  


 


Urine Amphetamines Screen  POSITIVE H NEGATIVE  


 


Urine Methamphetamines Screen  POSITIVE H NEGATIVE  


 


Urine Benzodiazepines Screen  NEGATIVE  NEGATIVE  


 


Urine Cocaine Screen  NEGATIVE  NEGATIVE  


 


Urine Cannabinoids Screen  NEGATIVE  NEGATIVE  








My Orders





Orders - SYLVIA HAZEL MD


Ekg Tracing (22 20:13)


Cbc With Automated Diff (22 20:19)


Magnesium (22 20:19)


Chest 1 View, Ap/Pa Only (22 20:19)


Ekg Tracing (22 20:19)


Comprehensive Metabolic Panel (22 20:19)


Protime With Inr (22 20:19)


Partial Thromboplastin Time (22 20:19)


Monitor-Rhythm Ecg Trace Only (22 20:19)


Ed Iv/Invasive Line Start (22 20:19)


Bnp [place] (22 20:19)


Troponin I [place] (22 20:19)


Aspirin Chewable Tablet (Baby Aspirin Ch (22 20:30)


Drug Screen Stat (Urine) (22 20:19)


Ua Culture If Indicated (22 20:19)


Pantoprazole Injection (Protonix Injecti (22 20:30)


Antacid  Suspension (Mylanta  Suspension (22 20:30)


Fibrin Degradation Products (22 20:15)


Urine Culture (22 21:00)


Furosemide  Injection (Lasix  Injection) (22 21:30)


Ed Admission (Communication) (22 22:50)





Medications Given in ED





Current Medications








 Medications  Dose


 Ordered  Sig/Ernie


 Route  Start Time


 Stop Time Status Last Admin


Dose Admin


 


 Al Hydrox/Mg


 Hydrox/Simethicone  30 ml  ONCE  ONCE


 PO  22 20:30


 22 20:31 DC 22 20:43


30 ML


 


 Aspirin  324 mg  ONCE  ONCE


 PO  22 20:30


 22 20:31 DC 22 20:43


324 MG


 


 Furosemide  20 mg  ONCE  ONCE


 IVP  22 21:30


 22 21:31 DC 22 21:54


20 MG


 


 Pantoprazole  40 mg  ONCE  ONCE


 IV  22 20:30


 22 20:31 DC 22 20:43


40 MG








Vital Signs/I&O











 22





 20:09


 


Temp 36.7


 


Pulse 85


 


Resp 20


 


B/P (MAP) 107/86 (93)


 


Pulse Ox 100


 


O2 Delivery Room Air











Progress


Progress Note :  


Progress Note


1. ACUTE CHF EXACERBATION:


- CXR: unchanged


- BNP: 2,576


- Lasix 20mg iv x2 in the ER


- Will benefit from admission to step down unit and cardiology consult in the 

morning, discussed with Dr Estrella


2. METHAMPHETAMINE ABUSE:


- Nearing time of admission, pt started developing PVC's on monitor. will need 

troponin trending; likely due to methamphetamine usage


- holding IVF due to CHF status


3. ACUTE CYSTITIS WITH HEMATURIA:


- UA is positive for leukocyte esterase, bacteria, RBC, WBC


-WBC elevated to 13.8 with a left shift


-Ceftriaxone IV stat in ER


4. ELEVATED D-DIMER:


- D-dimer is 2.94


- CTA CHEST done yesterday is negative for PE


EKG :  


   EKG Time:  20:15


   Rate:  81


   Rhythm:  PVC


   Intervals:  Normal


   ECG Impression:  Nonspecific Changes


Comment


PVC"s





Diagnostic Imaging





   Diagonstic Imaging:  CT


   Plain Films/CT/US/NM/MRI:  chest


Comments


[facility] [place], [place]





NAME:   HERNAN REESE [facility] REC#:   [id number]


ACCOUNT#:   [id number]


PT STATUS:   REG ER


:   1985


PHYSICIAN:   SYLVIA HAZEL MD


ADMIT DATE:   22/ER


                                   ***Draft***


Date of Exam:22





CHEST 1 VIEW, AP/PA ONLY








EXAMINATION: Chest radiograph, portable AP view.





DATE: 2022 8:33 PM





INDICATION: 37-year-old female, chest pain.





COMPARISON:  2022.





FINDINGS: Heart size and mediastinal contours are unchanged.


There is no identified pneumothorax. There is no large pleural


effusion. There is no identified interval focal airspace


consolidation.





IMPRESSION: 


1. No identified acute cardiopulmonary abnormality.








  Dictated on workstation # [id number]








Dict:   22


Trans:   22


[facility] [id number]





Interpreted by:     JACQUE TREVINO MD


Electronically signed by:  














From Yesterday ER visit:


                 [facility] [place], [place]





NAME:   HERNAN REESE [facility] REC#:   [id number]


ACCOUNT#:   [id number]


PT STATUS:   REG ER


:   1985


PHYSICIAN:   BLANE AGUIRRE


ADMIT DATE:   22/ER


                                  ***Signed***


Date of Exam:22





CT ANGIO CHEST W








INDICATION: Elevated d-dimer. Shortness of breath.





EXAMINATION: CT angiogram of the chest, 2022.





COMPARISON: 2019.





FINDINGS: There are no central or proximal segmental pulmonary


emboli. Minimally prominent lymph nodes within the AP window


noted with mild adenopathy noted in the right hilum as well.


There are small bilateral pleural effusions. There is no


significant pericardial effusion. Within the lungs respiratory


motion limits evaluation. Vague patchy airspace opacities noted


within the posterior aspect of the right lower lobe and similar


findings noted in the left lower lobe. These findings could be


due to atelectasis with early infiltrates not excluded. There is


no pneumothorax.





The visualized upper abdominal structures demonstrate findings of


suggested right heart failure or strain with reflux of contrast


into the hepatic vessels and IVC. There is incompletely imaged


cholelithiasis. There is no acute osseous abnormality.





IMPRESSION:


1. No central or proximal segmental pulmonary embolus.


2. Scattered areas of atelectasis versus early infiltrate in the


lower lobes with small bilateral pleural effusions. 


3. Nonspecific minimally prominent lymph nodes in the right hilum


and AP window. These could be followed to assure resolution. 


4. Findings suggestive of right heart strain.


5. Cholelithiasis. 





Dictated by: 





  Dictated on workstation # [id number]








Dict:   22 1532


Trans:   22 1611


[facility] [id number]





Interpreted by:     KEIRA BRIONES MD


Electronically signed by: KEIRA BRIONES MD 22 1611





Departure


Communication (Admissions)


Time/Spoke to Admitting Phy:  21:58


Discussed with Dr. Estrella, and will admit patient





Impression





   Primary Impression:  


   Acute exacerbation of CHF (congestive heart failure)


   Qualified Codes:  I50.9 - Heart failure, unspecified


   Additional Impressions:  


   Methamphetamine abuse


   Acute cystitis with hematuria


Disposition:  30 STILL A PATIENT


Condition:  Stable





Admissions


Decision to Admit Reason:  Admit from ER (General)


Decision to Admit/Date:  Aug 29, 2022


Time/Decision to Admit Time:  21:58





Departure-Patient Inst.


Referrals:  


[facility] (PCP/Family)


Primary Care Physician











SYLVIA HAZEL MD              Aug 29, 2022 20:18

## 2022-08-30 VITALS — DIASTOLIC BLOOD PRESSURE: 90 MMHG | SYSTOLIC BLOOD PRESSURE: 117 MMHG

## 2022-08-30 VITALS — SYSTOLIC BLOOD PRESSURE: 139 MMHG | DIASTOLIC BLOOD PRESSURE: 108 MMHG

## 2022-08-30 VITALS — SYSTOLIC BLOOD PRESSURE: 121 MMHG | DIASTOLIC BLOOD PRESSURE: 98 MMHG

## 2022-08-30 VITALS — DIASTOLIC BLOOD PRESSURE: 86 MMHG | SYSTOLIC BLOOD PRESSURE: 107 MMHG

## 2022-08-30 VITALS — SYSTOLIC BLOOD PRESSURE: 140 MMHG | DIASTOLIC BLOOD PRESSURE: 112 MMHG

## 2022-08-30 VITALS — DIASTOLIC BLOOD PRESSURE: 108 MMHG | SYSTOLIC BLOOD PRESSURE: 137 MMHG

## 2022-08-30 VITALS — SYSTOLIC BLOOD PRESSURE: 128 MMHG | DIASTOLIC BLOOD PRESSURE: 101 MMHG

## 2022-08-30 VITALS — SYSTOLIC BLOOD PRESSURE: 123 MMHG | DIASTOLIC BLOOD PRESSURE: 96 MMHG

## 2022-08-30 VITALS — SYSTOLIC BLOOD PRESSURE: 98 MMHG | DIASTOLIC BLOOD PRESSURE: 72 MMHG

## 2022-08-30 VITALS — SYSTOLIC BLOOD PRESSURE: 126 MMHG | DIASTOLIC BLOOD PRESSURE: 92 MMHG

## 2022-08-30 VITALS — SYSTOLIC BLOOD PRESSURE: 129 MMHG | DIASTOLIC BLOOD PRESSURE: 99 MMHG

## 2022-08-30 LAB
ALBUMIN SERPL-MCNC: 3.6 GM/DL (ref 3.2–4.5)
ALP SERPL-CCNC: 75 U/L (ref 40–136)
ALT SERPL-CCNC: 41 U/L (ref 0–55)
BASOPHILS # BLD AUTO: 0.1 10^3/UL (ref 0–0.1)
BASOPHILS NFR BLD AUTO: 0 % (ref 0–10)
BILIRUB SERPL-MCNC: 1 MG/DL (ref 0.1–1)
BUN/CREAT SERPL: 14
CALCIUM SERPL-MCNC: 8.9 MG/DL (ref 8.5–10.1)
CHLORIDE SERPL-SCNC: 103 MMOL/L (ref 98–107)
CO2 SERPL-SCNC: 24 MMOL/L (ref 21–32)
CREAT SERPL-MCNC: 1.11 MG/DL (ref 0.6–1.3)
EOSINOPHIL # BLD AUTO: 0.3 10^3/UL (ref 0–0.3)
EOSINOPHIL NFR BLD AUTO: 3 % (ref 0–10)
GFR SERPLBLD BASED ON 1.73 SQ M-ARVRAT: 65 ML/MIN
GLUCOSE SERPL-MCNC: 98 MG/DL (ref 70–105)
HCT VFR BLD CALC: 39 % (ref 35–52)
HGB BLD-MCNC: 12.9 G/DL (ref 11.5–16)
LYMPHOCYTES # BLD AUTO: 2.9 10^3/UL (ref 1–4)
LYMPHOCYTES NFR BLD AUTO: 25 % (ref 12–44)
MANUAL DIFFERENTIAL PERFORMED BLD QL: NO
MCH RBC QN AUTO: 28 PG (ref 25–34)
MCHC RBC AUTO-ENTMCNC: 33 G/DL (ref 32–36)
MCV RBC AUTO: 85 FL (ref 80–99)
MONOCYTES # BLD AUTO: 0.8 10^3/UL (ref 0–1)
MONOCYTES NFR BLD AUTO: 7 % (ref 0–12)
NEUTROPHILS # BLD AUTO: 7.2 10^3/UL (ref 1.8–7.8)
NEUTROPHILS NFR BLD AUTO: 64 % (ref 42–75)
PLATELET # BLD: 259 10^3/UL (ref 130–400)
PMV BLD AUTO: 13.6 FL (ref 9–12.2)
POTASSIUM SERPL-SCNC: 3.4 MMOL/L (ref 3.6–5)
PROT SERPL-MCNC: 7.2 GM/DL (ref 6.4–8.2)
SODIUM SERPL-SCNC: 141 MMOL/L (ref 135–145)
WBC # BLD AUTO: 11.4 10^3/UL (ref 4.3–11)

## 2022-08-30 RX ADMIN — ENOXAPARIN SODIUM SCH MG: 100 INJECTION SUBCUTANEOUS at 10:16

## 2022-08-30 RX ADMIN — ENOXAPARIN SODIUM SCH MG: 100 INJECTION SUBCUTANEOUS at 01:43

## 2022-08-30 NOTE — CONSULTATION-CARDIOLOGY
HPI-Cardiology


Cardiology Consultation


Date of Consultation


8/30/22


Date of Admission





Time Seen by Provider:  07:34


Indication:  Congestive heart failure





HPI


37-year-old lady with history of nonischemic cardiomyopathy.  Has been compliant

with her medication, has used methamphetamine recently.  Came into the emergency

room with chest pain, shortness of breath.


Responded to Lasix.  She denied any fever or chills.  No cough or sputum.  

Denied any palpitation or syncope.





Home Medications & Allergies


Allergies:  


Coded Allergies:  


     paroxetine (Unverified  Allergy, Unknown, 5/5/14)


Uncoded Allergies:  


     ANESTHETIC (Allergy, Unknown, 5/5/14)


Home Medication List Reviewed:  Yes





PMH-Social-Family Hx


Patient Social History


Marital Status:  single


Drug of Choice:  POT


Smoking Status:  Current Everyday Smoker


Type Used:  Cigarettes


2nd Hand Smoke Exposure:  Yes


Recent Hopitalizations:  No


Have you traveled recently?:  No


Alcohol Use?:  No


Substance type:  Amphetamines, Methamphetamine, Opiates/Opioids





Immunizations Up To Date


Date of Pneumonia Vaccine:  Oct 1, 2011


Date of Influenza Vaccine:  Jan 9, 2019





Past Medical History


Discussed below





Family Medical History


Significant Family History:  Heart Disease, Other Conditions/Hx


Family History:  


Hypertension


  19 FATHER


  19 MOTHER


Myocardial infarction


  Maternal Grandmother


  Maternal Grandfather





Review of Systems-General


Review of Systems


Constitutional:  no symptoms reported, malaise, weakness


EENTM:  see HPI, no symptoms reported


Respiratory:  see HPI; No cough; dyspnea on exertion; No hemoptysis, No 

orthopnea, No phlegm; short of breath; No stridor, No wheezing, No other


Cardiovascular:  see HPI, chest pain, edema; No Hx of Intervention, No 

palpitations, No syncope, No vascular heart diseas, No other


Gastrointestinal:  no symptoms reported, see HPI


Genitourinary:  no symptoms reported, see HPI


Musculoskeletal:  no symptoms reported


Skin:  no symptoms reported


Psychiatric/Neurological:  Anxiety





Reviewed Test Results


Reviewed Test Results


Lab





Laboratory Tests








Test


 8/29/22


20:15 8/29/22


21:00 8/29/22


23:46 Range/Units


 


 


White Blood Count


 13.8 H


 


 11.4 H


 4.3-11.0


10^3/uL


 


Red Blood Count


 4.71 


 


 4.59 


 3.80-5.11


10^6/uL


 


Hemoglobin 13.3   12.9  11.5-16.0  g/dL


 


Hematocrit 40   39  35-52  %


 


Mean Corpuscular Volume 85   85  80-99  fL


 


Mean Corpuscular Hemoglobin 28   28  25-34  pg


 


Mean Corpuscular Hemoglobin


Concent 33 


 


 33 


 32-36  g/dL





 


Red Cell Distribution Width 15.8 H  15.8 H 10.0-14.5  %


 


Platelet Count


 291 


 


 259 


 130-400


10^3/uL


 


Mean Platelet Volume 13.0 H  13.6 H 9.0-12.2  fL


 


Immature Granulocyte % (Auto) 1   1   %


 


Neutrophils (%) (Auto) 62   64  42-75  %


 


Lymphocytes (%) (Auto) 28   25  12-44  %


 


Monocytes (%) (Auto) 7   7  0-12  %


 


Eosinophils (%) (Auto) 3   3  0-10  %


 


Basophils (%) (Auto) 1   0  0-10  %


 


Neutrophils # (Auto)


 8.5 H


 


 7.2 


 1.8-7.8


10^3/uL


 


Lymphocytes # (Auto)


 3.8 


 


 2.9 


 1.0-4.0


10^3/uL


 


Monocytes # (Auto)


 1.0 


 


 0.8 


 0.0-1.0


10^3/uL


 


Eosinophils # (Auto)


 0.3 


 


 0.3 


 0.0-0.3


10^3/uL


 


Basophils # (Auto)


 0.1 


 


 0.1 


 0.0-0.1


10^3/uL


 


Immature Granulocyte # (Auto)


 0.1 


 


 0.1 


 0.0-0.1


10^3/uL


 


Percent Immature Platelet


Fraction 14.9 H


 


 15.5 H


 0.0-7.6  %





 


Prothrombin Time 13.9    12.2-14.7  SEC


 


INR Comment 1.0    0.8-1.4  


 


Activated Partial


Thromboplast Time 30 


 


 


 24-35  SEC





 


D-Dimer


 2.94 H


 


 


 0.00-0.49


UG/ML


 


Sodium Level 140   141  135-145  MMOL/L


 


Potassium Level 3.5 L  3.4 L 3.6-5.0  MMOL/L


 


Chloride Level 103   103    MMOL/L


 


Carbon Dioxide Level 25   24  21-32  MMOL/L


 


Anion Gap 12   14  5-14  MMOL/L


 


Blood Urea Nitrogen 14   15  7-18  MG/DL


 


Creatinine


 1.14 


 


 1.11 


 0.60-1.30


MG/DL


 


Estimat Glomerular Filtration


Rate 63 


 


 65 


  





 


BUN/Creatinine Ratio 12   14   


 


Glucose Level 105   98    MG/DL


 


Calcium Level 9.5   8.9  8.5-10.1  MG/DL


 


Corrected Calcium 9.7   9.2  8.5-10.1  MG/DL


 


Magnesium Level 1.8    1.6-2.4  MG/DL


 


Total Bilirubin 1.1 H  1.0  0.1-1.0  MG/DL


 


Aspartate Amino Transf


(AST/SGOT) 35 H


 


 33 


 5-34  U/L





 


Alanine Aminotransferase


(ALT/SGPT) 42 


 


 41 


 0-55  U/L





 


Alkaline Phosphatase 85   75    U/L


 


Troponin I < 0.028   < 0.028  <0.028  NG/ML


 


B-Type Natriuretic Peptide 2576.7 H   <100.0  PG/ML


 


Total Protein 7.6   7.2  6.4-8.2  GM/DL


 


Albumin 3.8   3.6  3.2-4.5  GM/DL


 


Urine Color  ORANGE    


 


Urine Clarity  CLEAR    


 


Urine pH  6.0   5-9  


 


Urine Specific Gravity  1.010 L  1.016-1.022  


 


Urine Protein  1+ H  NEGATIVE  


 


Urine Glucose (UA)  NEGATIVE   NEGATIVE  


 


Urine Ketones  NEGATIVE   NEGATIVE  


 


Urine Nitrite  NEGATIVE   NEGATIVE  


 


Urine Bilirubin  NEGATIVE   NEGATIVE  


 


Urine Urobilinogen  1.0   < = 1.0  MG/DL


 


Urine Leukocyte Esterase  2+ H  NEGATIVE  


 


Urine RBC (Auto)  3+ H  NEGATIVE  


 


Urine RBC  NONE    /HPF


 


Urine WBC  10-25 H   /HPF


 


Urine Squamous Epithelial


Cells 


 2-5 


 


  /HPF





 


Urine Renal Epithelial Cells  NONE    /HPF


 


Urine Crystals  NONE    /LPF


 


Urine Bacteria  LARGE H   /HPF


 


Urine Casts  NONE    /LPF


 


Urine Mucus  LARGE H   /LPF


 


Urine Culture Indicated  YES    


 


Urine Opiates Screen  POSITIVE H  NEGATIVE  


 


Urine Oxycodone Screen  NEGATIVE   NEGATIVE  


 


Urine Methadone Screen  NEGATIVE   NEGATIVE  


 


Urine Propoxyphene Screen  NEGATIVE   NEGATIVE  


 


Urine Barbiturates Screen  NEGATIVE   NEGATIVE  


 


Ur Tricyclic Antidepressants


Screen 


 NEGATIVE 


 


 NEGATIVE  





 


Urine Phencyclidine Screen  NEGATIVE   NEGATIVE  


 


Urine Amphetamines Screen  POSITIVE H  NEGATIVE  


 


Urine Methamphetamines Screen  POSITIVE H  NEGATIVE  


 


Urine Benzodiazepines Screen  NEGATIVE   NEGATIVE  


 


Urine Cocaine Screen  NEGATIVE   NEGATIVE  


 


Urine Cannabinoids Screen  NEGATIVE   NEGATIVE  











Physical Exam


Physical Exam


Vital Signs





Vital Signs - First Documented








 8/29/22 8/30/22 8/30/22





 20:09 01:18 01:45


 


Temp 36.7  


 


Pulse 85  


 


Resp 20  


 


B/P (MAP) 107/86 (93)  


 


Pulse Ox 100  


 


O2 Delivery Room Air  


 


O2 Flow Rate   2.00


 


FiO2  21 





Capillary Refill : Less Than 3 Seconds


Height, Weight, BMI


Height: 5'5.00"


Weight: 251lbs. 6.0oz. 113.996113fh; 42.99 BMI


Method:Estimated


General Appearance:  No Apparent Distress


Eyes:  Bilateral Eye Normal Inspection, Bilateral Eye PERRL, Bilateral Eye EOMI


HEENT:  PERRL/EOMI


Neck:  Full Range of Motion


Respiratory:  Chest Non Tender, No Accessory Muscle Use, No Respiratory 

Distress, Wheezing


Cardiovascular:  Regular Rate, Rhythm, Gallop/S3, Other (mild pitting edema)


Gastrointestinal:  Normal Bowel Sounds, Non Tender, Soft


Back:  Normal Inspection, No CVA Tenderness, No Vertebral Tenderness


Extremity:  Normal Range of Motion


Neurologic/Psychiatric:  Alert, Oriented x3, No Motor/Sensory Deficits, Normal 

Mood/Affect


Skin:  Normal Color


Lymphatic:  No Adenopathy





A/P-Cardiology


Admission Diagnosis


Chest pain


Dyspnea


Congestive heart failure, acute on chronic left ventricular systolic 

dysfunction, nonischemic cardiomyopathy


Methamphetamine use





Assessment/Plan


Chest pain nonspecific etiology, atypical in presentation, EKG and cardiac 

enzymes did not show any acute abnormality.


Probably noncardiac chest pain.





Shortness of breath, fluid overload, responding to Lasix, reporting improvement 

in her symptoms.


Continue on diuretics.





Congestive heart failure, acute on chronic left ventricular systolic d

ysfunction, nonischemic cardiomyopathy,


Last echocardiogram done on October 22, 2020 showing normal LV size with EF 55-

65 percent, grade 1 diastolic dysfunction, left atrium 4.6 cm, PA 40-45 mmHg.


Maintained on metoprolol and Entresto


Educated on compliance with medication


I am planning to repeat 2D echocardiogram





Status post COVID-19 infection in August 2021, was milder. Has recovered well





Hypertension, good control, mild orthostatic dizziness, didn't take her meds 

today, reporting good blood pressure control





Status post hypotension. Secondary to medication. Doing well at this time. 

Continue to monitor





Frequent premature ventricular contractions, tolerating beta blockers well.





Obesity, BMI 43, has been gaining weight. We discussed weight loss and diet and 

exercise





Tobaccoism, has stopped smoking since her hospitalization in January 2019, 

encouraged to continue with smoking cessation.





Sleep study was done showing primary snoring, no sleep apnea done in February 2019





Methamphetamine use, educated on avoiding illicit drug use





Clinical Quality Measures


AMI/AHF:


ASA po Prior to arrival:  JOIE Jones MD              Aug 30, 2022 07:38

## 2022-08-30 NOTE — SHORT STAY SUMMARY
HPI


History of Present Illness:


Patient is a 36 yo female admitted for chest pain and acute CHF exacerbation. 

She states that she is feeling okay this morning. She indicates that she still 

experiences intermittent chest pain. She describes the pain as stabbing and 

localizes it to the entirety of her upper chest. She also indicates SOB, 

dyspnea, and trouble urinating. She denies constipation, diarrhea, nausea or 

vomiting, as well as cough and dysuria.


Source:  patient, EMS notes reviewed


Exam Limitations:  no limitations


Date seen by provider:  Aug 30, 2022


Time Seen by Provider:  07:40


Attending Physician


[place]


PCP


Admitting Physician:


Kristen Estrella MD 








Attending Physician:


Kristen Estrella MD


Consult





Date of Admission


Aug 30, 2022 at 00:32





Home Medications


Home Medications


Reviewed patient Home Medication Reconciliation performed by pharmacy medication

reconciliations technician and/or nursing.


Patients Allergies have been reviewed.





Allergies


Coded Allergies:  


     paroxetine (Unverified  Allergy, Unknown, 5/5/14)


Uncoded Allergies:  


     ANESTHETIC (Allergy, Unknown, 5/5/14)





PMH-Social-Family Hx


Patient Social History


Marrital Status:  single


Employed/Student:  employed


Drug of Choice:  POT


Smoking Status:  Current Everyday Smoker


2nd Hand Smoke Exposure:  Yes


Recent Hopitalizations:  No


Alcohol Use?:  No


Substance type:  Amphetamines, Methamphetamine, Opiates/Opioids


Tobacco type used:  Cigarettes


Have you traveled recently?:  No





Immunizations Up To Date


Tetanus Booster (TDap):  Unknown


Influenza Vaccine Up-to-Date:  No; Not Current





Past Medical History





As indicated in problem list.





Family Medical History


Significant Family History:  Heart Disease, Other Conditions/Hx


Family History:  


Hypertension


  19 FATHER


  19 MOTHER


Myocardial infarction


  Maternal Grandmother


  Maternal Grandfather





Review of Systems ([facility])


Constitutional:  see HPI; No chills


Respiratory:  see HPI; No cough; dyspnea on exertion, short of breath


Cardiovascular:  see HPI, chest pain, edema (Bilateral pitting edema of the 

shins.)


Gastrointestinal:  no symptoms reported; No abdominal pain, No constipation, No 

diarrhea, No nausea, No vomiting


Genitourinary:  see HPI, decreased output; No dysuria


Musculoskeletal:  no symptoms reported


Skin:  no symptoms reported, other (Bilateral swelling of both shins.)


Psychiatric/Neurological:  No Symptoms Reported





Reviewed Test Results


Reviewed Test Results


Lab


As indicated in lab results.


Radiology


EXAMINATION: Chest radiograph, portable AP view.





DATE: 8/29/2022 8:33 PM





INDICATION: 37-year-old female, chest pain.





COMPARISON:  August 28, 2022.





FINDINGS: Heart size and mediastinal contours are unchanged.


There is no identified pneumothorax. There is no large pleural


effusion. There is no identified interval focal airspace


consolidation.





IMPRESSION: 


1. No identified acute cardiopulmonary abnormality.





Dictated by: 





  Dictated on workstation # [id number]








Dict:   08/29/22 2049


Trans:   08/29/22 2245


[facility] [id number]





Interpreted by:     JACQUE TREVINO MD


Electronically signed by: JACQUE TREVINO MD 08/29/22 2245





Physical Exam-([facility])


Physical Exam


Vital Signs





                          VS - Last 72 Hours, by Label








 8/29/22 8/30/22 8/30/22 8/30/22





 20:09 00:23 01:06 01:06


 


Temp 36.7   36.8


 


Pulse 85 71 80 79


 


Resp 20 20  16


 


B/P (MAP) 107/86 (93) 140/104  129/99 (109)


 


Pulse Ox 100 97  100


 


O2 Delivery Room Air Room Air  Room Air


 


    





 8/30/22 8/30/22 8/30/22 8/30/22





 01:15 01:15 01:18 01:30


 


Temp   36.7 


 


Pulse  76 85 74


 


Resp  14  12


 


B/P (MAP)  117/90 (99)  128/101 (110)


 


Pulse Ox 100 99 100 97


 


O2 Delivery Room Air Room Air  Room Air


 


FiO2   21 


 


    





 8/30/22 8/30/22 8/30/22 8/30/22





 01:45 01:45 02:00 03:00


 


Pulse  73 84 76


 


Resp  18 20 22


 


B/P (MAP)  98/72 (81) 121/98 (106) 


 


Pulse Ox  98 98 95


 


O2 Delivery Nasal Cannula Nasal Cannula Nasal Cannula Nasal Cannula


 


O2 Flow Rate 2.00 2.00 2.00 2.00





 8/30/22 8/30/22 8/30/22 8/30/22





 03:45 04:00 07:23 08:00


 


Temp 36.7   36.0


 


Pulse  73 78 


 


Resp  20  


 


B/P (MAP)  137/108 (118)  


 


Pulse Ox  94  


 


O2 Delivery Nasal Cannula Nasal Cannula  


 


O2 Flow Rate 2.00 2.00  


 


    





 8/30/22 8/30/22 8/30/22 8/30/22





 08:00 09:00 09:21 10:00


 


Pulse 73 76  77


 


Resp 23 24  25


 


B/P (MAP) 139/108 (118) 140/112 (121)  123/96 (105)


 


Pulse Ox 98 96 98 97


 


O2 Delivery Nasal Cannula Nasal Cannula Nasal Cannula Nasal Cannula


 


O2 Flow Rate 2.00 2.00 2.00 2.00





 8/30/22   





 11:00   


 


Pulse 75   


 


Resp 14   


 


B/P (MAP) 126/92 (103)   


 


Pulse Ox 97   


 


O2 Delivery Nasal Cannula   


 


O2 Flow Rate 2.00   





Capillary Refill : Less Than 3 Seconds


General Appearance:  WD/WN, no apparent distress, obese


Neck:  normal inspection


Respiratory:  chest non-tender, crackles (Bilateral crackles in upper lobes 

bilaterally)


Cardiovascular:  regular rate, rhythm, no gallop, no JVD, no murmur


Gastrointestinal:  non tender, soft, no organomegaly


Back:  normal inspection


Extremities:  swelling (Swelling of shins bilaterally)


Neurologic/Psychiatric:  alert, normal mood/affect, oriented x 3


Skin:  normal color, damp





Short Stay Diagnosis


Discharge Diagnosis-Short Stay


Admission Diagnosis


Chest pain and acute CHF exacerbation


Final Discharge Diagnosis


Chest pain (intermittent) and acute CHF exacerbation.





Conclusion


Plan


See A/P


Was the Problem List Reviewed?:  Yes





Clinical Quality Measures


AMI/AHF:


ASA po Prior to arrival:  No





DVT/VTE Risk/Contraindication:


VTE Addressed:  Yes


VTE Present on Admission:  No





Assessment/Plan


Assessment/Plan


Admission Dx


Chest Pain and acute CHF exacerbation


Admission Status:  Observation


Assessment & Plan


Acute CHF exacerbation - furosemide. Albuterol sulfate for respiratory symptoms.

Entresto and metoprolol succinate prescribed


DVT prophylaxis - enoxaparin


Pain - acetominophen











YOSI HERRMANN                Aug 30, 2022 12:14

## 2022-08-30 NOTE — DISCHARGE SUMMARY
Discharge [facility]


Reconcile Patient Problems


Problems Reviewed?:  Yes





Discharge Medications


New, Converted or Re-Newed RX:  Other (No new meds)


Continued Medications:  


Acetaminophen (Tylenol Extra Strength) 500 Mg Tablet


1000 MG PO Q8H PRN for PAIN-MILD (1-4), TAB





Metoprolol Succinate (Metoprolol Succinate) 100 Mg Tab.er.24h


100 MG PO HS, TAB


LAST FILLED 02- #90/90 DAY SUPPLT


Sacubitril/Valsartan (Entresto 24 mg-26 mg Tablet) 24 Mg-26 Mg Tablet


1 TAB PO BID, TAB





 


Discontinued Medications:  


Furosemide (Furosemide) 20 Mg Tablet


20 MG PO DAILY for 7 Days, TAB


FILLED 08- #7/7 DAY SUPPLY


Ibuprofen (Ibuprofen) 200 Mg Capsule


400 MG PO Q8H PRN for PAIN-MILD (1-4), CAP





Potassium Chloride (Potassium Chloride) 10 Meq Tab.er.prt


10 MEQ PO DAILY for 7 Days, TAB


FILLED 08- #7/7 DAY SUPPLY








Patient Instructions


Goal/Follow Up Appt:  


PCP 1-2 weeks


Return to The Hospital For:  


Returning chest pain or shortness of breath





Activity & Diet


Discharge Diet:  Cardiac Diet











URSULA REBOLLAR MD               Aug 30, 2022 12:12